# Patient Record
Sex: FEMALE | Race: WHITE | NOT HISPANIC OR LATINO | Employment: OTHER | URBAN - NONMETROPOLITAN AREA
[De-identification: names, ages, dates, MRNs, and addresses within clinical notes are randomized per-mention and may not be internally consistent; named-entity substitution may affect disease eponyms.]

---

## 2020-06-04 ENCOUNTER — PRENATAL OFFICE VISIT (OUTPATIENT)
Dept: FAMILY MEDICINE | Facility: OTHER | Age: 40
End: 2020-06-04
Payer: COMMERCIAL

## 2020-06-04 DIAGNOSIS — N91.2 ABSENCE OF MENSTRUATION: ICD-10-CM

## 2020-06-04 DIAGNOSIS — O09.519 SUPERVISION OF HIGH-RISK PREGNANCY OF ELDERLY PRIMIGRAVIDA: Primary | ICD-10-CM

## 2020-06-04 PROCEDURE — 99207 ZZC NO CHARGE NURSE ONLY: CPT

## 2020-06-04 RX ORDER — LEVOTHYROXINE SODIUM 100 UG/1
100 TABLET ORAL DAILY
COMMUNITY
End: 2020-07-31

## 2020-06-04 RX ORDER — PRENATAL VIT/IRON FUM/FOLIC AC 27MG-0.8MG
1 TABLET ORAL DAILY
COMMUNITY
End: 2022-11-20

## 2020-06-04 NOTE — PROGRESS NOTES
OB Intake phone visit with verbal patient permission.  Informed no visitors allowed during clinic visits and she expressed understanding.

## 2020-06-18 ENCOUNTER — HOSPITAL ENCOUNTER (OUTPATIENT)
Dept: ULTRASOUND IMAGING | Facility: CLINIC | Age: 40
Discharge: HOME | End: 2020-06-18
Attending: FAMILY MEDICINE | Admitting: FAMILY MEDICINE
Payer: COMMERCIAL

## 2020-06-18 ENCOUNTER — PRENATAL OFFICE VISIT (OUTPATIENT)
Dept: FAMILY MEDICINE | Facility: CLINIC | Age: 40
End: 2020-06-18
Payer: COMMERCIAL

## 2020-06-18 VITALS
HEIGHT: 66 IN | OXYGEN SATURATION: 100 % | BODY MASS INDEX: 30.53 KG/M2 | SYSTOLIC BLOOD PRESSURE: 120 MMHG | HEART RATE: 75 BPM | TEMPERATURE: 97.8 F | RESPIRATION RATE: 16 BRPM | WEIGHT: 190 LBS | DIASTOLIC BLOOD PRESSURE: 70 MMHG

## 2020-06-18 DIAGNOSIS — O09.529 SUPERVISION OF HIGH-RISK PREGNANCY OF ELDERLY MULTIGRAVIDA: Primary | ICD-10-CM

## 2020-06-18 DIAGNOSIS — N91.2 ABSENCE OF MENSTRUATION: ICD-10-CM

## 2020-06-18 DIAGNOSIS — O09.519 SUPERVISION OF HIGH-RISK PREGNANCY OF ELDERLY PRIMIGRAVIDA: ICD-10-CM

## 2020-06-18 DIAGNOSIS — B96.89 BACTERIAL VAGINOSIS IN PREGNANCY: ICD-10-CM

## 2020-06-18 DIAGNOSIS — O23.599 BACTERIAL VAGINOSIS IN PREGNANCY: ICD-10-CM

## 2020-06-18 LAB
ABO + RH BLD: NORMAL
ABO + RH BLD: NORMAL
B-HCG SERPL-ACNC: ABNORMAL IU/L (ref 0–5)
BLD GP AB SCN SERPL QL: NORMAL
BLOOD BANK CMNT PATIENT-IMP: NORMAL
ERYTHROCYTE [DISTWIDTH] IN BLOOD BY AUTOMATED COUNT: 11.5 % (ref 10–15)
HCG UR QL: POSITIVE
HCT VFR BLD AUTO: 40.3 % (ref 35–47)
HGB BLD-MCNC: 13.4 G/DL (ref 11.7–15.7)
MCH RBC QN AUTO: 31.9 PG (ref 26.5–33)
MCHC RBC AUTO-ENTMCNC: 33.3 G/DL (ref 31.5–36.5)
MCV RBC AUTO: 96 FL (ref 78–100)
PLATELET # BLD AUTO: 310 10E9/L (ref 150–450)
RBC # BLD AUTO: 4.2 10E12/L (ref 3.8–5.2)
SPECIMEN EXP DATE BLD: NORMAL
SPECIMEN SOURCE: ABNORMAL
TSH SERPL DL<=0.005 MIU/L-ACNC: 0.55 MU/L (ref 0.4–4)
WBC # BLD AUTO: 6 10E9/L (ref 4–11)
WET PREP SPEC: ABNORMAL

## 2020-06-18 PROCEDURE — 87086 URINE CULTURE/COLONY COUNT: CPT | Performed by: FAMILY MEDICINE

## 2020-06-18 PROCEDURE — 84443 ASSAY THYROID STIM HORMONE: CPT | Performed by: FAMILY MEDICINE

## 2020-06-18 PROCEDURE — 87389 HIV-1 AG W/HIV-1&-2 AB AG IA: CPT | Performed by: FAMILY MEDICINE

## 2020-06-18 PROCEDURE — 87210 SMEAR WET MOUNT SALINE/INK: CPT | Performed by: FAMILY MEDICINE

## 2020-06-18 PROCEDURE — 99207 ZZC FIRST OB VISIT: CPT | Performed by: FAMILY MEDICINE

## 2020-06-18 PROCEDURE — 76801 OB US < 14 WKS SINGLE FETUS: CPT

## 2020-06-18 PROCEDURE — 36415 COLL VENOUS BLD VENIPUNCTURE: CPT | Performed by: FAMILY MEDICINE

## 2020-06-18 PROCEDURE — 87340 HEPATITIS B SURFACE AG IA: CPT | Performed by: FAMILY MEDICINE

## 2020-06-18 PROCEDURE — 86901 BLOOD TYPING SEROLOGIC RH(D): CPT | Performed by: FAMILY MEDICINE

## 2020-06-18 PROCEDURE — 86762 RUBELLA ANTIBODY: CPT | Performed by: FAMILY MEDICINE

## 2020-06-18 PROCEDURE — 86780 TREPONEMA PALLIDUM: CPT | Performed by: FAMILY MEDICINE

## 2020-06-18 PROCEDURE — 87491 CHLMYD TRACH DNA AMP PROBE: CPT | Performed by: FAMILY MEDICINE

## 2020-06-18 PROCEDURE — 85027 COMPLETE CBC AUTOMATED: CPT | Performed by: FAMILY MEDICINE

## 2020-06-18 PROCEDURE — 86850 RBC ANTIBODY SCREEN: CPT | Performed by: FAMILY MEDICINE

## 2020-06-18 PROCEDURE — 84702 CHORIONIC GONADOTROPIN TEST: CPT | Performed by: FAMILY MEDICINE

## 2020-06-18 PROCEDURE — 86900 BLOOD TYPING SEROLOGIC ABO: CPT | Performed by: FAMILY MEDICINE

## 2020-06-18 PROCEDURE — 87591 N.GONORRHOEAE DNA AMP PROB: CPT | Performed by: FAMILY MEDICINE

## 2020-06-18 PROCEDURE — 81025 URINE PREGNANCY TEST: CPT | Performed by: FAMILY MEDICINE

## 2020-06-18 RX ORDER — METRONIDAZOLE 7.5 MG/G
1 GEL VAGINAL 2 TIMES DAILY
Qty: 50 G | Refills: 0 | Status: SHIPPED | OUTPATIENT
Start: 2020-06-18 | End: 2020-06-26

## 2020-06-18 ASSESSMENT — MIFFLIN-ST. JEOR: SCORE: 1548.33

## 2020-06-18 NOTE — PROGRESS NOTES
Estefania Arguello is a 39 year old,  female who presents alone for 1st OB visit.   Marcelo could not attend due to COVID-19 restrictions. She has talked with OB Ed prior to today's visit.  She is 6w2d by today's ultrasound, which was off by 10 days from her exact LMP and her estimated date of conception, which concerns her.  She is feeling well overall.  No bleeding or pain.     I reviewed her previous OB history as follows (none):  OB History    Para Term  AB Living   1 0 0 0 0 0   SAB TAB Ectopic Multiple Live Births   0 0 0 0 0      # Outcome Date GA Lbr Evens/2nd Weight Sex Delivery Anes PTL Lv   1 Current               Obstetric Comments   EDC 2021 based on LMP.  Parenting with Marcelo.   .      Past Medical History:   Diagnosis Date     Back pain      Hypothyroidism       There are no active problems to display for this patient.       O:  Vitals noted.  Patient alert, oriented, and in no acute distress.    Eyes: PERRLA, EOMI.  Ears:  Canals clear, TM's nl bilaterally.  No erythema or fluid.   Nares patent without inflammation or drainage.   Oral:  Oropharynx nl without erythema, exudate, mass or other lesions.   Neck:  Supple without lymphadenopathy, JVD or masses.  CV:  RRR without murmur.  Respiratory:  Lungs clear to auscultation bilaterally.  Breasts:  not examined today.   Abdomen:  Soft, nontender, nondistended with good bowel sounds and no masses or hepatosplenomegaly.  Uterus is not palpable is the abdomen.   Fetal heart tones were not attempted as noted in OB flowsheet due to early GA.   Vaginal exam reveals normal external and internal genitalia.  Cervix is closed, long and thick.  No lesions or abnormalities seen.  Bimanual exam reveals a nontender, gravid uterus consistant with 6-8 weeks with no CMT.  No adnexal masses or tenderness.   Extremities are normal without edema or lesions.    Routine labs were sent, including wet prep and gen probe.    A:  First OB visit  High  risk, due to Advanced Maternal age and also dates NOT matched with expected on early sono.   P:  Discussed routine pregnancy care, schedule of visits, nutrition, exercise, travel, answered questions.  I opted to get a quantitative HCG level today and then will repeat it early next week at the latest.  I'll want a repeat ultrasound in the next 1-2 weeks depending on her HCG levels. Discussed possibility that this represents an impending SAB, but with viable baby on sono today I am a little reassured.    Will call with lab results and she will follow up with any concerns such as pain, bleeding, cramps, etc.     Anyi Holder MD

## 2020-06-19 ENCOUNTER — TELEPHONE (OUTPATIENT)
Dept: FAMILY MEDICINE | Facility: CLINIC | Age: 40
End: 2020-06-19

## 2020-06-19 LAB
BACTERIA SPEC CULT: NO GROWTH
C TRACH DNA SPEC QL NAA+PROBE: NEGATIVE
HBV SURFACE AG SERPL QL IA: NONREACTIVE
HIV 1+2 AB+HIV1 P24 AG SERPL QL IA: NONREACTIVE
Lab: NORMAL
N GONORRHOEA DNA SPEC QL NAA+PROBE: NEGATIVE
RUBV IGG SERPL IA-ACNC: 61 IU/ML
SPECIMEN SOURCE: NORMAL
T PALLIDUM AB SER QL: NONREACTIVE

## 2020-06-19 NOTE — TELEPHONE ENCOUNTER
----- Message from Anyi Holder MD sent at 6/18/2020 11:05 PM CDT -----  Notify Estefania that her labs are all good. Her hormone level is excellent, and I'll notify her again next week after she has the repeat level. She needs to make lab appointment for Monday or Tuesday if she hasn't already.   Anyi Holder MD

## 2020-06-19 NOTE — RESULT ENCOUNTER NOTE
Notify Estefania that her labs are all good. Her hormone level is excellent, and I'll notify her again next week after she has the repeat level. She needs to make lab appointment for Monday or Tuesday if she hasn't already.   Anyi Holder MD

## 2020-06-19 NOTE — RESULT ENCOUNTER NOTE
Notify Estefania that she has a mild bacterial infection. I would like her treated with a course of metrogel vaginal for 1st trimester. See orders. This is safe in pregnancy.   Anyi Holder MD

## 2020-06-19 NOTE — TELEPHONE ENCOUNTER
----- Message from Anyi Holder MD sent at 6/18/2020 11:12 PM CDT -----  Notify Estefania that she has a mild bacterial infection. I would like her treated with a course of metrogel vaginal for 1st trimester. See orders. This is safe in pregnancy.   Anyi Holder MD

## 2020-06-20 NOTE — RESULT ENCOUNTER NOTE
Notify Estefania that her tests for infection are all normal at this time (HIV, Hepatitis, rubella, and syphilis).  Anyi Holder MD

## 2020-06-22 DIAGNOSIS — O09.519 SUPERVISION OF ELDERLY PRIMIGRAVIDA, UNSPECIFIED TRIMESTER: Primary | ICD-10-CM

## 2020-06-22 LAB — B-HCG SERPL-ACNC: ABNORMAL IU/L (ref 0–5)

## 2020-06-22 PROCEDURE — 36415 COLL VENOUS BLD VENIPUNCTURE: CPT | Performed by: FAMILY MEDICINE

## 2020-06-22 PROCEDURE — 84702 CHORIONIC GONADOTROPIN TEST: CPT | Performed by: FAMILY MEDICINE

## 2020-06-23 ENCOUNTER — TELEPHONE (OUTPATIENT)
Dept: FAMILY MEDICINE | Facility: CLINIC | Age: 40
End: 2020-06-23

## 2020-06-23 DIAGNOSIS — O09.529 SUPERVISION OF HIGH-RISK PREGNANCY OF ELDERLY MULTIGRAVIDA: Primary | ICD-10-CM

## 2020-06-23 NOTE — TELEPHONE ENCOUNTER
Per Dr. Barker her HCG level is high which is good. Patient states she understands her level rising is good but Anyi Holder MD was going to tell her if this means her dating is right and if she will need another ultrasound or not. Patient is very nervous about this and would like to here back from Anyi Holder MD personally about the results and next step.   Please advise.  Sepideh Vences CMA

## 2020-06-23 NOTE — TELEPHONE ENCOUNTER
Reason for Call:  Request for results:    Name of test or procedure: labs    Date of test of procedure: 06/22/20    Location of the test or procedure[facility] to leave the result message on voice mail or with a family member? YES    Phone number Patient can be reached at:  Home number on file [phone] (home)    Additional comments: Estefania would like someone on Dr Holder's team to call her with the results of her labs.    Call taken on 6/23/2020 at 1:37 PM by Precious Davis

## 2020-06-26 ENCOUNTER — OFFICE VISIT (OUTPATIENT)
Dept: FAMILY MEDICINE | Facility: CLINIC | Age: 40
End: 2020-06-26
Payer: COMMERCIAL

## 2020-06-26 ENCOUNTER — TELEPHONE (OUTPATIENT)
Dept: FAMILY MEDICINE | Facility: CLINIC | Age: 40
End: 2020-06-26

## 2020-06-26 ENCOUNTER — HOSPITAL ENCOUNTER (OUTPATIENT)
Dept: ULTRASOUND IMAGING | Facility: CLINIC | Age: 40
Discharge: HOME | End: 2020-06-26
Attending: FAMILY MEDICINE | Admitting: FAMILY MEDICINE
Payer: COMMERCIAL

## 2020-06-26 VITALS
DIASTOLIC BLOOD PRESSURE: 78 MMHG | TEMPERATURE: 98 F | RESPIRATION RATE: 18 BRPM | WEIGHT: 190 LBS | SYSTOLIC BLOOD PRESSURE: 116 MMHG | HEART RATE: 90 BPM | BODY MASS INDEX: 30.98 KG/M2 | OXYGEN SATURATION: 100 %

## 2020-06-26 DIAGNOSIS — O03.9 SPONTANEOUS ABORTION: Primary | ICD-10-CM

## 2020-06-26 DIAGNOSIS — O09.529 SUPERVISION OF HIGH-RISK PREGNANCY OF ELDERLY MULTIGRAVIDA: ICD-10-CM

## 2020-06-26 LAB
B-HCG SERPL-ACNC: ABNORMAL IU/L (ref 0–5)
RADIOLOGIST FLAGS: ABNORMAL

## 2020-06-26 PROCEDURE — 76801 OB US < 14 WKS SINGLE FETUS: CPT

## 2020-06-26 PROCEDURE — 84702 CHORIONIC GONADOTROPIN TEST: CPT | Performed by: FAMILY MEDICINE

## 2020-06-26 PROCEDURE — 99213 OFFICE O/P EST LOW 20 MIN: CPT | Performed by: FAMILY MEDICINE

## 2020-06-26 PROCEDURE — 36415 COLL VENOUS BLD VENIPUNCTURE: CPT | Performed by: FAMILY MEDICINE

## 2020-06-26 RX ORDER — MISOPROSTOL 200 UG/1
800 TABLET ORAL EVERY 4 HOURS
Qty: 12 TABLET | Refills: 0 | Status: SHIPPED | OUTPATIENT
Start: 2020-06-26 | End: 2020-06-27

## 2020-06-26 RX ORDER — HYDROCODONE BITARTRATE AND ACETAMINOPHEN 5; 325 MG/1; MG/1
1-2 TABLET ORAL EVERY 6 HOURS PRN
Qty: 10 TABLET | Refills: 0 | Status: SHIPPED | OUTPATIENT
Start: 2020-06-26 | End: 2020-07-02

## 2020-06-26 ASSESSMENT — PAIN SCALES - GENERAL: PAINLEVEL: NO PAIN (0)

## 2020-06-26 NOTE — PROGRESS NOTES
"Subjective     Estefania Arguello is a 39 year old female who presents to clinic today for the following health issues:    HPI   Go over US results     Patient had a ultrasound today she should have been approximately 9 weeks pregnant the gestation was approximately 7 weeks with no fetal heartbeat present.  There was some concern about this pregnancy she did have a quantitative hCG done on 2020 which was 31,000 repeat on 2028 was 42,000 so it did not double.  A ultrasound was ordered which was performed today.  I did tell the patient most likely this represents a spontaneous miscarriage open could be definitive by getting another quantitative hCG today just to make sure it was indeed declining.  I did discuss spontaneous miscarriage with her and the fact that seeing age 39 she was at slightly increased risk for this.  Also talked about options for moving forward in regards to waiting to see if she passes the pregnancy on her own, the possibility of using a oral medication namely Cytotec or scheduling a D&C in the operating room with ultrasound guidance.  I did asked that she discuss this with her  and they can come to a decision.  There is no reason to make a decision at this time and I would like to get another quantitative hCG.  I did answer questions to her satisfaction.    Patient Active Problem List   Diagnosis     Supervision of high-risk pregnancy of elderly multigravida     Past Surgical History:   Procedure Laterality Date     EYE SURGERY      \"benign tumor above right eye\"     MOUTH SURGERY      \"benign tumor under molar removed\"       Social History     Tobacco Use     Smoking status: Former Smoker     Last attempt to quit: 2018     Years since quittin.9     Smokeless tobacco: Former User   Substance Use Topics     Alcohol use: Not Currently     Family History   Problem Relation Age of Onset     Thyroid Disease Mother      Other - See Comments Mother         B12 deficiancy     " Hyperlipidemia Mother      No Known Problems Father      No Known Problems Sister      No Known Problems Brother      Skin Cancer Maternal Grandmother      Cancer Maternal Grandmother         skin     Thyroid Disease Maternal Grandmother      Emphysema Maternal Grandfather      Hypertension Paternal Grandmother      Thyroid Disease Paternal Grandmother      Emphysema Paternal Grandfather      Cancer Maternal Aunt         stomach     Cancer Maternal Uncle         stomach     ALS Paternal Uncle      Lymphoma Paternal Aunt         intravascular         Current Outpatient Medications   Medication Sig Dispense Refill     levothyroxine (SYNTHROID/LEVOTHROID) 100 MCG tablet Take 100 mcg by mouth daily       Prenatal Vit-Fe Fumarate-FA (PRENATAL MULTIVITAMIN W/IRON) 27-0.8 MG tablet Take 1 tablet by mouth daily           Reviewed and updated as needed this visit by Provider         Review of Systems   Constitutional, HEENT, cardiovascular, pulmonary, gi and gu systems are negative, except as otherwise noted.      Objective    LMP 2020   There is no height or weight on file to calculate BMI.  Physical Exam   GENERAL: healthy, alert and no distress            Assessment & Plan   Assessment      Plan  1. Spontaneous   The patient will be informed of the quantitative hCG results.  I did tell her about physical and emotional healing through this if we could be of any assistance contact us.  She will call us with the decision on how she wants to proceed.  - HCG Quantitative Pregnancy, Blood ([id number])      Chester Cha MD, MD  [facility]

## 2020-06-26 NOTE — TELEPHONE ENCOUNTER
Estefania spoke with Dr. Cha today regarding her ultrasound results and knows it is most likely a demise. She is going to have her level checked again Monday and is opting for medical management at this time. I am sending in Rx's for Cytotec as well as Vicodin for prn pain. Advised to use Ibuprofen as well, and I will speak to her Monday when results done. She is going to call for lab appt. I gave her my cell phone number to call with questions.   Anyi Holder MD

## 2020-06-29 DIAGNOSIS — O03.9 SPONTANEOUS ABORTION: ICD-10-CM

## 2020-06-29 LAB — B-HCG SERPL-ACNC: ABNORMAL IU/L (ref 0–5)

## 2020-06-29 PROCEDURE — 36415 COLL VENOUS BLD VENIPUNCTURE: CPT | Performed by: FAMILY MEDICINE

## 2020-06-29 PROCEDURE — 84702 CHORIONIC GONADOTROPIN TEST: CPT | Performed by: FAMILY MEDICINE

## 2020-06-29 NOTE — TELEPHONE ENCOUNTER
I spoke with her again today and gave her her results.  It has now dropped so she is going to go ahead and proceed with the Cytotec.  She is likely to wait until tomorrow morning.  We discussed the normal course of miscarriage.  She states that if she does need a D&C, she would like any testing on the products that could be done.  She has pain medication if needed.  I reminded her to present to the ER if she has severely heavy bleeding or lightheadedness or dizziness or bleeding that will not stop.  She is used to heavy cramps so she feels like she can tolerate that.  She has my number to call or text if any other questions arise.  Anyi Holder MD

## 2020-07-02 DIAGNOSIS — O03.9 SPONTANEOUS ABORTION: ICD-10-CM

## 2020-07-02 RX ORDER — HYDROCODONE BITARTRATE AND ACETAMINOPHEN 5; 325 MG/1; MG/1
1-2 TABLET ORAL EVERY 6 HOURS PRN
Qty: 10 TABLET | Refills: 0 | Status: SHIPPED | OUTPATIENT
Start: 2020-07-02 | End: 2020-12-23

## 2020-07-09 DIAGNOSIS — O03.9 SPONTANEOUS ABORTION: ICD-10-CM

## 2020-07-09 LAB — B-HCG SERPL-ACNC: 176 IU/L (ref 0–5)

## 2020-07-09 PROCEDURE — 36415 COLL VENOUS BLD VENIPUNCTURE: CPT | Performed by: FAMILY MEDICINE

## 2020-07-09 PROCEDURE — 84702 CHORIONIC GONADOTROPIN TEST: CPT | Performed by: FAMILY MEDICINE

## 2020-07-24 DIAGNOSIS — O03.9 SPONTANEOUS ABORTION: ICD-10-CM

## 2020-07-24 LAB — B-HCG SERPL-ACNC: 10 IU/L (ref 0–5)

## 2020-07-24 PROCEDURE — 36415 COLL VENOUS BLD VENIPUNCTURE: CPT | Performed by: FAMILY MEDICINE

## 2020-07-24 PROCEDURE — 84702 CHORIONIC GONADOTROPIN TEST: CPT | Performed by: FAMILY MEDICINE

## 2020-07-28 NOTE — RESULT ENCOUNTER NOTE
Estefania, here is your result. As you can see, it's still down further but still not zero. So please get one more test in another 1-2 weeks.  I will make sure you still have orders in your chart.   Please let me know if you have any questions or concerns.   Anyi Holder MD

## 2020-07-31 DIAGNOSIS — E03.9 HYPOTHYROIDISM, UNSPECIFIED TYPE: Primary | ICD-10-CM

## 2020-08-02 RX ORDER — LEVOTHYROXINE SODIUM 100 UG/1
100 TABLET ORAL DAILY
Qty: 90 TABLET | Refills: 1 | Status: SHIPPED | OUTPATIENT
Start: 2020-08-02 | End: 2021-02-01

## 2020-08-13 DIAGNOSIS — O03.9 SPONTANEOUS ABORTION: ICD-10-CM

## 2020-08-13 LAB — B-HCG SERPL-ACNC: 3 IU/L (ref 0–5)

## 2020-08-13 PROCEDURE — 36415 COLL VENOUS BLD VENIPUNCTURE: CPT | Performed by: FAMILY MEDICINE

## 2020-08-13 PROCEDURE — 84702 CHORIONIC GONADOTROPIN TEST: CPT | Performed by: FAMILY MEDICINE

## 2020-08-14 ENCOUNTER — TELEPHONE (OUTPATIENT)
Dept: FAMILY MEDICINE | Facility: CLINIC | Age: 40
End: 2020-08-14

## 2020-08-14 NOTE — TELEPHONE ENCOUNTER
We reviewed her last ultrasound findings, hormone levels. I advised her she may attempt to get pregnant any time she is ready now. No set planning necessary, the less stress around it the better. Now that her hormone levels are back to negligible, she doesn't need to follow up anymore.   Nothing concerning on her ultrasound, small fibroids, small subchorionic hemorrhage, corpus luteum cyst.   She is happy to hear no concerns that need follow up at this time  Will notify me with pregnancy or other concerns.   Anyi Holder MD

## 2020-08-14 NOTE — RESULT ENCOUNTER NOTE
Estefania, it's finally back to negative.  This means you don't have to recheck it anymore.  Thanks for following through on this, and I hope you feel well.   Anyi Holder MD

## 2020-08-22 ENCOUNTER — TRANSFERRED RECORDS (OUTPATIENT)
Dept: HEALTH INFORMATION MANAGEMENT | Facility: CLINIC | Age: 40
End: 2020-08-22

## 2020-09-08 ENCOUNTER — TELEPHONE (OUTPATIENT)
Dept: FAMILY MEDICINE | Facility: CLINIC | Age: 40
End: 2020-09-08

## 2020-09-08 NOTE — TELEPHONE ENCOUNTER
Estefania took a home pregnancy test today and it was positive.   She had tests Friday and Saturday that were very faint. LMP 8/6/2020. She is 4 5/7 weeks today by LMP.   A little nervous due to previous SAB. She had a negative serum HCG on 8/13/2020. I reassured her that is likely a new pregnancy, will have OB intake call her and set up intake visit, labs, early sono and 1st OB visit between 10-12 weeks if all is well.     Anyi Holder MD

## 2020-09-09 ENCOUNTER — PRENATAL OFFICE VISIT (OUTPATIENT)
Dept: FAMILY MEDICINE | Facility: CLINIC | Age: 40
End: 2020-09-09
Payer: COMMERCIAL

## 2020-09-09 DIAGNOSIS — O09.529 HIGH-RISK PREGNANCY, ELDERLY MULTIGRAVIDA, UNSPECIFIED TRIMESTER: Primary | ICD-10-CM

## 2020-09-09 RX ORDER — CETIRIZINE HYDROCHLORIDE 10 MG/1
10 TABLET ORAL DAILY
COMMUNITY
End: 2020-12-23

## 2020-09-09 RX ORDER — FLUTICASONE PROPIONATE 50 MCG
1 SPRAY, SUSPENSION (ML) NASAL DAILY
COMMUNITY
End: 2020-12-23

## 2020-09-09 NOTE — PROGRESS NOTES
OB intake phone visit with verbal patient permission.  Estefania had an early miscarriage 2 months ago.  She reports nothing has changed with her health or her or father of the baby (same as last pregnancy) family and answers to OB questionnaires are unchanged since June 2020.

## 2020-10-07 ENCOUNTER — HOSPITAL ENCOUNTER (OUTPATIENT)
Dept: ULTRASOUND IMAGING | Facility: CLINIC | Age: 40
Discharge: HOME | End: 2020-10-07
Attending: FAMILY MEDICINE | Admitting: FAMILY MEDICINE
Payer: COMMERCIAL

## 2020-10-07 DIAGNOSIS — O09.529 HIGH-RISK PREGNANCY, ELDERLY MULTIGRAVIDA, UNSPECIFIED TRIMESTER: ICD-10-CM

## 2020-10-07 DIAGNOSIS — E03.9 HYPOTHYROIDISM, UNSPECIFIED TYPE: Primary | ICD-10-CM

## 2020-10-07 LAB
B-HCG SERPL-ACNC: ABNORMAL IU/L (ref 0–5)
ERYTHROCYTE [DISTWIDTH] IN BLOOD BY AUTOMATED COUNT: 11.7 % (ref 10–15)
HCT VFR BLD AUTO: 40.3 % (ref 35–47)
HGB BLD-MCNC: 13.5 G/DL (ref 11.7–15.7)
MCH RBC QN AUTO: 32.4 PG (ref 26.5–33)
MCHC RBC AUTO-ENTMCNC: 33.5 G/DL (ref 31.5–36.5)
MCV RBC AUTO: 97 FL (ref 78–100)
PLATELET # BLD AUTO: 300 10E9/L (ref 150–450)
RBC # BLD AUTO: 4.17 10E12/L (ref 3.8–5.2)
TSH SERPL DL<=0.005 MIU/L-ACNC: 1.01 MU/L (ref 0.4–4)
WBC # BLD AUTO: 6.8 10E9/L (ref 4–11)

## 2020-10-07 PROCEDURE — 87086 URINE CULTURE/COLONY COUNT: CPT | Performed by: FAMILY MEDICINE

## 2020-10-07 PROCEDURE — 84702 CHORIONIC GONADOTROPIN TEST: CPT | Performed by: FAMILY MEDICINE

## 2020-10-07 PROCEDURE — 85027 COMPLETE CBC AUTOMATED: CPT | Performed by: FAMILY MEDICINE

## 2020-10-07 PROCEDURE — 76801 OB US < 14 WKS SINGLE FETUS: CPT

## 2020-10-07 PROCEDURE — 84443 ASSAY THYROID STIM HORMONE: CPT | Performed by: FAMILY MEDICINE

## 2020-10-08 LAB
BACTERIA SPEC CULT: NORMAL
Lab: NORMAL
SPECIMEN SOURCE: NORMAL

## 2020-10-19 ENCOUNTER — PRENATAL OFFICE VISIT (OUTPATIENT)
Dept: FAMILY MEDICINE | Facility: CLINIC | Age: 40
End: 2020-10-19
Payer: COMMERCIAL

## 2020-10-19 VITALS
BODY MASS INDEX: 31.82 KG/M2 | DIASTOLIC BLOOD PRESSURE: 66 MMHG | TEMPERATURE: 97 F | WEIGHT: 195.2 LBS | SYSTOLIC BLOOD PRESSURE: 122 MMHG | RESPIRATION RATE: 12 BRPM | OXYGEN SATURATION: 100 % | HEART RATE: 87 BPM

## 2020-10-19 DIAGNOSIS — O09.529 SUPERVISION OF HIGH-RISK PREGNANCY OF ELDERLY MULTIGRAVIDA: Primary | ICD-10-CM

## 2020-10-19 DIAGNOSIS — Z67.91 RH NEGATIVE STATE IN ANTEPARTUM PERIOD: ICD-10-CM

## 2020-10-19 DIAGNOSIS — O26.899 RH NEGATIVE STATE IN ANTEPARTUM PERIOD: ICD-10-CM

## 2020-10-19 DIAGNOSIS — E03.9 HYPOTHYROIDISM, UNSPECIFIED TYPE: ICD-10-CM

## 2020-10-19 PROCEDURE — 99207 PR FIRST OB VISIT: CPT | Performed by: FAMILY MEDICINE

## 2020-10-20 ENCOUNTER — TRANSCRIBE ORDERS (OUTPATIENT)
Dept: MATERNAL FETAL MEDICINE | Facility: CLINIC | Age: 40
End: 2020-10-20

## 2020-10-20 DIAGNOSIS — O26.90 PREGNANCY RELATED CONDITION, ANTEPARTUM: Primary | ICD-10-CM

## 2020-10-20 NOTE — PATIENT INSTRUCTIONS
Patient Education     Pregnancy: More Common Questions  On this sheet, you ll find answers to some common questions about pregnancy. If you have other questions, talk with your healthcare provider.    Will traveling be too stressful?  Not if you set the pace. When you plan a trip, allow time to stop and rest. You may even want to postpone travel until the second trimester, when your body is more adjusted to pregnancy. Don t wait as long as the third trimester, because of the possibility of going into early labor. Travel to a location where healthcare facilities are close by. You may want to take a copy of your records with you in case you need medical care when you are traveling.  Can I still be a vegetarian?  Yes. Be sure to consult a registered dietitian. And be sure to get enough of the following:    Protein. Eat eggs and milk if you re an ovo-lacto vegetarian. Eat vegetable proteins, like tofu and beans, if you re a vegan.    Calcium. If you don t eat dairy, try soy milk, soy cheese fortified with calcium, and orange juice fortified with calcium.    Vitamin B12. You may need to take a supplement that includes folic acid.    Vitamin D. If you don t drink milk, ask about taking a supplement.    Iron. Your healthcare provider may recommend a supplement.  Can I paint my nails?  Yes. Nail polish is not thought to be dangerous during pregnancy. Just be careful about breathing the fumes. Keep windows open or use a fan. However, long-term exposure to the solvents used to remove nail polish may not be safe. If you work in a nail salon, talk with your healthcare provider about safety concerns.  Should I eat more if I exercise?  You will only need an extra 100 calories for each 30 minutes of mild exercise. But you ll also need more fluids. Drink at least an extra 8 ounces of water.  Do I have to stop jogging?  You can jog as long as you are comfortable. Many women find the impact or bounce of a jog doesn t feel good.  Some switch to brisk walking as their pregnancy advances.  What should I limit or avoid eating or drinking?    Don't drink unpasteurized milk products or juices.    Don't eat raw or undercooked meat, poultry, fish, or eggs.     Don't eat prepared meats, like hot dogs or deli meat, unless served steaming hot.    Don't drink alcohol.    Limit caffeine unless your healthcare provider tells you otherwise.    Can I lift weights?  If you have been lifting weights, there is no need to stop. Instead, keep the weights light and in control. Never hold your breath. If you haven t been lifting weights, don t start now.  Date Last Reviewed: 10/1/2017    7463-1657 [employer]. [address] [place] [address], [place] [address]. All rights reserved. This information is not intended as a substitute for professional medical care. Always follow your healthcare professional's instructions.           Patient Education     Comfort Tips During Pregnancy    Talk with your healthcare provider before using pain-relieving medicine at any time during your pregnancy.  First trimester tips  Nausea    Get up slowly. Eat a few unsalted crackers before you get out of bed.    Avoid smells that bother you.    Eat small bland low fat, light high-protein meals at frequent intervals.    Sip on water, weak tea, or clear soft drinks, like ginger ale. Eat ice chips.  Fatigue    Take catnaps when you can.    Get regular exercise.    Accept help from others.    Practice good sleep habits, like going to bed and getting up at the same time each day. Use your bed only for sleep and sex.  Mood swings    Talk about your feelings with others, including other mothers.    Limit sugar, chocolate, and caffeine.    Eat a healthy diet. Don t skip meals.    Get regular exercise.  Headaches    Get fresh air and exercise.    Relax and get enough rest.    Check with your healthcare provider before taking any pain medicines.  Second trimester tips  Here are some  suggestions to help you cope:    To limit ankle swelling, sit with your feet raised or wear support hose.    If you have pain in your groin and stomach (round ligament pain), avoid sudden twisting movements.    For leg cramps, flexing your foot often brings immediate relief. You also may try massaging your calf in long, downward strokes, or stretching your legs before going to bed. Get enough exercise and wear shoes with flexible soles.  Third trimester tips  Reducing heartburn    Eat small, light meals throughout the day rather than 3 large ones.    Sleep with your upper body raised 6 inches. Don t lie down until 2 hours after you eat.    Don't eat greasy, fried, or spicy foods.    Avoid citrus fruits and juices.  Treating constipation    Eat foods high in fiber (whole-grain foods, fresh fruit and vegetables).    Drink plenty of water.    Get regular exercise.    Discuss other medicines (like docusate and psyllium) with your healthcare provider.  Taking care of your breasts    Avoid using harsh soaps or alcohol, which can cause excessive dryness.    Wear nursing bras. They provide more support than regular bras and can be used after pregnancy if you breastfeed.  Getting a good night s sleep    Take a warm shower before bed.    Sleep on a firm mattress.    Lie on your side with 1 leg crossed over the other.    Use pillows to support arms, legs, and belly.  Date Last Reviewed: 10/1/2017    9583-1288 [employer]. [address] [place] [address]. All rights reserved. This information is not intended as a substitute for professional medical care. Always follow your healthcare professional's instructions.           Patient Education     Established Pregnancy, Normal Symptoms    You are pregnant and are having symptoms that worry you. During pregnancy, it s normal to have many kinds of symptoms. Here is a list of common symptoms that happen during pregnancy.  Head and mouth    Bleeding  gums    Dizziness and fainting    Extra saliva    Headaches    Nosebleeds    Skin color changes on your face    Stuffy nose    Mild blurriness of vision, especially if you wear contact lenses  Breasts    Darkening of nipples    Yellow or white discharge from the nipples    Sore breasts and nipples    Swollen breasts  Back, arms, and legs    Back, hip, or thigh pain    Leg cramps that come and go    Numbness and tingling in your hands and fingers    Swollen hands, legs, and feet    Swollen leg veins  Belly (abdomen) and pelvis    Constipation    Feeling of pressure on your bladder and stomach    Need to urinate often    Gas and bloating    Heartburn    Anal itching, swelling, and bleeding (hemorrhoids)    Leaking urine    Mild pressure or cramping in your belly    Nausea and vomiting throughout the day or night (morning sickness)    Swollen belly    Clear to white vaginal discharge  Other symptoms    Dry, itchy skin    Forgetfulness    Less interest in sex    Mood swings    Tiredness    Trouble sleeping  Home care  Here is information that may help relieve some common pregnancy symptoms.  Sore and swollen breasts    Wear a support bra that fits properly.  Nausea and indigestion    Eat smaller meals or snacks more often.    Eat bland foods, such as bananas, crackers, or rice.    Stay away from spicy, fatty, or fried foods.    Stay away from alcohol, caffeine, and tobacco.    Don t lie down right after eating.    Raise your head with pillows when you lie down.    Eat foods or beverages that have ginger. If you drink ginger ale, be sure to make sure it has real ginger and not just ginger flavoring.  Leg swelling and varicose veins    Wear elastic support hose. Put your feet up as often as possible.  Constipation    Eat more fresh fruits and vegetables and more whole grains. Drink more clear liquids.  Joint and muscle pains    Avoid heavy lifting.    Pick things up by bending at your knees, not at your waist.    Use  acetaminophen for joint and muscle pain. Don't use aspirin, ibuprofen, or naproxen.  Mouth and nose dryness or bleeding    Drink more liquids. Use a vaporizer or humidifier in your bedroom.  Don t take medicines or use remedies that your healthcare provider hasn t approved. If you have symptoms that are severe or sudden, call your healthcare provider.  Call 911  Call 911 if any of these occur:    New chest, arm, shoulder, neck, or upper back pain    Trouble breathing    Severe belly pain or very heavy bleeding    Severe lightheadedness, passing out, or fainting    Rapid heart rate    Confusion or difficulty waking up  When to seek medical advice  Call your healthcare provider right away if any of these occur:    Burning or pain when you urinate    Depression or severe anxiety    Desire to eat or drink nonfood items such as paper, dirt, or cleaning products    Diarrhea that lasts more than 24 hours    Fast heartbeat or heart palpitations    Fever of 100.4 F (38 C) or higher, or as directed by your healthcare provider    You can t keep fluids down for 6 hours without vomiting    Severe or ongoing vomiting    Little or no urine    Major vision changes    Moderate or severe belly pain    Severe back pain    Severe constipation    Severe cramping or swelling in a leg, especially if it s just on one side    Severe headache    Sudden swelling of your face, hands, feet, or ankles    Vaginal bleeding    Very itchy skin that doesn t get better  Date Last Reviewed: 10/1/2016    4987-2254 [employer]. [address] [place] [address]. All rights reserved. This information is not intended as a substitute for professional medical care. Always follow your healthcare professional's instructions.           Patient Education     If You Are Rh Negative     A Rho(D) immune globulin injection protects against Rh disease in this and future pregnancies.     If you re Rh negative, ask your healthcare provider about  getting treated with Rho(D) immune globulin. Even if you miscarry or don t deliver the baby, you will still need treatment. The health of any baby you have in the future depends on it.  When are you treated?  If your blood has not formed Rh antibodies, you ll be treated during week 28 of your pregnancy. You also may be treated any time there s a chance that fetal blood has mixed with yours. For example, this might be after an amniocentesis, a prenatal test. Or it might be if you have vaginal bleeding earlier than 28 weeks. Treatment is an injection of a medicine called Rho(D) immune globulin. Rho(D) immune globulin stops Rh antibodies from forming. It won t harm you or the fetus. After you give birth, your baby s blood will be tested. If it s Rh positive, you ll be given Rho(D) immune globulin again within 3 days. If it s Rh negative, you won t need Rho(D) immune globulin until your next pregnancy.  Preventing future problems  Your chance of forming Rh antibodies increases with each pregnancy. This is true even for an ectopic pregnancy (the fertilized egg is outside the uterus). It is also true for pregnancies that end in miscarriage or . In these cases, you will most likely get a Rho(D) immune globulin injection. This is because your body can make Rh antibodies even if you don t deliver a baby. Rh antibodies can cause problems in future pregnancies.  If you have Rh antibodies  If antibodies have already formed (sensitization), Rho(D) immune globulin can t protect the fetus. You and the fetus will need special care during pregnancy. Your healthcare provider will explain the details to you.  Date Last Reviewed: 2016-2019 [employer]. [address] [place] [address], [place] [address]. All rights reserved. This information is not intended as a substitute for professional medical care. Always follow your healthcare professional's instructions.

## 2020-10-20 NOTE — PROGRESS NOTES
Estefania Arguello is a 40 year old,  female who presents with her  Marcelo for 1st OB visit.  She has talked with OB Ed prior to today's visit.  She is 10w4d by exact LMP and matched with her early sono.  She has some routine questions/concerns.  She is feeling well overall.      She had a recent miscarriage (early) in late . She is concerned about the recurrence risk and how to follow this pregnancy to know if it's doing well.   She has not had any bleeding other than spotting a few weeks ago. She has some mild discomforts, no severe pain, but not sure how to know if it's normal pain or not.     I reviewed her previous OB history as follows:  OB History    Para Term  AB Living   2 0 0 0 1 0   SAB TAB Ectopic Multiple Live Births   0 0 0 0 0      # Outcome Date GA Lbr Evens/2nd Weight Sex Delivery Anes PTL Lv   2 Current            1 AB 2020 7w0d             Obstetric Comments   EDC 2021  based on LMP.  Parenting with Marcelo.       Past Medical History:   Diagnosis Date     Back pain      Hypothyroidism      Seasonal allergies       Patient Active Problem List    Diagnosis Date Noted     Supervision of high-risk pregnancy of elderly multigravida 2020     Priority: Medium      O:  Vitals noted.  Patient alert, oriented, and in no acute distress.   Neck:  Supple without lymphadenopathy, JVD or masses.  CV:  RRR without murmur.  Respiratory:  Lungs clear to auscultation bilaterally.  Breasts:  not examined today.   Abdomen:  Soft, nontender, nondistended with good bowel sounds and no masses or hepatosplenomegaly.  Uterus is not palpable is the abdomen.   Fetal heart tones were not heard as noted in OB flowsheet.    Vaginal exam reveals normal external and internal genitalia.  Cervix is closed, long and thick.  No lesions or abnormalities seen.  Bimanual exam reveals a nontender, gravid uterus consistant with 10-12 weeks with no CMT.  No adnexal masses or tenderness.   Extremities  are normal without edema or lesions.      I did a quick look ultrasound in the clinic due to absent fetal heart tones and prior miscarriage, and saw a live SIUP with fetal cardiac and trunk/limb movement.     A:  First OB visit  High risk, due to AMA.     ICD-10-CM    1. Supervision of high-risk pregnancy of elderly multigravida  O09.529 Non Invasive Prenatal Test Cell Free DNA     [facility] REFERRAL-PREGNANCY   2. Rh negative state in antepartum period  O26.899 Antibody screen red cell    Z67.91    3. Hypothyroidism, unspecified type  E03.9 TSH with free T4 reflex      P:  Discussed routine pregnancy care, schedule of visits, answered questions.  Will follow up in 2 weeks to hear FHTs for reassurance or sooner with any concerns.    Will also collect labs at that time for NIPT, TSH.   Also she is A negative, and did NOT receive rhogam with her last SAB.  Will recheck antibody screen to assess for rh antibodies.   We discussed AMA, recommend referral to [facility] for level II ultrasound at 18-20 weeks. Referral placed.   Discussed genetic screening options, I don't recommend AFP as she will be getting NIPT and level II ultrasound, is already considered high risk pregnancy based on age so did not think it would add much information above what we will already be screening for.   Discussed risk of recurrent fetal loss. At this point since she has live IUP, her risk is decreased but not zero. Discussed that following HCG levels at this point is less helpful as they will plateau and not continue to double.  Discussed that I will see her in 2 weeks to listen to heart tones.  Anytime she has pain, bleeding, or other concerns that something is wrong she will notify me and we will get her in for ultrasound.  At this stage in her pregnancy that would be our best way to determine viability.  Once she can feel fetal movement she will be less concerned and the risk will be down at that stage in pregnancy.    Anyi BOYD  MD Gallo

## 2020-10-28 ENCOUNTER — TELEPHONE (OUTPATIENT)
Dept: FAMILY MEDICINE | Facility: CLINIC | Age: 40
End: 2020-10-28

## 2020-10-28 DIAGNOSIS — O20.0 THREATENED ABORTION: Primary | ICD-10-CM

## 2020-10-30 ENCOUNTER — OFFICE VISIT (OUTPATIENT)
Dept: FAMILY MEDICINE | Facility: CLINIC | Age: 40
End: 2020-10-30
Payer: COMMERCIAL

## 2020-10-30 VITALS
TEMPERATURE: 97.9 F | OXYGEN SATURATION: 100 % | RESPIRATION RATE: 12 BRPM | WEIGHT: 192 LBS | HEART RATE: 95 BPM | BODY MASS INDEX: 31.3 KG/M2 | SYSTOLIC BLOOD PRESSURE: 124 MMHG | DIASTOLIC BLOOD PRESSURE: 64 MMHG

## 2020-10-30 DIAGNOSIS — O09.529 SUPERVISION OF HIGH-RISK PREGNANCY OF ELDERLY MULTIGRAVIDA: Primary | ICD-10-CM

## 2020-10-30 DIAGNOSIS — O26.899 RH NEGATIVE STATE IN ANTEPARTUM PERIOD: ICD-10-CM

## 2020-10-30 DIAGNOSIS — E03.9 HYPOTHYROIDISM, UNSPECIFIED TYPE: ICD-10-CM

## 2020-10-30 DIAGNOSIS — Z67.91 RH NEGATIVE STATE IN ANTEPARTUM PERIOD: ICD-10-CM

## 2020-10-30 LAB
BLD GP AB SCN SERPL QL: NORMAL
TSH SERPL DL<=0.005 MIU/L-ACNC: 1.03 MU/L (ref 0.4–4)

## 2020-10-30 PROCEDURE — 99207 PR COMPLICATED OB VISIT: CPT | Performed by: FAMILY MEDICINE

## 2020-10-30 PROCEDURE — 86850 RBC ANTIBODY SCREEN: CPT | Performed by: FAMILY MEDICINE

## 2020-10-30 PROCEDURE — 84443 ASSAY THYROID STIM HORMONE: CPT | Performed by: FAMILY MEDICINE

## 2020-10-30 PROCEDURE — 36415 COLL VENOUS BLD VENIPUNCTURE: CPT | Performed by: FAMILY MEDICINE

## 2020-10-30 NOTE — RESULT ENCOUNTER NOTE
Estefania, your labs are normal (thyroid and antibody test). The genetic test has to be re-drawn (sorry when I heard about that).   Anyi Holder MD

## 2020-11-02 NOTE — PROGRESS NOTES
I had her come in because she stopped having breast tenderness and is concerned about SAB.   We couldn't hear heart tones last week and she is nervous.   I did a bedside ultrasound and see a SIUP with fetal cardiac, trunk and limb movements.   I then listened and was able to hear FHTs in abdomen with doppler.   She and her  are reassured.   She is due for labs today, opted for NIPT today as well as antibody screen due to Rh negative status and also thyroid labs. Will notify with results.   Discussed monitoring her thyroid q 3 months or more often if off.   She has appt next week, if no concerns may cancel it since it was just to hear FHT. Otherwise will continue with routine visit in 3-4 weeks.   Anyi Holder MD

## 2020-11-06 ENCOUNTER — TRANSFERRED RECORDS (OUTPATIENT)
Dept: HEALTH INFORMATION MANAGEMENT | Facility: CLINIC | Age: 40
End: 2020-11-06

## 2020-11-06 ENCOUNTER — PRENATAL OFFICE VISIT (OUTPATIENT)
Dept: FAMILY MEDICINE | Facility: CLINIC | Age: 40
End: 2020-11-06
Payer: COMMERCIAL

## 2020-11-06 VITALS
OXYGEN SATURATION: 99 % | BODY MASS INDEX: 31.4 KG/M2 | HEART RATE: 103 BPM | DIASTOLIC BLOOD PRESSURE: 80 MMHG | SYSTOLIC BLOOD PRESSURE: 112 MMHG | TEMPERATURE: 97.2 F | RESPIRATION RATE: 12 BRPM | WEIGHT: 192.6 LBS

## 2020-11-06 DIAGNOSIS — O09.529 SUPERVISION OF HIGH-RISK PREGNANCY OF ELDERLY MULTIGRAVIDA: Primary | ICD-10-CM

## 2020-11-06 DIAGNOSIS — O26.899 RH NEGATIVE STATE IN ANTEPARTUM PERIOD: ICD-10-CM

## 2020-11-06 DIAGNOSIS — O09.529 SUPERVISION OF HIGH-RISK PREGNANCY OF ELDERLY MULTIGRAVIDA: ICD-10-CM

## 2020-11-06 DIAGNOSIS — Z67.91 RH NEGATIVE STATE IN ANTEPARTUM PERIOD: ICD-10-CM

## 2020-11-06 PROCEDURE — 99000 SPECIMEN HANDLING OFFICE-LAB: CPT | Performed by: FAMILY MEDICINE

## 2020-11-06 PROCEDURE — 99207 PR COMPLICATED OB VISIT: CPT | Performed by: FAMILY MEDICINE

## 2020-11-06 PROCEDURE — 99N1100 PR STATISTIC VERIFI PRENATAL TRISOMY 21,18,13: Mod: 90 | Performed by: FAMILY MEDICINE

## 2020-11-06 PROCEDURE — 36415 COLL VENOUS BLD VENIPUNCTURE: CPT | Performed by: FAMILY MEDICINE

## 2020-11-08 NOTE — PROGRESS NOTES
Doing well. Here with  to hear heart tones and have repeat blood work. NIPT had to be re-drawn due to outdated tubes.   FHT heard at 160s. Discussed early 2nd trimester symptoms.   Will be following up again in 3 weeks for next routine visit.   Will follow up sooner prn.   Anyi Holder MD

## 2020-11-11 LAB — LAB SCANNED RESULT: NORMAL

## 2020-11-11 NOTE — RESULT ENCOUNTER NOTE
Estefania, your NIPT (genetic screening test) came back normal.   The gender was NOT identified.   Anyi Holder MD

## 2020-11-27 ENCOUNTER — PRENATAL OFFICE VISIT (OUTPATIENT)
Dept: FAMILY MEDICINE | Facility: CLINIC | Age: 40
End: 2020-11-27
Payer: COMMERCIAL

## 2020-11-27 VITALS
HEART RATE: 105 BPM | WEIGHT: 199.2 LBS | BODY MASS INDEX: 32.48 KG/M2 | TEMPERATURE: 97.9 F | DIASTOLIC BLOOD PRESSURE: 76 MMHG | SYSTOLIC BLOOD PRESSURE: 118 MMHG | RESPIRATION RATE: 14 BRPM | OXYGEN SATURATION: 98 %

## 2020-11-27 DIAGNOSIS — O09.529 SUPERVISION OF HIGH-RISK PREGNANCY OF ELDERLY MULTIGRAVIDA: Primary | ICD-10-CM

## 2020-11-27 PROCEDURE — 99207 PR COMPLICATED OB VISIT: CPT | Performed by: FAMILY MEDICINE

## 2020-11-28 NOTE — PROGRESS NOTES
Doing well overall. Has some sciatic pain on the right. Discussed some stretches she can do.   Discussed quickening.  Has MFM appt set up for 12/11/2020.  Will follow up with me in 4 weeks.  Follow up sooner with any concerns.   Anyi Holder MD

## 2020-12-09 ENCOUNTER — PRE VISIT (OUTPATIENT)
Dept: MATERNAL FETAL MEDICINE | Facility: CLINIC | Age: 40
End: 2020-12-09

## 2020-12-11 ENCOUNTER — OFFICE VISIT (OUTPATIENT)
Dept: MATERNAL FETAL MEDICINE | Facility: CLINIC | Age: 40
End: 2020-12-11
Attending: FAMILY MEDICINE
Payer: COMMERCIAL

## 2020-12-11 ENCOUNTER — TRANSFERRED RECORDS (OUTPATIENT)
Dept: HEALTH INFORMATION MANAGEMENT | Facility: CLINIC | Age: 40
End: 2020-12-11

## 2020-12-11 ENCOUNTER — HOSPITAL ENCOUNTER (OUTPATIENT)
Dept: ULTRASOUND IMAGING | Facility: CLINIC | Age: 40
End: 2020-12-11
Attending: FAMILY MEDICINE
Payer: COMMERCIAL

## 2020-12-11 DIAGNOSIS — O09.522 MULTIGRAVIDA OF ADVANCED MATERNAL AGE IN SECOND TRIMESTER: Primary | ICD-10-CM

## 2020-12-11 DIAGNOSIS — O26.90 PREGNANCY RELATED CONDITION, ANTEPARTUM: ICD-10-CM

## 2020-12-11 PROCEDURE — 76811 OB US DETAILED SNGL FETUS: CPT | Mod: 26 | Performed by: OBSTETRICS & GYNECOLOGY

## 2020-12-11 PROCEDURE — 76811 OB US DETAILED SNGL FETUS: CPT

## 2020-12-11 NOTE — PROGRESS NOTES
The patient was seen for an ultrasound in the [facility] at the [facility] today.  For a detailed report of the ultrasound examination, please see the ultrasound report which can be found under the imaging tab.    Tricia Greenwood MD  , OB/GYN  Maternal-Fetal Medicine  [phone] (Pager)

## 2020-12-13 NOTE — RESULT ENCOUNTER NOTE
Estefania, I'm glad to see everything looks good on your ultrasound. There were some areas that could not be well seen, so I understand you have a follow up ultrasound scheduled. We'll review in more detail at your next appointment.   Anyi Holder MD

## 2020-12-23 ENCOUNTER — PRENATAL OFFICE VISIT (OUTPATIENT)
Dept: FAMILY MEDICINE | Facility: CLINIC | Age: 40
End: 2020-12-23
Payer: COMMERCIAL

## 2020-12-23 VITALS
SYSTOLIC BLOOD PRESSURE: 124 MMHG | DIASTOLIC BLOOD PRESSURE: 76 MMHG | RESPIRATION RATE: 17 BRPM | BODY MASS INDEX: 32.85 KG/M2 | HEART RATE: 87 BPM | TEMPERATURE: 97.5 F | WEIGHT: 201.5 LBS | OXYGEN SATURATION: 99 %

## 2020-12-23 DIAGNOSIS — E03.9 HYPOTHYROIDISM, UNSPECIFIED TYPE: ICD-10-CM

## 2020-12-23 DIAGNOSIS — O09.529 SUPERVISION OF HIGH-RISK PREGNANCY OF ELDERLY MULTIGRAVIDA: Primary | ICD-10-CM

## 2020-12-23 DIAGNOSIS — O26.899 RH NEGATIVE STATE IN ANTEPARTUM PERIOD: ICD-10-CM

## 2020-12-23 DIAGNOSIS — Z67.91 RH NEGATIVE STATE IN ANTEPARTUM PERIOD: ICD-10-CM

## 2020-12-23 PROCEDURE — 59425 ANTEPARTUM CARE ONLY: CPT | Performed by: FAMILY MEDICINE

## 2020-12-23 ASSESSMENT — PAIN SCALES - GENERAL: PAINLEVEL: NO PAIN (0)

## 2020-12-23 NOTE — PROGRESS NOTES
Doing well.     Headaches -- occasionally gets headaches, about every 3 days.  Most, recently had headache yesterday. No RUQ pain or acute vision change. She had level II us and they recommended she start ASA. We discussed reasons why, due to primip/AMA. Will start 81 mg daily, discussed low risk of adverse effects on baby and on pregnancy.   Pain consistent with round ligament pain; has taken nothing for treatment.   Discussed PTL, PROM, and when to call or come in.  Discussed kick counts and fetal movement after 22 weeks.   Repeat MFM - BPP scheduled Jan 8th.   She would like to start birthing classes, given information on online options and she may have a place to take in-person.   Will follow up here in 4 weeks, sooner prn.   Anyi Holder MD

## 2021-01-04 ENCOUNTER — HEALTH MAINTENANCE LETTER (OUTPATIENT)
Age: 41
End: 2021-01-04

## 2021-01-08 ENCOUNTER — OFFICE VISIT (OUTPATIENT)
Dept: MATERNAL FETAL MEDICINE | Facility: CLINIC | Age: 41
End: 2021-01-08
Attending: OBSTETRICS & GYNECOLOGY
Payer: COMMERCIAL

## 2021-01-08 ENCOUNTER — HOSPITAL ENCOUNTER (OUTPATIENT)
Dept: ULTRASOUND IMAGING | Facility: CLINIC | Age: 41
End: 2021-01-08
Attending: OBSTETRICS & GYNECOLOGY
Payer: COMMERCIAL

## 2021-01-08 DIAGNOSIS — O09.522 MULTIGRAVIDA OF ADVANCED MATERNAL AGE IN SECOND TRIMESTER: ICD-10-CM

## 2021-01-08 DIAGNOSIS — O09.522 MULTIGRAVIDA OF ADVANCED MATERNAL AGE IN SECOND TRIMESTER: Primary | ICD-10-CM

## 2021-01-08 PROCEDURE — 76816 OB US FOLLOW-UP PER FETUS: CPT

## 2021-01-08 PROCEDURE — 76816 OB US FOLLOW-UP PER FETUS: CPT | Mod: 26 | Performed by: OBSTETRICS & GYNECOLOGY

## 2021-01-08 NOTE — PROGRESS NOTES
The patient was seen for an ultrasound in the M[facility]Fetal Medicine Center at the [facility] today.  For a detailed report of the ultrasound examination, please see the ultrasound report which can be found under the imaging tab.    Tricia Greenwood MD  , OB/GYN  Maternal-Fetal Medicine  [phone] (Pager)

## 2021-01-11 NOTE — RESULT ENCOUNTER NOTE
Estefania, it looks like your ultrasound was completed and normal. All the parts of the baby that were not previously well seen looked normal today.   We'll want to schedule a follow up ultrasound at 32 weeks here to check on the baby's growth.   I hope you're feeling well, see you at your next appointment.   Anyi Holder MD

## 2021-01-22 ENCOUNTER — PRENATAL OFFICE VISIT (OUTPATIENT)
Dept: FAMILY MEDICINE | Facility: CLINIC | Age: 41
End: 2021-01-22
Payer: COMMERCIAL

## 2021-01-22 VITALS
OXYGEN SATURATION: 98 % | SYSTOLIC BLOOD PRESSURE: 116 MMHG | WEIGHT: 216 LBS | BODY MASS INDEX: 35.22 KG/M2 | DIASTOLIC BLOOD PRESSURE: 72 MMHG | TEMPERATURE: 98.1 F | RESPIRATION RATE: 16 BRPM | HEART RATE: 102 BPM

## 2021-01-22 DIAGNOSIS — E03.9 HYPOTHYROIDISM, UNSPECIFIED TYPE: ICD-10-CM

## 2021-01-22 DIAGNOSIS — O24.419 GESTATIONAL DIABETES MELLITUS (GDM) IN SECOND TRIMESTER, GESTATIONAL DIABETES METHOD OF CONTROL UNSPECIFIED: ICD-10-CM

## 2021-01-22 DIAGNOSIS — O09.529 SUPERVISION OF HIGH-RISK PREGNANCY OF ELDERLY MULTIGRAVIDA: Primary | ICD-10-CM

## 2021-01-22 DIAGNOSIS — O99.810 ABNORMAL MATERNAL GLUCOSE TOLERANCE, ANTEPARTUM: ICD-10-CM

## 2021-01-22 LAB
ALBUMIN SERPL-MCNC: 2.6 G/DL (ref 3.4–5)
ALP SERPL-CCNC: 39 U/L (ref 40–150)
ALT SERPL W P-5'-P-CCNC: 38 U/L (ref 0–50)
ANION GAP SERPL CALCULATED.3IONS-SCNC: 5 MMOL/L (ref 3–14)
AST SERPL W P-5'-P-CCNC: 14 U/L (ref 0–45)
BILIRUB SERPL-MCNC: 0.2 MG/DL (ref 0.2–1.3)
BUN SERPL-MCNC: 10 MG/DL (ref 7–30)
CALCIUM SERPL-MCNC: 8.3 MG/DL (ref 8.5–10.1)
CHLORIDE SERPL-SCNC: 105 MMOL/L (ref 94–109)
CO2 SERPL-SCNC: 26 MMOL/L (ref 20–32)
CREAT SERPL-MCNC: 0.54 MG/DL (ref 0.52–1.04)
CREAT UR-MCNC: 65 MG/DL
ERYTHROCYTE [DISTWIDTH] IN BLOOD BY AUTOMATED COUNT: 12.7 % (ref 10–15)
GFR SERPL CREATININE-BSD FRML MDRD: >90 ML/MIN/{1.73_M2}
GLUCOSE 1H P 50 G GLC PO SERPL-MCNC: 154 MG/DL (ref 60–129)
GLUCOSE SERPL-MCNC: 157 MG/DL (ref 70–99)
HCT VFR BLD AUTO: 36 % (ref 35–47)
HGB BLD-MCNC: 12.2 G/DL (ref 11.7–15.7)
MCH RBC QN AUTO: 32.8 PG (ref 26.5–33)
MCHC RBC AUTO-ENTMCNC: 33.9 G/DL (ref 31.5–36.5)
MCV RBC AUTO: 97 FL (ref 78–100)
MICROALBUMIN UR-MCNC: <5 MG/L
MICROALBUMIN/CREAT UR: NORMAL MG/G CR (ref 0–25)
PLATELET # BLD AUTO: 278 10E9/L (ref 150–450)
POTASSIUM SERPL-SCNC: 3.6 MMOL/L (ref 3.4–5.3)
PROT SERPL-MCNC: 6.5 G/DL (ref 6.8–8.8)
RBC # BLD AUTO: 3.72 10E12/L (ref 3.8–5.2)
SODIUM SERPL-SCNC: 136 MMOL/L (ref 133–144)
T PALLIDUM AB SER QL: NONREACTIVE
TSH SERPL DL<=0.005 MIU/L-ACNC: 0.87 MU/L (ref 0.4–4)
URATE SERPL-MCNC: 3.3 MG/DL (ref 2.6–6)
WBC # BLD AUTO: 6.7 10E9/L (ref 4–11)

## 2021-01-22 PROCEDURE — 82043 UR ALBUMIN QUANTITATIVE: CPT | Performed by: FAMILY MEDICINE

## 2021-01-22 PROCEDURE — 99000 SPECIMEN HANDLING OFFICE-LAB: CPT | Performed by: FAMILY MEDICINE

## 2021-01-22 PROCEDURE — 86780 TREPONEMA PALLIDUM: CPT | Mod: 90 | Performed by: FAMILY MEDICINE

## 2021-01-22 PROCEDURE — 36415 COLL VENOUS BLD VENIPUNCTURE: CPT | Performed by: FAMILY MEDICINE

## 2021-01-22 PROCEDURE — 99207 PR PRENATAL VISIT: CPT | Performed by: FAMILY MEDICINE

## 2021-01-22 PROCEDURE — 80053 COMPREHEN METABOLIC PANEL: CPT | Performed by: FAMILY MEDICINE

## 2021-01-22 PROCEDURE — 84550 ASSAY OF BLOOD/URIC ACID: CPT | Performed by: FAMILY MEDICINE

## 2021-01-22 PROCEDURE — 82950 GLUCOSE TEST: CPT | Performed by: FAMILY MEDICINE

## 2021-01-22 PROCEDURE — 84443 ASSAY THYROID STIM HORMONE: CPT | Performed by: FAMILY MEDICINE

## 2021-01-22 PROCEDURE — 85027 COMPLETE CBC AUTOMATED: CPT | Performed by: FAMILY MEDICINE

## 2021-01-22 NOTE — PROGRESS NOTES
Doing well.   Complains of being more sore, like in back and hips. Occasionally gets some pain up under right ribs.  Discussed stretching and staying active.  She has been limited by the weather.  Encouraged her to find some indoor exercise that she can tolerate through the winter.  Discussed biliary/liver pain. She has no n/v, not related to food. She does get full especially at night, eats a big dinner late at night. We discussed her weight gain which is excessive for her goal, and I recommend she have a very late evening meal before bed.  Try to eat her bigger meal earlier in the day or cut back altogether.  Encouraged her to cut out on starches and carbs and focus mostly on Mediterranean-style foods such as vegetables and lean meats.  Getting baseline preeclampsia labs today as well as a thyroid and her Glucola.  We will do her ABO Rh next time with her RhoGam.  Discussed fetal kick counts.   Will follow up in 4 weeks, sooner prn.   Will plan BPP with growth measurements at 32 weeks, order placed.   Anyi Holder MD

## 2021-01-25 NOTE — RESULT ENCOUNTER NOTE
Estefania, here are the results we discussed. These are all normal for pregnancy except the diabetes test. I have the orders in for the 3 hour Glucose challenge test, so please make your lab appointment for Friday.   Anyi Holder MD

## 2021-01-29 DIAGNOSIS — O99.810 ABNORMAL MATERNAL GLUCOSE TOLERANCE, ANTEPARTUM: ICD-10-CM

## 2021-01-29 LAB
GLUCOSE 1H P 100 G GLC PO SERPL-MCNC: 181 MG/DL (ref 60–179)
GLUCOSE 2H P 100 G GLC PO SERPL-MCNC: 145 MG/DL (ref 60–154)
GLUCOSE 3H P 100 G GLC PO SERPL-MCNC: 77 MG/DL (ref 60–139)
GLUCOSE P FAST SERPL-MCNC: 96 MG/DL (ref 60–94)

## 2021-01-29 PROCEDURE — 82952 GTT-ADDED SAMPLES: CPT | Performed by: FAMILY MEDICINE

## 2021-01-29 PROCEDURE — 82951 GLUCOSE TOLERANCE TEST (GTT): CPT | Performed by: FAMILY MEDICINE

## 2021-01-29 PROCEDURE — 36415 COLL VENOUS BLD VENIPUNCTURE: CPT | Performed by: FAMILY MEDICINE

## 2021-02-01 ENCOUNTER — PREP FOR PROCEDURE (OUTPATIENT)
Dept: FAMILY MEDICINE | Facility: CLINIC | Age: 41
End: 2021-02-01
Payer: COMMERCIAL

## 2021-02-01 DIAGNOSIS — O24.419 GESTATIONAL DIABETES MELLITUS (GDM) IN SECOND TRIMESTER, GESTATIONAL DIABETES METHOD OF CONTROL UNSPECIFIED: Primary | ICD-10-CM

## 2021-02-01 DIAGNOSIS — E03.9 HYPOTHYROIDISM, UNSPECIFIED TYPE: ICD-10-CM

## 2021-02-01 PROCEDURE — 99207 PR NO CHARGE LOS: CPT | Performed by: FAMILY MEDICINE

## 2021-02-01 RX ORDER — LEVOTHYROXINE SODIUM 100 UG/1
100 TABLET ORAL DAILY
Qty: 90 TABLET | Refills: 1 | Status: SHIPPED | OUTPATIENT
Start: 2021-02-01 | End: 2021-07-09

## 2021-02-01 RX ORDER — LANCETS
EACH MISCELLANEOUS
Qty: 100 EACH | Refills: 6 | Status: SHIPPED | OUTPATIENT
Start: 2021-02-01 | End: 2022-11-20

## 2021-02-01 RX ORDER — GLUCOSAMINE HCL/CHONDROITIN SU 500-400 MG
CAPSULE ORAL
Qty: 100 EACH | Refills: 3 | Status: SHIPPED | OUTPATIENT
Start: 2021-02-01 | End: 2022-11-20

## 2021-02-01 NOTE — PROGRESS NOTES
Patient called today and stated that she can't get in to DM Ed for about 2 weeks, is concerned about waiting that long. I advised her to start checking her glucose qid, am ac and 1 hour pp JUSTICE, KATHY, DN. We briefly discussed carb controlled diet, avoiding simple sugars, eating protein with each meal, and eating small frequent meals through the day. Will then bring her data to her DM appt.     I also renewed her thyroid medication.   Anyi Holder MD

## 2021-02-02 ENCOUNTER — TELEPHONE (OUTPATIENT)
Dept: FAMILY MEDICINE | Facility: CLINIC | Age: 41
End: 2021-02-02

## 2021-02-02 NOTE — TELEPHONE ENCOUNTER
Prior Authorization Retail Medication Request  PHARMACY COMMENTS:  Script clarification needs prior auth. They don't want to pay for more than 50 at a time.      Medication/Dose: blood glucose (NO BRAND SPECIFIED) test strip    ICD code (if different than what is on RX):    Previously Tried and Failed:    Rationale: Pt uses to test blood sugar 4-6 times daily or a directed. To accompany Blood Glucose Monitor Brands: per insurance.     Insurance Name: [employer]   Insurance ID:  [id number]      Pharmacy Information (if different than what is on RX)  Name:    Phone:

## 2021-02-03 NOTE — TELEPHONE ENCOUNTER
Prior Authorization Approval    Authorization Effective Date: 2/3/2021  Authorization Expiration Date: 2/3/2022  Medication: blood glucose (NO BRAND SPECIFIED) test strip--APPROVED  Approved Dose/Quantity:   Reference #:     Insurance Company: [employer] ([facility]) - Phone [phone] Fax [phone]  Expected CoPay:       CoPay Card Available:      Foundation Assistance Needed:    Which Pharmacy is filling the prescription (Not needed for infusion/clinic administered): [id number] [place] [address]  Pharmacy Notified: Yes  Patient Notified: Yes **Instructed pharmacy to notify patient when script is ready to /ship.**

## 2021-02-03 NOTE — TELEPHONE ENCOUNTER
PA Initiation    Medication: blood glucose (NO BRAND SPECIFIED) test strip  Insurance Company: [email or web address] ([facility]) - Phone [phone] Fax [phone]  Pharmacy Filling the Rx[id number] [place] [facility] [place], [place] - [address] [place] [address]  Filling Pharmacy Phone: [phone]  Filling Pharmacy Fax: [phone]  Start Date: 2/3/2021

## 2021-02-11 ENCOUNTER — PATIENT OUTREACH (OUTPATIENT)
Dept: EDUCATION SERVICES | Facility: CLINIC | Age: 41
End: 2021-02-11
Payer: COMMERCIAL

## 2021-02-11 ENCOUNTER — TELEPHONE (OUTPATIENT)
Dept: FAMILY MEDICINE | Facility: CLINIC | Age: 41
End: 2021-02-11

## 2021-02-11 DIAGNOSIS — O24.419 GESTATIONAL DIABETES: Primary | ICD-10-CM

## 2021-02-11 PROCEDURE — G0108 DIAB MANAGE TRN  PER INDIV: HCPCS | Mod: TEL

## 2021-02-11 NOTE — TELEPHONE ENCOUNTER
Prior auth needed to check blood sugar 4-6 times daily.  BIN [id number]  ID [id number]    ................Pradeep Thornton LPN,   February 11, 2021,      10:46 AM,   [facility]

## 2021-02-11 NOTE — PATIENT INSTRUCTIONS
Follow up with diabetes education for blood glucose and ketone review on 2/18    Plan to share your glucose and ketone information with diabetes education once a week, unless otherwise directed.     1. Check glucose 4 times daily, before breakfast daily and 1 hour after each meal, as recommended.    2. Check ketones daily or once a week after they have been negative for 7 days in a row. If ketones are elevated, let your diabetes educator know and continue to check daily until they are negative for 7 days in a row.    3. Continue with recommended physical activity.    4. Continue to follow recommended meal plan: 2-3 carbs at breakfast, 3-4 carbs at lunch, 3-4 carbs at supper, 1-2 carbs at snacks.  Follow consistent CHO meal plan, eat CHO and protein/fat at all meals/snacks.    5. Follow-up with OB doctor as recommended.    6. Call or MyChart message your diabetes educator if 3 or more blood sugars are above the goal in 1 week or if ketones are elevated (trace or above).       AFTER YOU DELIVER:  - Continue with healthy eating and physical activity to get back to your pre-pregnancy weight.   - Have a follow-up 2-hour Glucose Tolerance Test at your 6-week post-partum check-up.   - Have your fasting blood sugar checked once a year.  - Plan ahead for future pregnancies - eat healthy, keep active, work with your doctor to check for gestational diabetes early on in the pregnancy and check blood sugars as recommended by your doctor.

## 2021-02-11 NOTE — PROGRESS NOTES
Diabetes Self-Management Education & Support  Type of Service: Telephone Visit    How would patient like to obtain AVS? Janette      SUBJECTIVE/OBJECTIVE:  Presents for education related to gestational diabetes.    Accompanied by: Self  Gestational weeks: 27  Hospital planned for delivery: [place]  Next OB Visit Date: 02/19/21  Had any babies over 9 lbs: No  Have you ever had thyroid problems or taken thyroid medication?: (!) Yes  Heart disease, mitral valve prolapse or rheumatic fever?: No  Hypertension : No  High Cholesterol: No  Do you use tobacco products?: No  Do you drink beer, wine or hard liquor?: No    Cultural Influences/Ethnic Background:  American      Estimated Date of Delivery: May 13, 2021    1 hour OGTT  Lab Results   Component Value Date    GLU1 154 (H) 01/22/2021       3 hour OGTT    Fasting  Lab Results   Component Value Date    GLF 96 (H) 01/29/2021       1 hour  Lab Results   Component Value Date    GL1 181 (H) 01/29/2021       2 hour  Lab Results   Component Value Date    GL2 145 01/29/2021       3 hour  Lab Results   Component Value Date    GL3 77 01/29/2021       Lifestyle and Health Behaviors:  Exercise:: Yes  Days per week of moderate to strenuous exercise (like a brisk walk): 3  On average, minutes per day of exercise at this level: 30  How intense was your typical exercise? : Light (like stretching or slow walking)  Exercise Minutes per Week: 90  Barrier to exercise: None  Cultural/Evangelical diet restrictions?: No  Meal planning/habits: None  Meals include: Breakfast, Lunch, Dinner  Breakfast: HB egg OR smoothie (1+ cup berries, spinach, 1/4 banana, 1 cup milk, Greek yogurt) OR english muffin, black bean burger, cheese, pesto  Lunch: leftovers - pasta, ground turkey, asparagus, apple OR turkey sandwich, wheat thins OR beef barley stew  Dinner: chicken wrap (low carb tortilla) OR turkey brat, sweet potato, WW bread OR chicken thigh, 1/2 potato, greek yogurt, bread OR stew  Snacks:  grapes, apple, cottage cheese, (2-3am) milk + kind bar  Biggest challenges to healthy eating: None  Pre-zhang vitamin?: Yes  Supplements?: No  Experiencing heartburn?: Yes    Healthy Coping:  Emotional response to diabetes: Ready to learn, Concern for health and well-being  Stage of change: ACTION (Actively working towards change)    Current Management:  Taking medications for gestational diabetes?: No    ASSESSMENT:  Pt with a new dx of GDM and needs comprehensive education.  Today we discussed pathophysiology, BG targets, and GDM meal plan.  Did not have time to discuss ketone testing.    Blood Glucose/Ketone Log:    Date Fasting Post Breakfast Post Lunch Post Supper   2/3 80 91 108 126   2/ 92 105 114 113   2/ 109 113 97 145/108   2 100 121 128 118   2/ 105 104 89 105    84 113 129 146    104 109 134 96   2/10 94 120 124 131    111 114       Patient's typical routine is to eat dinner at 9-10pm.  She is usually in bed before 12am.  Wakes up at 2-3am and eat a snack (kind bar, milk).  Occasionally wakes up again at 4am and ea-ts another snack.  Given late dinner, patient may not have time for a snack before bed.  I recommend she keep her 2am snack to 30gm carb + protein.  She should not snack again during the early morning hours.  We will reassess next week to see if this helps bring the fasting BG within target.    INTERVENTION:  Continue testing 4x/day.  Write down food intake.  Modify bedtime snack practice.  Can send BG log via Verisim next Wednesday prior to follow-up appointment.    Educational topics covered today:  GDM diagnosis, pathophysiology, Risks and Complications of GDM, Means of controlling GDM, Using a Blood Glucose Monitor, Blood Glucose Goals, Logging and Interpreting Glucose Results, When to Call a Diabetes Educator or OB Provider, Healthy Eating During Pregnancy, Counting Carbohydrates, Meal Planning for GDM, and Physical Activity    Educational materials provided today:    Barbara Understanding Gestational Diabetes  GDM Log Book  Sharps Disposal  Care After Delivery      Pt verbalized understanding of concepts discussed and recommendations provided today.     PLAN:  Check glucose 4 times daily, before breakfast and 1 hour after each meal.     Check Ketones daily for one week, if negative, reduce testing to once a week.     Physical activity recommended: as able.    Meal plan: 2-3 carbs at breakfast, 3-4 carbs at lunch, 3-4 carbs at supper, 1-2 carbs at 3 snacks a day.  Follow consistent CHO meal plan, eat CHO and protein/fat at all meals/snacks.    Call/e-mail/Dataupiahart message diabetes educator if 3 or more blood sugars are above the goal in 1 week, if ketones are positive, or with questions/concerns.    ALEJANDRA Grant CDE    Time Spent: 70 minutes  Encounter Type: Individual    Any diabetes medication dose changes were made via the CDE Protocol and Collaborative Practice Agreement with the patient's OB/GYN provider. A copy of this encounter was shared with the provider.

## 2021-02-18 ENCOUNTER — PATIENT OUTREACH (OUTPATIENT)
Dept: EDUCATION SERVICES | Facility: CLINIC | Age: 41
End: 2021-02-18
Payer: COMMERCIAL

## 2021-02-18 ENCOUNTER — TELEPHONE (OUTPATIENT)
Dept: EDUCATION SERVICES | Facility: CLINIC | Age: 41
End: 2021-02-18

## 2021-02-18 ENCOUNTER — HOSPITAL ENCOUNTER (OUTPATIENT)
Facility: CLINIC | Age: 41
Discharge: HOME | End: 2021-02-18
Attending: FAMILY MEDICINE | Admitting: FAMILY MEDICINE
Payer: COMMERCIAL

## 2021-02-18 VITALS — DIASTOLIC BLOOD PRESSURE: 84 MMHG | SYSTOLIC BLOOD PRESSURE: 125 MMHG | RESPIRATION RATE: 16 BRPM | TEMPERATURE: 98.3 F

## 2021-02-18 DIAGNOSIS — O24.419 GESTATIONAL DIABETES: Primary | ICD-10-CM

## 2021-02-18 DIAGNOSIS — O24.419 GDM, CLASS A2: Primary | ICD-10-CM

## 2021-02-18 PROCEDURE — G0463 HOSPITAL OUTPT CLINIC VISIT: HCPCS

## 2021-02-18 PROCEDURE — G0108 DIAB MANAGE TRN  PER INDIV: HCPCS | Mod: TEL

## 2021-02-18 NOTE — PROGRESS NOTES
S: Triage Arrival  B: Estefania is a 40 y.o.  @ 28w 0d who presents with complaint of fell at home today, 1130, landed on butt  A: EFM applied. FHT's135 with moderate variability,  no decels noted on strip. Contractions none  R: Will notify MD to obtain further orders.

## 2021-02-18 NOTE — PROGRESS NOTES
"Diabetes Self-Management Education & Support  Type of Service: Telephone Visit    How would patient like to obtain AVS? Janette    SUBJECTIVE/OBJECTIVE:  Presents for education related to gestational diabetes.    Accompanied by: Self  Diabetes management related comments/concerns: BGs above target  Gestational weeks: 28  Next OB Visit Date: 21    Cultural Influences/Ethnic Background:  American      LMP 2020 (Exact Date)       Estimated Date of Delivery: May 13, 2021    Blood Glucose/Ketone Log:   Date Fasting Post Breakfast Post Lunch Post Supper    112 90 145/116 166    115 124 161 132    98 113 116 113   2/15 101 106* 186 129    111 113 159 91    100 108 98 128              Lifestyle and Health Behaviors:  Exercise:: Yes  Days per week of moderate to strenuous exercise (like a brisk walk): 3  On average, minutes per day of exercise at this level: 30  How intense was your typical exercise? : Light (like stretching or slow walking)  Exercise Minutes per Week: 90  Barrier to exercise: None, Physical limitation(sciatica, back pain)  Cultural/Episcopal diet restrictions?: No  Meal planning/habits: None  Meals include: Breakfast, Lunch, Dinner  Breakfast: HB egg OR smoothie (1+ cup berries, spinach, 1/4 banana, 1 cup milk, Greek yogurt) OR english muffin, black bean burger, cheese, pesto  Lunch: 6\" sub sandwich OR sandwich on WW, grapes OR 2 low-carb tortillas, wheat thins  Dinner: tacos - beef, beans, cheese, sour cream OR chicken breast, wild rice, brussel sprouts  Snacks: 2/3am - kind bar + milk  Pre- vitamin?: Yes  Experiencing nausea?: No    Healthy Coping:  Emotional response to diabetes: Ready to learn, Concern for health and well-being  Stage of change: ACTION (Actively working towards change)    Current Management:  Taking medications for gestational diabetes?: No    ASSESSMENT:    Fasting blood glucoses: 0% in target.  After breakfast: 100% in target.  After lunch: 42% " in target.  After dinner: 85% in target.    Recommend initiate 15 units (0.15u/kg) NPH insulin at bedtime to target elevated fasting BGs.  Patient will try to modify lunch to 30 gm carb and include some exercise at that time.  Will need to closely watch post-lunch numbers and reassess in one week.    INTERVENTION:  Educational topics covered today:  What to expect after delivery, Future testing for Type 2 diabetes (2 hour OGTT at 6 week post-partum check-up and annual fasting blood glucose level), Risk of GDM and planning ahead for future pregnancies, Recommended lifestyle interventions for reducing the risk of Type 2 Diabetes, When to Call a Diabetes Educator or OB Provider    Educational Materials provided today:  Barbara Preventing Diabetes    PLAN:  Recommend NPH insulin at bedtime.  Will contact MD for orders.  Check glucose 4 times daily.  Check ketones once a week when readings are consistently negative.  Continue with recommended physical activity.  Continue to follow recommended meal plan: 2-3 carbs at breakfast, 2-3 carbs at lunch, 3-4 carbs at supper, 1-2 carbs at snacks.  Follow consistent CHO meal plan, eat CHO and protein/fat at all meals/snacks.    Call/e-mail/MyChart message diabetes educator if 3 or more blood sugars are above the goal in 1 week or if ketones are positive.    ALEJANDRA Grant CDE    Time Spent: 60 minutes  Encounter Type: Individual    Any diabetes medication dose changes were made via the CDE Protocol and Collaborative Practice Agreement with the patient's OB/GYN provider. A copy of this encounter was shared with the provider.

## 2021-02-18 NOTE — PATIENT INSTRUCTIONS
Follow up with diabetes education for blood glucose and ketone review on 2/25    Plan to share your glucose and ketone information with diabetes education once a week, unless otherwise directed.     1. Check glucose 4 times daily, before breakfast daily and 1 hour after each meal, as recommended.    2. Check ketones daily or once a week after they have been negative for 7 days in a row. If ketones are elevated, let your diabetes educator know and continue to check daily until they are negative for 7 days in a row.    3. Continue with recommended physical activity.    4. Continue to follow recommended meal plan: 2-3 carbs at breakfast, 2-3 carbs at lunch, 3-4 carbs at supper, 1-2 carbs at snacks.  Follow consistent CHO meal plan, eat CHO and protein/fat at all meals/snacks.    5. Follow-up with OB doctor as recommended.    6. Call or MyChart message your diabetes educator if 3 or more blood sugars are above the goal in 1 week or if ketones are elevated (trace or above).       AFTER YOU DELIVER:  - Continue with healthy eating and physical activity to get back to your pre-pregnancy weight.   - Have a follow-up 2-hour Glucose Tolerance Test at your 6-week post-partum check-up.   - Have your fasting blood sugar checked once a year.  - Plan ahead for future pregnancies - eat healthy, keep active, work with your doctor to check for gestational diabetes early on in the pregnancy and check blood sugars as recommended by your doctor.     Taking Insulin:    1. Take NPH (Humulin-N or Novolin-N) - TBD units at bedtime.     - Rotate injection sites, keeping at least 1 inch apart from last injection site and 2 inches away from belly button or surgical scars.    -If you have a cloudy insulin, mix the insulin gently by rolling between your hands 10 times and turning upside down and right side up 10 times. Do not shake.     2. Pen - Use a new pen needle for each injection. Always remove pen needle from the insulin pen after use and  "do not store insulin pens with the needle on the pen. Do a 2 unit \"prime\" before each injection, be sure a drop or stream of insulin comes out of the needle before you give your injection. After you inject, hold the needle under the skin to the count of 10 to be sure all of the insulin goes in.      3. Store insulin you are not using in the refrigerator (do not freeze). Take new insulin out of the refrigerator a few hours prior to use to bring to room temperature.     4. Once opened NPH Humulin N Kwikpens can be kept at room temperature for 14 days.. Do not use the opened insulin after this time has passed, even if there is still medicine inside.     5. Always carry your blood sugar meter and a sugar source like glucose tablets with you in case of a low blood sugar. Treat a low blood sugar (less than 70) with 15 grams of carbohydrate (1 carb choice). Wait 15 minutes, recheck blood sugar. If blood sugar is still below 70, repeat the treatment.    6. Wear Medical ID or carry a wallet card stating you have Diabetes.    7. Call your doctor or diabetes educator if you begin having low blood sugars or if you have questions or concerns.     8. Complete and mail in the form to inform the [place] [employer] that you have started taking insulin.    9. Follow-up: Follow-up diabetes education appointment scheduled on 2/25. .    [facility] for the [facility]:  For Your Diabetes Education or Nutrition Appointments Call:  [phone]   For Diabetes or Nutrition Related Questions:   Phone: [phone]  Send [employer] Message   If you need a medication refill please contact your pharmacy. Please allow 3 business days for your refills to be completed.   "

## 2021-02-19 ENCOUNTER — PRENATAL OFFICE VISIT (OUTPATIENT)
Dept: FAMILY MEDICINE | Facility: CLINIC | Age: 41
End: 2021-02-19
Payer: COMMERCIAL

## 2021-02-19 VITALS
BODY MASS INDEX: 35.67 KG/M2 | DIASTOLIC BLOOD PRESSURE: 66 MMHG | OXYGEN SATURATION: 98 % | HEART RATE: 98 BPM | SYSTOLIC BLOOD PRESSURE: 118 MMHG | WEIGHT: 218.8 LBS | RESPIRATION RATE: 14 BRPM | TEMPERATURE: 97.1 F

## 2021-02-19 DIAGNOSIS — O26.899 RH NEGATIVE STATE IN ANTEPARTUM PERIOD: ICD-10-CM

## 2021-02-19 DIAGNOSIS — E03.9 HYPOTHYROIDISM, UNSPECIFIED TYPE: Primary | ICD-10-CM

## 2021-02-19 DIAGNOSIS — Z23 NEED FOR VACCINATION: ICD-10-CM

## 2021-02-19 DIAGNOSIS — O09.529 SUPERVISION OF HIGH-RISK PREGNANCY OF ELDERLY MULTIGRAVIDA: ICD-10-CM

## 2021-02-19 DIAGNOSIS — O09.529 SUPERVISION OF HIGH-RISK PREGNANCY OF ELDERLY MULTIGRAVIDA: Primary | ICD-10-CM

## 2021-02-19 DIAGNOSIS — O24.419 GDM, CLASS A2: ICD-10-CM

## 2021-02-19 DIAGNOSIS — Z67.91 RH NEGATIVE STATE IN ANTEPARTUM PERIOD: ICD-10-CM

## 2021-02-19 LAB
ABO + RH BLD: NORMAL
ABO + RH BLD: NORMAL
BLD GP AB SCN SERPL QL: NORMAL
BLOOD BANK CMNT PATIENT-IMP: NORMAL
SPECIMEN EXP DATE BLD: NORMAL

## 2021-02-19 PROCEDURE — 86901 BLOOD TYPING SEROLOGIC RH(D): CPT | Performed by: FAMILY MEDICINE

## 2021-02-19 PROCEDURE — 90715 TDAP VACCINE 7 YRS/> IM: CPT | Performed by: FAMILY MEDICINE

## 2021-02-19 PROCEDURE — 90471 IMMUNIZATION ADMIN: CPT | Performed by: FAMILY MEDICINE

## 2021-02-19 PROCEDURE — 99207 PR COMPLICATED OB VISIT: CPT | Performed by: FAMILY MEDICINE

## 2021-02-19 PROCEDURE — 96372 THER/PROPH/DIAG INJ SC/IM: CPT | Performed by: FAMILY MEDICINE

## 2021-02-19 PROCEDURE — 86900 BLOOD TYPING SEROLOGIC ABO: CPT | Performed by: FAMILY MEDICINE

## 2021-02-19 PROCEDURE — 36415 COLL VENOUS BLD VENIPUNCTURE: CPT | Performed by: FAMILY MEDICINE

## 2021-02-19 PROCEDURE — 86850 RBC ANTIBODY SCREEN: CPT | Performed by: FAMILY MEDICINE

## 2021-02-19 RX ORDER — GLUCOSAMINE HCL/CHONDROITIN SU 500-400 MG
CAPSULE ORAL
Qty: 100 EACH | Refills: 3 | Status: SHIPPED | OUTPATIENT
Start: 2021-02-19 | End: 2022-11-20

## 2021-02-19 RX ORDER — BLOOD-GLUCOSE METER
EACH MISCELLANEOUS
COMMUNITY
Start: 2021-02-01 | End: 2022-11-20

## 2021-02-19 RX ORDER — PEN NEEDLE, DIABETIC 32GX 5/32"
NEEDLE, DISPOSABLE MISCELLANEOUS
Qty: 100 EACH | Refills: 3 | Status: SHIPPED | OUTPATIENT
Start: 2021-02-19 | End: 2022-11-20

## 2021-02-19 NOTE — PROGRESS NOTES
"Doing well overall.   Here with Marcelo today.   She has been meeting with diabetes ed, sugars have been running high so started on insulin. Hasn't picked it up, just ordered it.   She had a \"soft\" fall on the ice yesterday, came in to [facility] for monitoring and everything was ok. No bleeding.  ABO/RH T&S done today, rhogam and Tdap given.   We discussed A2 GDM, indication for earlier induction, more intensive fetal monitoring. Will do weekly BPP starting next week since she was in birthplace yesterday. Will increase to twice weekly after 36 weeks, and growth ultrasound at 32 and 36 weeks.   Discussed  hypoglycemia and rationale for strict glucose control in pregnancy. Discussed indications for CS, higher risk of CS with GDM. They are looking for prenatal classes to take.   We discussed birth plans as well, flexibility in birth plan due to medical indications.   Discussed COVID vaccine pros and cons. It is not yet available for her and she is undecided.           She has times where she doesn't feel baby as active. Discussed fetal kick counts and following up in birthplace if not feeling baby daily.   Discussed warning signs for preeclampsia.  Will f/u in clinic in 2 week(s) or sooner with any concern for decreased fetal movement, vaginal bleeding, fluid leak, headache, vision change, severe nausea or vomiting, abdominal pain, increased edema or other concerns.   Anyi Holder MD               "

## 2021-02-19 NOTE — DISCHARGE INSTRUCTIONS
OB Triage Discharge Instructions    Diet:  Regular diet as tolerated    Activity:  As tolerated    Other Special Instructions: fetal kick counts discussed    Reason for being seen in L&D: fall    Treatment/procedures performed in L&D: monitoring    Call the [facility] at [phone] if you have:  ? 5 or more contractions in one hour  ? Leaking of fluid from your vaginal area  ? Decreased fetal movement (follow kick count instructions)  ? Bleeding from your vaginal area  ? Swelling in your face, or increased swelling in your hands or legs  ? Headaches or vision problems such as blurring or seeing spots or starts  ? Nausea or vomiting lasting for more than 24 hours  ? An increase in weight (5lbs/week)  ? Epigastric pain (sometimes confused with heartburn that does not go away or a very bad stomach ache)    If you have any questions, please follow up with your doctor.        Patient Signature: ______________________________________________________________  By signing the above I acknowledge that a nurse or my care provider has explained the instruction to me and I have had any questions regarding my care explained to me.        Discharge Nurse Signature: _______________________________ 2/18/2021  6:09 PM    Method of discharge: walking    Accompanied by: self  Time of discharge: 1815

## 2021-02-19 NOTE — PROGRESS NOTES
"S:  Discharge from triage.   A:  FHT's 135-140, not tracing well last 20\". Much fetal activity heard on monitor,and felt by pt, Moderate variability, occas Accels present, no decels noted. Contractions none.  Cervical exam not done. Spoke with Dr. Lowe about Rhogam need, but she stated not needed as there was no abd trauma or bleeding.   R:  Written and verbal D/C instruction provided. Pt. Verbalized understanding of D/C instructions and will follow up with Dr. Holder tomorrow as previously scheduled.   Verbalizes understanding that she will call or return to the [facility] with any further questions or concerns.   Pt. D/C'd via walking with self at 1830      Nursing Discharge Checklist  Discharge order entered: Yes  Patient care order entered: Yes  Charges entered: Yes  IV start and stop times have been documented in Epic? No  NST start and stop times have been documented in Epic Doc F/S? No      "

## 2021-02-25 ENCOUNTER — PATIENT OUTREACH (OUTPATIENT)
Dept: EDUCATION SERVICES | Facility: CLINIC | Age: 41
End: 2021-02-25
Payer: COMMERCIAL

## 2021-02-25 ENCOUNTER — MYC MEDICAL ADVICE (OUTPATIENT)
Dept: EDUCATION SERVICES | Facility: CLINIC | Age: 41
End: 2021-02-25

## 2021-02-25 DIAGNOSIS — O24.419 GESTATIONAL DIABETES: Primary | ICD-10-CM

## 2021-02-25 PROCEDURE — G0108 DIAB MANAGE TRN  PER INDIV: HCPCS | Mod: TEL

## 2021-02-25 RX ORDER — URINE GLUCOSE-ACET TEST STRIP
1 STRIP MISCELLANEOUS DAILY
Qty: 50 STRIP | Refills: 0 | Status: SHIPPED | OUTPATIENT
Start: 2021-02-25 | End: 2022-11-20

## 2021-02-25 NOTE — PROGRESS NOTES
Diabetes Self-Management Education & Support  Type of Service: Telephone Visit    How would patient like to obtain AVS? [employer]    SUBJECTIVE/OBJECTIVE:  Presents for education related to gestational diabetes.         Cultural Influences/Ethnic Background:  American      LMP 08/06/2020 (Exact Date)         Estimated Date of Delivery: May 13, 2021    Blood Glucose/Ketone Log:   Date Ketones Fasting Post Breakfast Post Lunch Post Supper   2/19  98 139 125 97  Started insulin   2/20  91 116 124 100*   2/21  106 132 138 126   2/22  89 111 110 148   2/23  90 121 134 128   2/24  92 101 119 142  Chicken, 1/2 cup beans/potato   2/25  98          Lifestyle and Health Behaviors:       Healthy Coping:       Current Management:       ASSESSMENT:  Ketones: not testing yet.   Fasting blood glucoses: 67% in target. (since starting insulin)  After breakfast: 100% in target.  After lunch: 100% in target.  After dinner: 67% in target.    Fastings BGs improved since starting insulin.  Pt reports taking 15 units at bedtime.  Recommended increase to 17 units IF fasting number is >95 on Friday or Saturday morning.  Informed patient that insulin can be increased every 3-5 days, as needed.  Patient should check in with us early next week via phone call or [employer] message.    Requested pt start testing urine ketones.  Had not started previously due to time constraints with past appointments.  Ordered strips today.  Pt will report these with her BG results next week.    Continue GDM diet.    INTERVENTION:  Educational topics covered today:  What to expect after delivery, Future testing for Type 2 diabetes (2 hour OGTT at 6 week post-partum check-up and annual fasting blood glucose level), Risk of GDM and planning ahead for future pregnancies, Recommended lifestyle interventions for reducing the risk of Type 2 Diabetes, When to Call a Diabetes Educator or OB Provider    Educational Materials provided today:  [place] Preventing  Diabetes    PLAN:  Continue 15 units NPH.  If FBG is >95 on Friday or Saturday, increase to 17 units.  Check glucose 4 times daily.  Check ketones once a week when readings are consistently negative.  Continue with recommended physical activity.  Continue to follow recommended meal plan: 2-3 carbs at breakfast, 3-4 carbs at lunch, 3-4 carbs at supper, 1-2 carbs at snacks.  Follow consistent CHO meal plan, eat CHO and protein/fat at all meals/snacks.    Call/e-mail/MyChart message diabetes educator if 3 or more blood sugars are above the goal in 1 week or if ketones are positive.    ALEJANDRA Grant CDE    Time Spent: 30 minutes  Encounter Type: Individual    Any diabetes medication dose changes were made via the CDE Protocol and Collaborative Practice Agreement with the patient's OB/GYN provider. A copy of this encounter was shared with the provider.

## 2021-02-25 NOTE — PATIENT INSTRUCTIONS
Follow up with diabetes education for blood glucose and ketone review on 2/28 or 2/29.    Plan to share your glucose and ketone information with diabetes education once a week, unless otherwise directed.     Continue 15 units NPH insulin at bedtime.  If fasting BG is >95 on Friday or Saturday, increase to 17 units.    1. Check glucose 4 times daily, before breakfast daily and 1 hour after each meal, as recommended.    2. Check ketones daily or once a week after they have been negative for 7 days in a row. If ketones are elevated, let your diabetes educator know and continue to check daily until they are negative for 7 days in a row.    3. Continue with recommended physical activity.    4. Continue to follow recommended meal plan: 2-3 carbs at breakfast, 3-4 carbs at lunch, 3-4 carbs at supper, 1-2 carbs at snacks.  Follow consistent CHO meal plan, eat CHO and protein/fat at all meals/snacks.    5. Follow-up with OB doctor as recommended.    6. Call or MyChart message your diabetes educator if 3 or more blood sugars are above the goal in 1 week or if ketones are elevated (trace or above).       AFTER YOU DELIVER:  - Continue with healthy eating and physical activity to get back to your pre-pregnancy weight.   - Have a follow-up 2-hour Glucose Tolerance Test at your 6-week post-partum check-up.   - Have your fasting blood sugar checked once a year.  - Plan ahead for future pregnancies - eat healthy, keep active, work with your doctor to check for gestational diabetes early on in the pregnancy and check blood sugars as recommended by your doctor.

## 2021-02-26 ENCOUNTER — HOSPITAL ENCOUNTER (OUTPATIENT)
Dept: ULTRASOUND IMAGING | Facility: CLINIC | Age: 41
Discharge: HOME | End: 2021-02-26
Attending: FAMILY MEDICINE | Admitting: FAMILY MEDICINE
Payer: COMMERCIAL

## 2021-02-26 DIAGNOSIS — O09.529 SUPERVISION OF HIGH-RISK PREGNANCY OF ELDERLY MULTIGRAVIDA: ICD-10-CM

## 2021-02-26 DIAGNOSIS — E03.9 HYPOTHYROIDISM, UNSPECIFIED TYPE: ICD-10-CM

## 2021-02-26 PROCEDURE — 76819 FETAL BIOPHYS PROFIL W/O NST: CPT

## 2021-03-02 NOTE — TELEPHONE ENCOUNTER
Gestational Diabetes Follow-up    Subjective/Objective:    Estefania Arguello sent in blood glucose log for review. Last date of communication was: 2021.    Gestational diabetes is being managed with medications    Taking diabetes medications:   yes:     Diabetes Medication(s)     Insulin       insulin isophane human (HUMULIN N PEN) 100 UNIT/ML injection    Take 15 units at bedtime     Patient not taking: Reported on 2021          Estimated Date of Delivery: May 13, 2021    BG/Food Lo/25   98   109   109   153   Insulin 15 units      keytone negative   90   101   109   110   Insulin 15units       Keytone negative   108   130   116   148   Insulin 17 units       Keytone negative   96   107   76   116   Insulin 17 units     3/1  keytone negative   88   122   158   149   Insulin 17 units     Assessment since last increase :    Ketones: neg.   Fasting blood glucoses: 50% in target, trending down  After breakfast: 100% in target.  After lunch: 100% in target.  After dinner: 100% in target.    Plan/Response:  No changes in the patient's current treatment plan.  Follow-up in 3-4 days.    Kiki Velásquez RD LD CDE    Any diabetes medication dose changes were made via the CDE Protocol and Collaborative Practice Agreement with the patient's OB/GYN provider. A copy of this encounter was shared with the provider.

## 2021-03-05 ENCOUNTER — MYC MEDICAL ADVICE (OUTPATIENT)
Dept: EDUCATION SERVICES | Facility: CLINIC | Age: 41
End: 2021-03-05

## 2021-03-05 ENCOUNTER — HOSPITAL ENCOUNTER (OUTPATIENT)
Dept: ULTRASOUND IMAGING | Facility: CLINIC | Age: 41
Discharge: HOME | End: 2021-03-05
Attending: FAMILY MEDICINE | Admitting: FAMILY MEDICINE
Payer: COMMERCIAL

## 2021-03-05 ENCOUNTER — PRENATAL OFFICE VISIT (OUTPATIENT)
Dept: FAMILY MEDICINE | Facility: CLINIC | Age: 41
End: 2021-03-05
Payer: COMMERCIAL

## 2021-03-05 VITALS
WEIGHT: 218.8 LBS | SYSTOLIC BLOOD PRESSURE: 120 MMHG | DIASTOLIC BLOOD PRESSURE: 62 MMHG | BODY MASS INDEX: 35.67 KG/M2 | TEMPERATURE: 97.3 F | RESPIRATION RATE: 16 BRPM | HEART RATE: 88 BPM | OXYGEN SATURATION: 98 %

## 2021-03-05 DIAGNOSIS — O26.899 RH NEGATIVE STATE IN ANTEPARTUM PERIOD: ICD-10-CM

## 2021-03-05 DIAGNOSIS — O09.529 SUPERVISION OF HIGH-RISK PREGNANCY OF ELDERLY MULTIGRAVIDA: ICD-10-CM

## 2021-03-05 DIAGNOSIS — E03.9 HYPOTHYROIDISM, UNSPECIFIED TYPE: ICD-10-CM

## 2021-03-05 DIAGNOSIS — O24.419 GDM, CLASS A2: ICD-10-CM

## 2021-03-05 DIAGNOSIS — O09.529 SUPERVISION OF HIGH-RISK PREGNANCY OF ELDERLY MULTIGRAVIDA: Primary | ICD-10-CM

## 2021-03-05 DIAGNOSIS — Z67.91 RH NEGATIVE STATE IN ANTEPARTUM PERIOD: ICD-10-CM

## 2021-03-05 PROCEDURE — 76819 FETAL BIOPHYS PROFIL W/O NST: CPT

## 2021-03-05 PROCEDURE — 99207 PR COMPLICATED OB VISIT: CPT | Performed by: FAMILY MEDICINE

## 2021-03-07 NOTE — PROGRESS NOTES
Doing well overall.  No bleeding, no regular ctx.   No HA/vision change/n/v.    No edema. No ketones, checking once weekly.   Glucoses mostly Normal on 17 units insulin once daily. She was running higher on her fasting numbers, working with diabetes ed weekly or prn.   She needs FMLA papers signed today, planning to work until delivery if able, taking 12 weeks or as much as possible off afterward.   Will be due for TSH recheck next visit in 2 weeks.   Has sono weekly for BPP. Next growth sono at 32 weeks and again at 36 weeks.   Discussed  labor.   Discussed warning signs for preeclampsia.  Will f/u sooner with any concern for decreased fetal movement, vaginal bleeding, fluid leak, headache, vision change, severe nausea or vomiting, abdominal pain, increased edema or other concerns.   Anyi Holder MD

## 2021-03-08 NOTE — TELEPHONE ENCOUNTER
Gestational Diabetes Follow-up    Subjective/Objective:    Estefania Arguello sent in blood glucose log for review. Last date of communication was: 3-2-21.    Gestational diabetes is being managed with diet, activity and medications    Taking diabetes medications:   yes:     Diabetes Medication(s)     Insulin       insulin isophane human (HUMULIN N PEN) 100 UNIT/ML injection    Take 17 units at bedtime          Estimated Date of Delivery: May 13, 2021    BG/Food Log:   3/2  97  113  116  109  Insulin 17 units     3/3  94  100  107  156 after 2 hrs 116  Insulin 17 units     3/4  75  121  123  110  Insulin 17 units     3/5  89  143  94  96  Insulin 17 units                      Assessment:    Ketones: Not noted.   Fasting blood glucoses: 75% in target.  After breakfast: 75% in target.  After lunch: 100% in target.  After dinner: 75% in target.    Plan/Response:  No changes in the patient's current treatment plan.  Follow-up on Thursday.  MyChart response sent.    Roro Hickman RN, CDCES      Any diabetes medication dose changes were made via the CDE Protocol and Collaborative Practice Agreement with the patient's OB/GYN provider. A copy of this encounter was shared with the provider.

## 2021-03-12 ENCOUNTER — HOSPITAL ENCOUNTER (OUTPATIENT)
Dept: ULTRASOUND IMAGING | Facility: CLINIC | Age: 41
Discharge: HOME | End: 2021-03-12
Attending: FAMILY MEDICINE | Admitting: FAMILY MEDICINE
Payer: COMMERCIAL

## 2021-03-12 ENCOUNTER — MYC MEDICAL ADVICE (OUTPATIENT)
Dept: EDUCATION SERVICES | Facility: CLINIC | Age: 41
End: 2021-03-12

## 2021-03-12 DIAGNOSIS — O24.419 GDM, CLASS A2: ICD-10-CM

## 2021-03-12 DIAGNOSIS — O09.529 SUPERVISION OF HIGH-RISK PREGNANCY OF ELDERLY MULTIGRAVIDA: ICD-10-CM

## 2021-03-12 DIAGNOSIS — E03.9 HYPOTHYROIDISM, UNSPECIFIED TYPE: ICD-10-CM

## 2021-03-12 PROCEDURE — 76819 FETAL BIOPHYS PROFIL W/O NST: CPT

## 2021-03-12 NOTE — TELEPHONE ENCOUNTER
Gestational Diabetes Follow-up    Subjective/Objective:    Estefania Arguello sent in blood glucose log for review. Last date of communication was: 3/5/21.    Gestational diabetes is being managed with diet, activity and medications    Taking diabetes medications:   yes:     Diabetes Medication(s)     Insulin       insulin isophane human (HUMULIN N PEN) 100 UNIT/ML injection    Take 17 units at bedtime          Estimated Date of Delivery: May 13, 2021    BG/Food Log:   See [employer] message    Assessment:  Blood sugars after meals are in target, but fasting blood sugar the past two nights has been elevated. Recommend increase to insulin dose to prevent further elevated fasting readings.     Ketones: none reported.   Fasting blood glucoses: 67% in target.  After breakfast: 100% in target.  After lunch: 100% in target.  After dinner: 100% in target.    Plan/Response:  Recommend decrease to insulin - Humulin 0-0-0-17 --> 0-0-0-19, if fasting reading remains elevated OK to further increase to 0-0-0-21.    Follow up 1 week.    Joanna Benton MS, RD, LD, CDE      Any diabetes medication dose changes were made via the CDE Protocol and Collaborative Practice Agreement with the patient's OB/GYN provider. A copy of this encounter was shared with the provider.

## 2021-03-19 ENCOUNTER — HOSPITAL ENCOUNTER (OUTPATIENT)
Dept: ULTRASOUND IMAGING | Facility: CLINIC | Age: 41
Discharge: HOME | End: 2021-03-19
Attending: FAMILY MEDICINE | Admitting: FAMILY MEDICINE
Payer: COMMERCIAL

## 2021-03-19 ENCOUNTER — MYC MEDICAL ADVICE (OUTPATIENT)
Dept: EDUCATION SERVICES | Facility: CLINIC | Age: 41
End: 2021-03-19

## 2021-03-19 ENCOUNTER — PRENATAL OFFICE VISIT (OUTPATIENT)
Dept: FAMILY MEDICINE | Facility: CLINIC | Age: 41
End: 2021-03-19
Payer: COMMERCIAL

## 2021-03-19 VITALS
WEIGHT: 221 LBS | HEART RATE: 109 BPM | OXYGEN SATURATION: 98 % | BODY MASS INDEX: 36.03 KG/M2 | SYSTOLIC BLOOD PRESSURE: 122 MMHG | TEMPERATURE: 98.4 F | RESPIRATION RATE: 16 BRPM | DIASTOLIC BLOOD PRESSURE: 62 MMHG

## 2021-03-19 DIAGNOSIS — O24.419 GDM, CLASS A2: ICD-10-CM

## 2021-03-19 DIAGNOSIS — O09.529 SUPERVISION OF HIGH-RISK PREGNANCY OF ELDERLY MULTIGRAVIDA: ICD-10-CM

## 2021-03-19 DIAGNOSIS — E03.9 HYPOTHYROIDISM, UNSPECIFIED TYPE: ICD-10-CM

## 2021-03-19 PROCEDURE — 99207 PR COMPLICATED OB VISIT: CPT | Performed by: FAMILY MEDICINE

## 2021-03-19 PROCEDURE — 76819 FETAL BIOPHYS PROFIL W/O NST: CPT

## 2021-03-19 PROCEDURE — 76816 OB US FOLLOW-UP PER FETUS: CPT

## 2021-03-19 NOTE — TELEPHONE ENCOUNTER
Gestational Diabetes Follow-up    Subjective/Objective:    Estefania Arguello sent in blood glucose log for review. Last date of communication was: 3/12/21.    Gestational diabetes is being managed with diet, activity and medications    Taking diabetes medications:   yes:     Diabetes Medication(s)     Insulin       insulin  UNIT/ML injection    Take 19 units at bedtime          Estimated Date of Delivery: May 13, 2021    BG/Food Log:   3/12  91  104  2hr  99. 2hr  106. 2hr  Insulin 19 units     3/13  88  90. 2hr  95. 2hr  135. 2hr  Insulin 19     3/14 (out of town a few days and my schedule got all messed up)  107 (forgot to take until over an 1hr and after coffee/creamer)  89 1hr  136  2hr  134. 1.5 hrs  Insulin 19     3/15  95  88. 2hr  121. 2hr  139. 2 hr  19 units     3/16  91  107. 2hr  91  2hr  139. 1hr  19 units     3/17  100  74. 2hr  105 2hr  123. 2hr  Increased insulin 21 units     3/18  90  84  2.5hr  113  2hr  116. 2hr  21 units     3/19  80  100  2hr      Assessment:  Blood sugars in target, but lunch and dinner have mixed elevations.  Patient may eventually need daytime dose.  Recommend sticking to 21 units, follow up in 1 week then if any more post lunch or dinner elevations prior consider starting 2 units with breakfast.    Ketones: none reported.   Fasting blood glucoses: 100% in target.  After breakfast: 100% in target.  After lunch: 63% in target.  After dinner: 57% in target.    Plan/Response:  No changes in the patient's current treatment plan.    Follow up 1 week, sooner if post lunch and dinner elevations continue.    Joanna Benton MS, RD, LD, CDE      Any diabetes medication dose changes were made via the CDE Protocol and Collaborative Practice Agreement with the patient's OB/GYN provider. A copy of this encounter was shared with the provider.

## 2021-03-21 NOTE — PROGRESS NOTES
Doing well overall.  No bleeding, no regular ctx   Her sugars are normal except continues to have some elevated fasting am numbers, increased to 21 units of lantus.   Postprandial numbers 100% in target. Will report later today again to DM Ed, does so weekly.   She has ultrasound today for growth and BPP.   We discussed potential growth issues with GDM, will await report.   Discussed warning signs for preeclampsia.  Will f/u with repeat BPP in 1 week and clinic visit in 2 week(s) or sooner with any concern for decreased fetal movement, vaginal bleeding, fluid leak, headache, vision change, severe nausea or vomiting, abdominal pain, increased edema or other concerns.   Anyi Holder MD

## 2021-03-22 NOTE — RESULT ENCOUNTER NOTE
Ann Marie, here is the full report of your recent ultrasound:  Overall the baby's size is good, at 41%, weighing just over 4 lbs. I'm happy about this. Baby is breech but still has time to move. Baby's well being score was 8 out of 8, so perfect.   We'll review again at your next visit.   Anyi Holder MD

## 2021-03-25 ENCOUNTER — MYC MEDICAL ADVICE (OUTPATIENT)
Dept: EDUCATION SERVICES | Facility: CLINIC | Age: 41
End: 2021-03-25

## 2021-03-26 ENCOUNTER — HOSPITAL ENCOUNTER (OUTPATIENT)
Dept: ULTRASOUND IMAGING | Facility: CLINIC | Age: 41
Discharge: HOME | End: 2021-03-26
Attending: FAMILY MEDICINE | Admitting: FAMILY MEDICINE
Payer: COMMERCIAL

## 2021-03-26 DIAGNOSIS — O09.529 SUPERVISION OF HIGH-RISK PREGNANCY OF ELDERLY MULTIGRAVIDA: ICD-10-CM

## 2021-03-26 DIAGNOSIS — E03.9 HYPOTHYROIDISM, UNSPECIFIED TYPE: ICD-10-CM

## 2021-03-26 PROCEDURE — 76819 FETAL BIOPHYS PROFIL W/O NST: CPT

## 2021-03-26 NOTE — RESULT ENCOUNTER NOTE
Estefania, glad to see everything looks good! Baby scored 8 out of 8 points, normal position and fluid:)  Anyi Holder MD

## 2021-03-29 ENCOUNTER — MYC MEDICAL ADVICE (OUTPATIENT)
Dept: EDUCATION SERVICES | Facility: CLINIC | Age: 41
End: 2021-03-29

## 2021-03-29 NOTE — TELEPHONE ENCOUNTER
Gestational Diabetes Follow-up    Subjective/Objective:    Estefania MENDOZA Saundra sent in blood glucose log for review. Last date of communication was: 3/25/21.    Gestational diabetes is being managed with diet, activity and medications    Taking diabetes medications:   yes:     Diabetes Medication(s)     Insulin       insulin  UNIT/ML injection    Take 21 units at bedtime          Estimated Date of Delivery: May 13, 2021    BG/Food Log:  I just wanted to send my numbers along, I ve had some very high numbers after dinner. My schedule has been really tricky on the weekends lately, we gutted our kitchen and have had some very late dinners.      3/25  93  73. 2hr  87. 2hr  101.  1hr  21 units     3/26  87  92. 2hr  106. 2hr  150. 2hr. (Had grilled chicken taco with bulk beans, lettuce, cheese, salsa and sour cream. Also like 7 tortilla chips)  21 units     3/27   89  91. 2hrs  115. 2hrs  194. 1hr after eating 1 small slice of meatloaf and a small baked potato with cheese and sour cream.  Insulin 21 units   Checked again after 2hrs and was 127     3/28  89  98  2hrs  96  2hrs  96. 1hr  21 units     3/29   88  94.  2hrs       Assessment:    Ketones: na.   Fasting blood glucoses: 100% in target.  After breakfast: 100% in target.  Before lunch: -% in target.  After lunch: 100% in target.  Before dinner: -% in target.  After dinner: 50% in target.    Plan/Response:  Gerson Mac,  Thank your for sending your blood sugars and the additional details.  It's looks like your portions were appropriate on 3/26 an 3/27 but your body didn't tolerate those foods very well.  As the pregnancy progresses you may find your blood sugars struggling with foods you previously tolerated well.Another things to consider is the timing of your snack before that meal, was it close to the meal or did you wait too long which may cause your liver to kick in and give you some extra sugar.     Since 2 of your dinner readings are in goal, lets have you  keep an eye on it for now and send your numbers again on Thursday or sooner if you continue to have high dinner readings.     Keep up the good work!  Tania Crooks RN, [facility]         Any diabetes medication dose changes were made via the CDE Protocol and Collaborative Practice Agreement with the patient's referring provider. A copy of this encounter was shared with the provider.

## 2021-04-01 ENCOUNTER — MYC MEDICAL ADVICE (OUTPATIENT)
Dept: EDUCATION SERVICES | Facility: CLINIC | Age: 41
End: 2021-04-01

## 2021-04-02 ENCOUNTER — HOSPITAL ENCOUNTER (OUTPATIENT)
Dept: ULTRASOUND IMAGING | Facility: CLINIC | Age: 41
Discharge: HOME | End: 2021-04-02
Attending: FAMILY MEDICINE | Admitting: FAMILY MEDICINE
Payer: COMMERCIAL

## 2021-04-02 ENCOUNTER — PRENATAL OFFICE VISIT (OUTPATIENT)
Dept: FAMILY MEDICINE | Facility: CLINIC | Age: 41
End: 2021-04-02
Payer: COMMERCIAL

## 2021-04-02 VITALS
WEIGHT: 222 LBS | BODY MASS INDEX: 36.19 KG/M2 | DIASTOLIC BLOOD PRESSURE: 64 MMHG | TEMPERATURE: 97.1 F | RESPIRATION RATE: 14 BRPM | OXYGEN SATURATION: 99 % | HEART RATE: 104 BPM | SYSTOLIC BLOOD PRESSURE: 122 MMHG

## 2021-04-02 DIAGNOSIS — O12.13 GESTATIONAL PROTEINURIA IN THIRD TRIMESTER: ICD-10-CM

## 2021-04-02 DIAGNOSIS — E03.9 HYPOTHYROIDISM, UNSPECIFIED TYPE: ICD-10-CM

## 2021-04-02 DIAGNOSIS — O09.529 SUPERVISION OF HIGH-RISK PREGNANCY OF ELDERLY MULTIGRAVIDA: ICD-10-CM

## 2021-04-02 DIAGNOSIS — O09.529 SUPERVISION OF HIGH-RISK PREGNANCY OF ELDERLY MULTIGRAVIDA: Primary | ICD-10-CM

## 2021-04-02 DIAGNOSIS — H53.9 VISION CHANGES: ICD-10-CM

## 2021-04-02 DIAGNOSIS — O24.419 GDM, CLASS A2: ICD-10-CM

## 2021-04-02 LAB
ALBUMIN SERPL-MCNC: 2.5 G/DL (ref 3.4–5)
ALP SERPL-CCNC: 71 U/L (ref 40–150)
ALT SERPL W P-5'-P-CCNC: 29 U/L (ref 0–50)
ANION GAP SERPL CALCULATED.3IONS-SCNC: 5 MMOL/L (ref 3–14)
AST SERPL W P-5'-P-CCNC: 16 U/L (ref 0–45)
BILIRUB SERPL-MCNC: 0.2 MG/DL (ref 0.2–1.3)
BUN SERPL-MCNC: 13 MG/DL (ref 7–30)
CALCIUM SERPL-MCNC: 8.4 MG/DL (ref 8.5–10.1)
CHLORIDE SERPL-SCNC: 105 MMOL/L (ref 94–109)
CO2 SERPL-SCNC: 26 MMOL/L (ref 20–32)
CREAT SERPL-MCNC: 0.54 MG/DL (ref 0.52–1.04)
CREAT UR-MCNC: 32 MG/DL
ERYTHROCYTE [DISTWIDTH] IN BLOOD BY AUTOMATED COUNT: 12.9 % (ref 10–15)
GFR SERPL CREATININE-BSD FRML MDRD: >90 ML/MIN/{1.73_M2}
GLUCOSE SERPL-MCNC: 93 MG/DL (ref 70–99)
HCT VFR BLD AUTO: 37.7 % (ref 35–47)
HGB BLD-MCNC: 12.7 G/DL (ref 11.7–15.7)
MCH RBC QN AUTO: 33.1 PG (ref 26.5–33)
MCHC RBC AUTO-ENTMCNC: 33.7 G/DL (ref 31.5–36.5)
MCV RBC AUTO: 98 FL (ref 78–100)
PLATELET # BLD AUTO: 246 10E9/L (ref 150–450)
POTASSIUM SERPL-SCNC: 4.1 MMOL/L (ref 3.4–5.3)
PROT SERPL-MCNC: 6.6 G/DL (ref 6.8–8.8)
PROT UR-MCNC: 0.08 G/L
PROT/CREAT 24H UR: 0.25 G/G CR (ref 0–0.2)
RBC # BLD AUTO: 3.84 10E12/L (ref 3.8–5.2)
SODIUM SERPL-SCNC: 136 MMOL/L (ref 133–144)
TSH SERPL DL<=0.005 MIU/L-ACNC: 0.74 MU/L (ref 0.4–4)
URATE SERPL-MCNC: 3.2 MG/DL (ref 2.6–6)
WBC # BLD AUTO: 6.7 10E9/L (ref 4–11)

## 2021-04-02 PROCEDURE — 76819 FETAL BIOPHYS PROFIL W/O NST: CPT

## 2021-04-02 PROCEDURE — 85027 COMPLETE CBC AUTOMATED: CPT | Performed by: FAMILY MEDICINE

## 2021-04-02 PROCEDURE — 84156 ASSAY OF PROTEIN URINE: CPT | Performed by: FAMILY MEDICINE

## 2021-04-02 PROCEDURE — 99207 PR COMPLICATED OB VISIT: CPT | Performed by: FAMILY MEDICINE

## 2021-04-02 PROCEDURE — 80053 COMPREHEN METABOLIC PANEL: CPT | Performed by: FAMILY MEDICINE

## 2021-04-02 PROCEDURE — 84443 ASSAY THYROID STIM HORMONE: CPT | Performed by: FAMILY MEDICINE

## 2021-04-02 PROCEDURE — 84550 ASSAY OF BLOOD/URIC ACID: CPT | Performed by: FAMILY MEDICINE

## 2021-04-02 PROCEDURE — 36415 COLL VENOUS BLD VENIPUNCTURE: CPT | Performed by: FAMILY MEDICINE

## 2021-04-02 NOTE — RESULT ENCOUNTER NOTE
Estefania, here are your labs. Your thyroid is normal, so continue your current dose of 100 mcg daily.   Your urine protein is abnormal, just slightly high. The rest of your blood tests are normal, liver, kidneys and blood count.   I initially ordered your urine test incorrectly, and I understand they wanted to give you the jug to collect your urine for 24 hours. Sorry for that mistake on my part. Now, however, since the spot check test is abnormal, we DO have to go through with that 24 hour urine collection test. So now I am RE-ORDERING the test where you will need a jug to collect all your urine for 24 hours. This one is more accurate in diagnosing kidney problems and pre-eclampsia.   So if you did NOT get the jug and instructions, you'll need to go back to the lab to pick that up.  If they gave it you already, just use it and turn it back in when you have collected your 24 hours.  If you have questions on how to do the collection please let me know.  Anyi Holder MD

## 2021-04-02 NOTE — TELEPHONE ENCOUNTER
Gestational Diabetes Follow-up    Subjective/Objective:    Estefania Arguello sent in blood glucose log for review. Last date of communication was: 3/29/21.    Gestational diabetes is being managed with diet, activity and medications    Taking diabetes medications:   yes:     Diabetes Medication(s)     Insulin       insulin  UNIT/ML injection    Take 21 units at bedtime          Estimated Date of Delivery: May 13, 2021    BG/Food Log:   3/29  88  94  117. 2hr  110. 2hr  Insulin 21 units     3/30  107  102. 2hr  103. 2hr  112. 1hr  Insulin 21 units     3/31  82  135. 1hr. 85  2hr  84.  2hr  117. 1hr  Insulin 21 units     4/1  102  91  2hr  88. 2hr  129  Insulin 21 units      Assessment:    Ketones: NA.   Fasting blood glucoses: 50% in target.  After breakfast: 100% in target.  After lunch: 100% in target.  After dinner: 100% in target.    Plan/Response:  Recommend increase to insulin - 0-0-0-21 --> 0-0-0-23.  Follow-up in 3-4 days.  See [facility] [employer] message for patient communication    Karlee Paul RD, LD, [facility]     Any diabetes medication dose changes were made via the CDE Protocol and Collaborative Practice Agreement with the patient's OB/GYN provider. A copy of this encounter was shared with the provider.

## 2021-04-05 NOTE — PROGRESS NOTES
Doing well overall. No bleeding, no regular ctx.   She did have some vision changes now and then, describes spots in her peripheral vision when getting out of tub. Discussed hypotension/presyncope, which is what this sounds like, but due to her high risk pregnancy will check pre-eclampsia labs. Her BP is not elevated, no significant swelling.   Her glucoses occasionally go high, 50% of fasting am sugars over goal, she is following up weekly with diabetes ed and her insulin is now increased to 23 units nightly.   Postprandial glucoses all in target range.   BPP 8/8 today.   Discussed timing of COVID vaccine related to breastfeeding, benefits of passive immunity to baby and potential risk due to unknown/new vaccine.   Also checking TSH today due to hypothyroidism, will notify with results.   Discussed warning signs for preeclampsia.  Will f/u in 2 week(s) with visit, 1 week with ultrasound, or sooner with any concern for decreased fetal movement, vaginal bleeding, fluid leak, headache, vision change, severe nausea or vomiting, abdominal pain, increased edema or other concerns.   Anyi Holder MD

## 2021-04-06 ENCOUNTER — MYC MEDICAL ADVICE (OUTPATIENT)
Dept: EDUCATION SERVICES | Facility: CLINIC | Age: 41
End: 2021-04-06

## 2021-04-06 DIAGNOSIS — O24.419 GDM, CLASS A2: ICD-10-CM

## 2021-04-06 NOTE — TELEPHONE ENCOUNTER
Gestational Diabetes Follow-up    Subjective/Objective:    Estefania MENDOZA Saundra sent in blood glucose log for review. Last date of communication was: 4/1/2021.    Gestational diabetes is being managed with diet, activity and medications    Taking diabetes medications:   yes:     Diabetes Medication(s)     Insulin       insulin  UNIT/ML injection    Take 23 units at bedtime          Estimated Date of Delivery: May 13, 2021    BG/Food Log:         Assessment:    Ketones: na.   Fasting blood glucoses: 40% in target.  After breakfast: 80% in target.  Before lunch: x% in target.  After lunch: 100% in target.  Before dinner: x% in target.  After dinner: 75% in target.    Plan/Response:  Recommend increase to insulin - NPH 0-0-0-23 => 0-0-0-25    Gerson Mac, danielle friend, yes will have you increase your NPH insulin to 25 units tonight.  As for the question on the spinach and cheese omelette, reason it is higher is your fasting is higher, sometimes that can make a difference.      Other than that, looking good,  And thank you for your numbers, let's evaluate again this Friday.  Take care and be safe, oh if you can do a ketone test, that would be great    Rose Wong RN/CAROLE  [place] [facility]          Any diabetes medication dose changes were made via the CDE Protocol and Collaborative Practice Agreement with the patient's primary care provider and OB/GYN provider. A copy of this encounter was shared with the provider.

## 2021-04-09 ENCOUNTER — HOSPITAL ENCOUNTER (OUTPATIENT)
Dept: ULTRASOUND IMAGING | Facility: CLINIC | Age: 41
Discharge: HOME | End: 2021-04-09
Attending: FAMILY MEDICINE | Admitting: FAMILY MEDICINE
Payer: COMMERCIAL

## 2021-04-09 DIAGNOSIS — O09.529 SUPERVISION OF HIGH-RISK PREGNANCY OF ELDERLY MULTIGRAVIDA: ICD-10-CM

## 2021-04-09 DIAGNOSIS — E03.9 HYPOTHYROIDISM, UNSPECIFIED TYPE: ICD-10-CM

## 2021-04-09 DIAGNOSIS — O12.13 GESTATIONAL PROTEINURIA IN THIRD TRIMESTER: ICD-10-CM

## 2021-04-09 LAB
COLLECT DURATION TIME UR: 24 H
CREAT 24H UR-MRATE: 1.46 G/(24.H) (ref 0.8–1.8)
CREAT UR-MCNC: 60 MG/DL
PROT 24H UR-MRATE: 0.33 G/(24.H) (ref 0.04–0.23)
PROT UR-MCNC: 0.14 G/L
PROT/CREAT 24H UR: 0.23 G/G CR (ref 0–0.2)
SPECIMEN VOL UR: 2420 ML

## 2021-04-09 PROCEDURE — 76819 FETAL BIOPHYS PROFIL W/O NST: CPT

## 2021-04-09 PROCEDURE — 84156 ASSAY OF PROTEIN URINE: CPT | Performed by: FAMILY MEDICINE

## 2021-04-09 PROCEDURE — 81050 URINALYSIS VOLUME MEASURE: CPT | Performed by: FAMILY MEDICINE

## 2021-04-10 ENCOUNTER — MYC MEDICAL ADVICE (OUTPATIENT)
Dept: EDUCATION SERVICES | Facility: CLINIC | Age: 41
End: 2021-04-10

## 2021-04-12 ENCOUNTER — HOSPITAL ENCOUNTER (OUTPATIENT)
Dept: ULTRASOUND IMAGING | Facility: CLINIC | Age: 41
Discharge: HOME | End: 2021-04-12
Attending: FAMILY MEDICINE | Admitting: FAMILY MEDICINE
Payer: COMMERCIAL

## 2021-04-12 DIAGNOSIS — O09.529 SUPERVISION OF HIGH-RISK PREGNANCY OF ELDERLY MULTIGRAVIDA: ICD-10-CM

## 2021-04-12 DIAGNOSIS — E03.9 HYPOTHYROIDISM, UNSPECIFIED TYPE: ICD-10-CM

## 2021-04-12 PROCEDURE — 76819 FETAL BIOPHYS PROFIL W/O NST: CPT

## 2021-04-12 NOTE — TELEPHONE ENCOUNTER
Gestational Diabetes Follow-up    Subjective/Objective:    Estefania Arguello sent in blood glucose log for review. Last date of communication was: .    Gestational diabetes is being managed with medications    Taking diabetes medications:   yes:     Diabetes Medication(s)     Insulin       insulin  UNIT/ML injection    Take 25 units at bedtime          Estimated Date of Delivery: May 13, 2021    BG/Food Lo/7   94   93. 2hr   96.  2hr   119. 1hr   25 units        93   85.  2.5hr   119. 2hr   142. 1.5hrs (definitely ate too much)   25 units        92   92. 2hrs   104. 2.5hrs   119.  1hr   25 units     4/10   90           I forgot to add that I checked my keytones on  and they were negative.          Assessment:    Ketones: neg.   Fasting blood glucoses: 100% in target.  After breakfast: 100% in target.  After lunch: 100% in target.  After dinner: 66% in target.    Morning numbers improved with last insulin increase.       Plan/Response:  No changes in the patient's current treatment plan.  Continue NPH 0-0-0-25  Follow-up in 3-4 days.  Decurate message sent back to pt.     ALEJANDRA Marc CDE      Any diabetes medication dose changes were made via the CDE Protocol and Collaborative Practice Agreement with the patient's OB/GYN provider. A copy of this encounter was shared with the provider.

## 2021-04-16 ENCOUNTER — HOSPITAL ENCOUNTER (OUTPATIENT)
Dept: ULTRASOUND IMAGING | Facility: CLINIC | Age: 41
Discharge: HOME | End: 2021-04-16
Attending: FAMILY MEDICINE | Admitting: FAMILY MEDICINE
Payer: COMMERCIAL

## 2021-04-16 ENCOUNTER — PRENATAL OFFICE VISIT (OUTPATIENT)
Dept: FAMILY MEDICINE | Facility: CLINIC | Age: 41
End: 2021-04-16
Payer: COMMERCIAL

## 2021-04-16 ENCOUNTER — MYC MEDICAL ADVICE (OUTPATIENT)
Dept: EDUCATION SERVICES | Facility: CLINIC | Age: 41
End: 2021-04-16

## 2021-04-16 VITALS
DIASTOLIC BLOOD PRESSURE: 62 MMHG | HEART RATE: 99 BPM | OXYGEN SATURATION: 100 % | RESPIRATION RATE: 16 BRPM | BODY MASS INDEX: 37.04 KG/M2 | SYSTOLIC BLOOD PRESSURE: 126 MMHG | TEMPERATURE: 97.3 F | WEIGHT: 227.2 LBS

## 2021-04-16 DIAGNOSIS — O09.529 SUPERVISION OF HIGH-RISK PREGNANCY OF ELDERLY MULTIGRAVIDA: ICD-10-CM

## 2021-04-16 DIAGNOSIS — O12.03 EDEMA DURING PREGNANCY IN THIRD TRIMESTER: ICD-10-CM

## 2021-04-16 DIAGNOSIS — O24.419 GDM, CLASS A2: ICD-10-CM

## 2021-04-16 DIAGNOSIS — E03.9 HYPOTHYROIDISM, UNSPECIFIED TYPE: ICD-10-CM

## 2021-04-16 DIAGNOSIS — Z67.91 RH NEGATIVE STATE IN ANTEPARTUM PERIOD: ICD-10-CM

## 2021-04-16 DIAGNOSIS — O09.529 SUPERVISION OF HIGH-RISK PREGNANCY OF ELDERLY MULTIGRAVIDA: Primary | ICD-10-CM

## 2021-04-16 DIAGNOSIS — O26.899 RH NEGATIVE STATE IN ANTEPARTUM PERIOD: ICD-10-CM

## 2021-04-16 PROCEDURE — 76816 OB US FOLLOW-UP PER FETUS: CPT

## 2021-04-16 PROCEDURE — 87653 STREP B DNA AMP PROBE: CPT | Performed by: FAMILY MEDICINE

## 2021-04-16 PROCEDURE — 99207 PR COMPLICATED OB VISIT: CPT | Performed by: FAMILY MEDICINE

## 2021-04-16 PROCEDURE — 76819 FETAL BIOPHYS PROFIL W/O NST: CPT

## 2021-04-16 NOTE — PROGRESS NOTES
Doing well overall.  No bleeding, no regular ctx. She does get headaches most days, goes away spontaneously. Yesterday her ha lingered a good part of the day.   She is more uncomfortable, baby feels lower.   Glucoses increased, am range , just had her insulin increased this am to 28 units.   Most of her daytime numbers are normal except a random value off target.   GBS sent.   Cervix posterior, soft but long and closed, baby anterior, -3, cephalic.   Slight edema, discussed compression socks, see order. BPs normal, will continue checking at home.   Discussed delivery by 39 weeks, sooner if not well controlled.   Discussed induction methods, cervical ripening, pitocin, risks and benefits, multi-day inductions, increased chance of CS.   Encouraged physical activity, no limit on sexual activity.   Discussed when to present for signs and symptoms of labor.   Will continue twice weekly BPP and close follow up with reporting glucoses to diabetes ed.   Discussed warning signs for preeclampsia.  Will f/u in 1 week(s) or sooner with any concern for decreased fetal movement, vaginal bleeding, fluid leak, headache, vision change, severe nausea or vomiting, abdominal pain, increased edema or other concerns.   Anyi Holder MD     DME (Durable Medical Equipment) Orders and Documentation  Orders Placed This Encounter   Procedures     Compression Sleeve/Stocking Order      The patient was assessed and it was determined the patient is in need of the following listed DME Supplies/Equipment. Please complete supporting documentation below to demonstrate medical necessity.      Compression Sleeve/Stocking(s) Supplies Documentation  The patient needs compression stockings for pregnancy related edema.

## 2021-04-16 NOTE — TELEPHONE ENCOUNTER
Gestational Diabetes Follow-up    Subjective/Objective:    Estefania MENDOZA Saundra sent in blood glucose log for review. Last date of communication was: 4-10-21.    Gestational diabetes is being managed with diet, activity and medications    Taking diabetes medications:   yes:     Diabetes Medication(s)     Insulin       insulin  UNIT/ML injection    Take 25 units at bedtime          Estimated Date of Delivery: May 13, 2021    BG/Food Lo/10  90  93.  [id number]  109.   146  1 hr  25 units       97  100   2hr  105   2hr  126  2hr  25 units       86  91  2hr  103  2hr  114  2hr  25units       102  87.  2hr  118.  2hr  115.   2hr   25units       96  76.  2hr  89.  2.5hrs  124.  1hr  25 units     4/15  96  83   2 hrs  94   2 hrs  111   1hr  25 units                      Assessment:    Ketones: Not noted.   Fasting blood glucoses: 33% in target.  After breakfast: 100% in target.  After lunch: 100% in target.  After dinner: 67% in target.    Plan/Response:  Recommend increase to insulin - NPH 0-0-0-28.  Follow-up on Tuesday.  MyChart response sent.    Roro Hickman RN, [facility]      Any diabetes medication dose changes were made via the CDE Protocol and Collaborative Practice Agreement with the patient's OB/GYN provider. A copy of this encounter was shared with the provider.

## 2021-04-17 LAB
GP B STREP DNA SPEC QL NAA+PROBE: NEGATIVE
SPECIMEN SOURCE: NORMAL

## 2021-04-19 ENCOUNTER — HOSPITAL ENCOUNTER (OUTPATIENT)
Dept: ULTRASOUND IMAGING | Facility: CLINIC | Age: 41
Discharge: HOME | End: 2021-04-19
Attending: FAMILY MEDICINE | Admitting: FAMILY MEDICINE
Payer: COMMERCIAL

## 2021-04-19 DIAGNOSIS — O09.529 SUPERVISION OF HIGH-RISK PREGNANCY OF ELDERLY MULTIGRAVIDA: ICD-10-CM

## 2021-04-19 DIAGNOSIS — E03.9 HYPOTHYROIDISM, UNSPECIFIED TYPE: ICD-10-CM

## 2021-04-19 PROCEDURE — 76819 FETAL BIOPHYS PROFIL W/O NST: CPT

## 2021-04-19 NOTE — RESULT ENCOUNTER NOTE
Ann Marie, here is the recent ultrasound report. We already reviewed all of this, nothing new.   Anyi Holder MD

## 2021-04-21 ENCOUNTER — MYC MEDICAL ADVICE (OUTPATIENT)
Dept: EDUCATION SERVICES | Facility: CLINIC | Age: 41
End: 2021-04-21

## 2021-04-22 NOTE — TELEPHONE ENCOUNTER
Gestational Diabetes Follow-up    Subjective/Objective:    Estefania Arguello sent in blood glucose log for review. Last date of communication was: 21    Gestational diabetes is being managed with diet, activity and medications    Taking diabetes medications: Injectable Medications: Humulin/Novolin NPH 0-0-0-28    Estimated Date of Delivery: May 13, 2021    BG/Food Lo/16  99  119.  2hr  99.     2.5hr  116.  2hr  28 units       85  107.  2hr  117.  2hr  131.  1hr  28 units     .   Keytones negative   90  117.  2hr  114.  2hr  100.  2hr  28 units       97  124.  1.75hr  112.  2hr  116.  2hr  28 units       84  101.  2hr  97.    2hr  100.  2hr  28 units       93  106.  2hr    Assessment:    Ketones: negative.   Fasting blood glucoses: 67% in target.  After breakfast: 100% in target.  After lunch: 100% in target.  After dinner: 100% in target.    Plan/Response:  Continue to check BG 4 times daily (fasting and one hour(s) after each meal).    No medication changes recommended at this time.  Recommend sending in blood sugar logs in 4-5 days for review and if needed, insulin adjustment.      Alis Sharma RD [facility]  [phone]

## 2021-04-23 ENCOUNTER — HOSPITAL ENCOUNTER (OUTPATIENT)
Dept: ULTRASOUND IMAGING | Facility: CLINIC | Age: 41
Discharge: HOME | End: 2021-04-23
Attending: FAMILY MEDICINE | Admitting: FAMILY MEDICINE
Payer: COMMERCIAL

## 2021-04-23 ENCOUNTER — PRENATAL OFFICE VISIT (OUTPATIENT)
Dept: FAMILY MEDICINE | Facility: CLINIC | Age: 41
End: 2021-04-23
Payer: COMMERCIAL

## 2021-04-23 VITALS
DIASTOLIC BLOOD PRESSURE: 66 MMHG | RESPIRATION RATE: 16 BRPM | SYSTOLIC BLOOD PRESSURE: 124 MMHG | HEART RATE: 85 BPM | BODY MASS INDEX: 37.3 KG/M2 | TEMPERATURE: 97.6 F | WEIGHT: 228.8 LBS | OXYGEN SATURATION: 99 %

## 2021-04-23 DIAGNOSIS — O26.899 RH NEGATIVE STATE IN ANTEPARTUM PERIOD: ICD-10-CM

## 2021-04-23 DIAGNOSIS — O09.529 SUPERVISION OF HIGH-RISK PREGNANCY OF ELDERLY MULTIGRAVIDA: Primary | ICD-10-CM

## 2021-04-23 DIAGNOSIS — E03.9 HYPOTHYROIDISM, UNSPECIFIED TYPE: ICD-10-CM

## 2021-04-23 DIAGNOSIS — O24.419 GDM, CLASS A2: ICD-10-CM

## 2021-04-23 DIAGNOSIS — O09.529 SUPERVISION OF HIGH-RISK PREGNANCY OF ELDERLY MULTIGRAVIDA: ICD-10-CM

## 2021-04-23 DIAGNOSIS — Z67.91 RH NEGATIVE STATE IN ANTEPARTUM PERIOD: ICD-10-CM

## 2021-04-23 PROCEDURE — 76819 FETAL BIOPHYS PROFIL W/O NST: CPT

## 2021-04-23 PROCEDURE — 99207 PR COMPLICATED OB VISIT: CPT | Performed by: FAMILY MEDICINE

## 2021-04-23 NOTE — PROGRESS NOTES
Doing well overall.  No bleeding, no regular ctx.   Feeling more pressure lower belly, some cramps but no ctx.   No leaking fluid, just more yellowy heavy discharge. No fever.   More headaches. Worse with mask wearing, but they go away.   Glucoses same, working with diabetes ed. Her PP glucoses are better, she is on 28 units of NPH at bedtime.   BPP 8/8 today.   We discussed cervix check and membrane stripping if possible next week.   We discussed induction on 5/6, possible admission night before if needed for cervical ripening or glucose control.   Discussed when to present for signs and symptoms of labor.   Discussed warning signs for preeclampsia.  Will f/u in 1 week(s) or sooner with any concern for decreased fetal movement, vaginal bleeding, fluid leak, headache, vision change, severe nausea or vomiting, abdominal pain, increased edema or other concerns.   Anyi Holder MD

## 2021-04-26 ENCOUNTER — HOSPITAL ENCOUNTER (OUTPATIENT)
Dept: ULTRASOUND IMAGING | Facility: CLINIC | Age: 41
Discharge: HOME | End: 2021-04-26
Attending: FAMILY MEDICINE | Admitting: FAMILY MEDICINE
Payer: COMMERCIAL

## 2021-04-26 DIAGNOSIS — E03.9 HYPOTHYROIDISM, UNSPECIFIED TYPE: ICD-10-CM

## 2021-04-26 DIAGNOSIS — O09.529 SUPERVISION OF HIGH-RISK PREGNANCY OF ELDERLY MULTIGRAVIDA: ICD-10-CM

## 2021-04-26 PROCEDURE — 76819 FETAL BIOPHYS PROFIL W/O NST: CPT

## 2021-04-26 NOTE — RESULT ENCOUNTER NOTE
Estefania, here is your biophysical score from today. Baby scored 8 out of 8 points again. :)   Anyi Holder MD

## 2021-04-26 NOTE — RESULT ENCOUNTER NOTE
Estefania, here is the normal full ultrasound report.   Baby scored 8 out of 8 points as you knew.   Anyi Holder MD

## 2021-04-27 ENCOUNTER — MYC MEDICAL ADVICE (OUTPATIENT)
Dept: EDUCATION SERVICES | Facility: CLINIC | Age: 41
End: 2021-04-27

## 2021-04-27 DIAGNOSIS — O24.419 GDM, CLASS A2: ICD-10-CM

## 2021-04-27 NOTE — TELEPHONE ENCOUNTER
Gestational Diabetes Follow-up    Subjective/Objective:    Estefania Arguello sent in blood glucose log for review. Last date of communication was: 4/21/21.    Gestational diabetes is being managed with diet, activity and medications    Taking diabetes medications:   yes:     Diabetes Medication(s)     Insulin       insulin  UNIT/ML injection    Take 28 units at bedtime. Dose update for next refill.          Estimated Date of Delivery: May 13, 2021    BG/Food Log:     Date Ketones Fasting Post Breakfast Post Lunch Post Supper   4/21 - 93 106 88* 103*   4/22 - 77 82* 111* 107*   4/23 - 114 107 129* 115*   4/24 - 95 80 93* 142/134*   4/25 - 105 91 61/90* 132*   4/26 - 78 107* 125* 113*   4/27 - 100            Assessment:    Ketones: NA.   Fasting blood glucoses: 57% in target.  After breakfast: 100% in target.  After lunch: 66% in target.  After dinner: 66% in target.    Plan/Response:  Recommend increase in insulin - NPH 0-0-0-28 --> 0-0-0-30  Follow-up in 3-4 days.  See [employer] message for patient communications    Karlee Paul RD, LD, [facility]     Any diabetes medication dose changes were made via the CDE Protocol and Collaborative Practice Agreement with the patient's OB/GYN provider. A copy of this encounter was shared with the provider.

## 2021-04-30 ENCOUNTER — HOSPITAL ENCOUNTER (OUTPATIENT)
Dept: ULTRASOUND IMAGING | Facility: CLINIC | Age: 41
Discharge: HOME | End: 2021-04-30
Attending: FAMILY MEDICINE | Admitting: FAMILY MEDICINE
Payer: COMMERCIAL

## 2021-04-30 ENCOUNTER — PRENATAL OFFICE VISIT (OUTPATIENT)
Dept: FAMILY MEDICINE | Facility: CLINIC | Age: 41
End: 2021-04-30
Payer: COMMERCIAL

## 2021-04-30 VITALS
TEMPERATURE: 97.6 F | SYSTOLIC BLOOD PRESSURE: 126 MMHG | DIASTOLIC BLOOD PRESSURE: 64 MMHG | RESPIRATION RATE: 16 BRPM | WEIGHT: 231 LBS | OXYGEN SATURATION: 99 % | BODY MASS INDEX: 37.66 KG/M2 | HEART RATE: 87 BPM

## 2021-04-30 DIAGNOSIS — O24.419 GDM, CLASS A2: ICD-10-CM

## 2021-04-30 DIAGNOSIS — E03.9 HYPOTHYROIDISM, UNSPECIFIED TYPE: ICD-10-CM

## 2021-04-30 DIAGNOSIS — O09.529 SUPERVISION OF HIGH-RISK PREGNANCY OF ELDERLY MULTIGRAVIDA: ICD-10-CM

## 2021-04-30 DIAGNOSIS — O09.529 SUPERVISION OF HIGH-RISK PREGNANCY OF ELDERLY MULTIGRAVIDA: Primary | ICD-10-CM

## 2021-04-30 DIAGNOSIS — O26.899 RH NEGATIVE STATE IN ANTEPARTUM PERIOD: ICD-10-CM

## 2021-04-30 DIAGNOSIS — Z67.91 RH NEGATIVE STATE IN ANTEPARTUM PERIOD: ICD-10-CM

## 2021-04-30 PROCEDURE — 76819 FETAL BIOPHYS PROFIL W/O NST: CPT

## 2021-04-30 PROCEDURE — 99207 PR COMPLICATED OB VISIT: CPT | Performed by: FAMILY MEDICINE

## 2021-05-02 RX ORDER — NALOXONE HYDROCHLORIDE 0.4 MG/ML
0.4 INJECTION, SOLUTION INTRAMUSCULAR; INTRAVENOUS; SUBCUTANEOUS
Status: CANCELLED | OUTPATIENT
Start: 2021-05-02

## 2021-05-02 RX ORDER — OXYTOCIN/0.9 % SODIUM CHLORIDE 30/500 ML
1-24 PLASTIC BAG, INJECTION (ML) INTRAVENOUS CONTINUOUS
Status: CANCELLED | OUTPATIENT
Start: 2021-05-02

## 2021-05-02 RX ORDER — LIDOCAINE 40 MG/G
CREAM TOPICAL
Status: CANCELLED | OUTPATIENT
Start: 2021-05-02

## 2021-05-02 RX ORDER — ONDANSETRON 2 MG/ML
4 INJECTION INTRAMUSCULAR; INTRAVENOUS EVERY 6 HOURS PRN
Status: CANCELLED | OUTPATIENT
Start: 2021-05-02

## 2021-05-02 RX ORDER — ACETAMINOPHEN 325 MG/1
650 TABLET ORAL EVERY 4 HOURS PRN
Status: CANCELLED | OUTPATIENT
Start: 2021-05-02

## 2021-05-02 RX ORDER — NALOXONE HYDROCHLORIDE 0.4 MG/ML
0.2 INJECTION, SOLUTION INTRAMUSCULAR; INTRAVENOUS; SUBCUTANEOUS
Status: CANCELLED | OUTPATIENT
Start: 2021-05-02

## 2021-05-02 RX ORDER — MISOPROSTOL 100 UG/1
25 TABLET ORAL EVERY 4 HOURS PRN
Status: CANCELLED | OUTPATIENT
Start: 2021-05-02

## 2021-05-02 RX ORDER — TRANEXAMIC ACID 10 MG/ML
1 INJECTION, SOLUTION INTRAVENOUS EVERY 30 MIN PRN
Status: CANCELLED | OUTPATIENT
Start: 2021-05-02

## 2021-05-02 RX ORDER — METHYLERGONOVINE MALEATE 0.2 MG/ML
200 INJECTION INTRAVENOUS
Status: CANCELLED | OUTPATIENT
Start: 2021-05-02

## 2021-05-02 RX ORDER — CARBOPROST TROMETHAMINE 250 UG/ML
250 INJECTION, SOLUTION INTRAMUSCULAR
Status: CANCELLED | OUTPATIENT
Start: 2021-05-02

## 2021-05-02 RX ORDER — OXYCODONE AND ACETAMINOPHEN 5; 325 MG/1; MG/1
1 TABLET ORAL
Status: CANCELLED | OUTPATIENT
Start: 2021-05-02

## 2021-05-02 RX ORDER — OXYTOCIN/0.9 % SODIUM CHLORIDE 30/500 ML
100-340 PLASTIC BAG, INJECTION (ML) INTRAVENOUS CONTINUOUS PRN
Status: CANCELLED | OUTPATIENT
Start: 2021-05-02

## 2021-05-02 RX ORDER — DEXTROSE MONOHYDRATE 25 G/50ML
25-50 INJECTION, SOLUTION INTRAVENOUS
Status: CANCELLED | OUTPATIENT
Start: 2021-05-02

## 2021-05-02 RX ORDER — IBUPROFEN 400 MG/1
800 TABLET, FILM COATED ORAL
Status: CANCELLED | OUTPATIENT
Start: 2021-05-02

## 2021-05-02 RX ORDER — FENTANYL CITRATE 50 UG/ML
50-100 INJECTION, SOLUTION INTRAMUSCULAR; INTRAVENOUS
Status: CANCELLED | OUTPATIENT
Start: 2021-05-02

## 2021-05-02 RX ORDER — NICOTINE POLACRILEX 4 MG
15-30 LOZENGE BUCCAL
Status: CANCELLED | OUTPATIENT
Start: 2021-05-02

## 2021-05-02 RX ORDER — OXYTOCIN 10 [USP'U]/ML
10 INJECTION, SOLUTION INTRAMUSCULAR; INTRAVENOUS
Status: CANCELLED | OUTPATIENT
Start: 2021-05-02

## 2021-05-03 ENCOUNTER — HOSPITAL ENCOUNTER (OUTPATIENT)
Dept: ULTRASOUND IMAGING | Facility: CLINIC | Age: 41
Discharge: HOME | End: 2021-05-03
Attending: FAMILY MEDICINE | Admitting: FAMILY MEDICINE
Payer: COMMERCIAL

## 2021-05-03 DIAGNOSIS — O09.529 SUPERVISION OF HIGH-RISK PREGNANCY OF ELDERLY MULTIGRAVIDA: ICD-10-CM

## 2021-05-03 DIAGNOSIS — O24.419 GDM, CLASS A2: ICD-10-CM

## 2021-05-03 PROCEDURE — 76819 FETAL BIOPHYS PROFIL W/O NST: CPT

## 2021-05-03 NOTE — PROGRESS NOTES
Doing well overall.  No bleeding, no regular ctx. Just more yellowish discharge.   Has been increased to 30 units insulin at night. Glucose this am was normal, but previously running high.  On exam, 1 cm at external os, cervix soft, but closed internal os.   Discussed cervical ripening, will have her come in Wednesday evening, anticipate pitocin Thursday.   Discussed induction methods, risks of induction vs natural labor.  Discussed increased risk of , failed induction, prolonged labor, uterine atony, increased risk for bleeding, infection, fetal distress, fetal or maternal injury.  Also discussed alternative of waiting for natural labor, risk of large baby with fetal or maternal injury also.   Discussed when to present for signs and symptoms of labor.   Will present for BPP on Monday as scheduled.   Discussed warning signs for preeclampsia.  Will f/u sooner with any concern for decreased fetal movement, vaginal bleeding, fluid leak, headache, vision change, severe nausea or vomiting, abdominal pain, increased edema or other concerns.   Anyi Holder MD

## 2021-05-04 DIAGNOSIS — Z20.822 COVID-19 RULED OUT: ICD-10-CM

## 2021-05-04 DIAGNOSIS — O03.9 SPONTANEOUS ABORTION: ICD-10-CM

## 2021-05-04 LAB
LABORATORY COMMENT REPORT: NORMAL
SARS-COV-2 RNA RESP QL NAA+PROBE: NEGATIVE
SARS-COV-2 RNA RESP QL NAA+PROBE: NORMAL
SPECIMEN SOURCE: NORMAL
SPECIMEN SOURCE: NORMAL

## 2021-05-04 PROCEDURE — 87635 SARS-COV-2 COVID-19 AMP PRB: CPT | Performed by: FAMILY MEDICINE

## 2021-05-05 ENCOUNTER — HOSPITAL ENCOUNTER (INPATIENT)
Facility: CLINIC | Age: 41
LOS: 4 days | Discharge: HOME HEALTH | End: 2021-05-09
Attending: FAMILY MEDICINE | Admitting: FAMILY MEDICINE
Payer: COMMERCIAL

## 2021-05-05 DIAGNOSIS — O09.529 SUPERVISION OF HIGH-RISK PREGNANCY OF ELDERLY MULTIGRAVIDA: ICD-10-CM

## 2021-05-05 PROBLEM — Z67.91 RH NEGATIVE STATE IN ANTEPARTUM PERIOD: Status: ACTIVE | Noted: 2020-10-20

## 2021-05-05 PROBLEM — E03.9 HYPOTHYROIDISM, UNSPECIFIED TYPE: Status: ACTIVE | Noted: 2020-10-20

## 2021-05-05 PROBLEM — O26.899 RH NEGATIVE STATE IN ANTEPARTUM PERIOD: Status: ACTIVE | Noted: 2020-10-20

## 2021-05-05 LAB
BASOPHILS # BLD AUTO: 0 10E9/L (ref 0–0.2)
BASOPHILS NFR BLD AUTO: 0.2 %
DIFFERENTIAL METHOD BLD: ABNORMAL
EOSINOPHIL # BLD AUTO: 0 10E9/L (ref 0–0.7)
EOSINOPHIL NFR BLD AUTO: 0.5 %
ERYTHROCYTE [DISTWIDTH] IN BLOOD BY AUTOMATED COUNT: 12.5 % (ref 10–15)
GLUCOSE BLDC GLUCOMTR-MCNC: 90 MG/DL (ref 70–99)
HCT VFR BLD AUTO: 39.2 % (ref 35–47)
HGB BLD-MCNC: 13.5 G/DL (ref 11.7–15.7)
IMM GRANULOCYTES # BLD: 0 10E9/L (ref 0–0.4)
IMM GRANULOCYTES NFR BLD: 0.3 %
LYMPHOCYTES # BLD AUTO: 1.9 10E9/L (ref 0.8–5.3)
LYMPHOCYTES NFR BLD AUTO: 22.3 %
MCH RBC QN AUTO: 33.3 PG (ref 26.5–33)
MCHC RBC AUTO-ENTMCNC: 34.4 G/DL (ref 31.5–36.5)
MCV RBC AUTO: 97 FL (ref 78–100)
MONOCYTES # BLD AUTO: 0.5 10E9/L (ref 0–1.3)
MONOCYTES NFR BLD AUTO: 5.9 %
NEUTROPHILS # BLD AUTO: 6.1 10E9/L (ref 1.6–8.3)
NEUTROPHILS NFR BLD AUTO: 70.8 %
NRBC # BLD AUTO: 0 10*3/UL
NRBC BLD AUTO-RTO: 0 /100
PLATELET # BLD AUTO: 257 10E9/L (ref 150–450)
RBC # BLD AUTO: 4.05 10E12/L (ref 3.8–5.2)
WBC # BLD AUTO: 8.6 10E9/L (ref 4–11)

## 2021-05-05 PROCEDURE — 250N000009 HC RX 250

## 2021-05-05 PROCEDURE — 86901 BLOOD TYPING SEROLOGIC RH(D): CPT | Performed by: FAMILY MEDICINE

## 2021-05-05 PROCEDURE — 86900 BLOOD TYPING SEROLOGIC ABO: CPT | Performed by: FAMILY MEDICINE

## 2021-05-05 PROCEDURE — 999N001017 HC STATISTIC GLUCOSE BY METER IP

## 2021-05-05 PROCEDURE — 120N000001 HC R&B MED SURG/OB

## 2021-05-05 PROCEDURE — 250N000013 HC RX MED GY IP 250 OP 250 PS 637: Performed by: FAMILY MEDICINE

## 2021-05-05 PROCEDURE — 86850 RBC ANTIBODY SCREEN: CPT | Performed by: FAMILY MEDICINE

## 2021-05-05 PROCEDURE — 85025 COMPLETE CBC W/AUTO DIFF WBC: CPT | Performed by: FAMILY MEDICINE

## 2021-05-05 PROCEDURE — 86780 TREPONEMA PALLIDUM: CPT | Performed by: FAMILY MEDICINE

## 2021-05-05 PROCEDURE — 3E0P7VZ INTRODUCTION OF HORMONE INTO FEMALE REPRODUCTIVE, VIA NATURAL OR ARTIFICIAL OPENING: ICD-10-PCS | Performed by: FAMILY MEDICINE

## 2021-05-05 PROCEDURE — 86870 RBC ANTIBODY IDENTIFICATION: CPT | Performed by: FAMILY MEDICINE

## 2021-05-05 RX ORDER — MISOPROSTOL 100 UG/1
25 TABLET ORAL EVERY 4 HOURS PRN
Status: DISCONTINUED | OUTPATIENT
Start: 2021-05-05 | End: 2021-05-06

## 2021-05-05 RX ORDER — NICOTINE POLACRILEX 4 MG
15-30 LOZENGE BUCCAL
Status: DISCONTINUED | OUTPATIENT
Start: 2021-05-05 | End: 2021-05-06

## 2021-05-05 RX ORDER — LIDOCAINE HYDROCHLORIDE 10 MG/ML
INJECTION, SOLUTION EPIDURAL; INFILTRATION; INTRACAUDAL; PERINEURAL
Status: COMPLETED
Start: 2021-05-05 | End: 2021-05-05

## 2021-05-05 RX ORDER — DEXTROSE MONOHYDRATE 25 G/50ML
25-50 INJECTION, SOLUTION INTRAVENOUS
Status: DISCONTINUED | OUTPATIENT
Start: 2021-05-05 | End: 2021-05-06

## 2021-05-05 RX ADMIN — Medication 25 MCG: at 20:51

## 2021-05-05 RX ADMIN — LIDOCAINE HYDROCHLORIDE 0.1 MG: 10 INJECTION, SOLUTION EPIDURAL; INFILTRATION; INTRACAUDAL; PERINEURAL at 21:00

## 2021-05-06 ENCOUNTER — ANESTHESIA (OUTPATIENT)
Dept: OBGYN | Facility: CLINIC | Age: 41
End: 2021-05-06
Payer: COMMERCIAL

## 2021-05-06 ENCOUNTER — ANESTHESIA EVENT (OUTPATIENT)
Dept: OBGYN | Facility: CLINIC | Age: 41
End: 2021-05-06
Payer: COMMERCIAL

## 2021-05-06 LAB
ABO + RH BLD: ABNORMAL
ABO + RH BLD: ABNORMAL
BLD GP AB INVEST PLASRBC-IMP: ABNORMAL
BLD GP AB SCN SERPL QL: ABNORMAL
BLOOD BANK CMNT PATIENT-IMP: ABNORMAL
BLOOD BANK CMNT PATIENT-IMP: ABNORMAL
BLOOD BANK CMNT PATIENT-IMP: NORMAL
GLUCOSE BLDC GLUCOMTR-MCNC: 104 MG/DL (ref 70–99)
GLUCOSE BLDC GLUCOMTR-MCNC: 114 MG/DL (ref 70–99)
GLUCOSE BLDC GLUCOMTR-MCNC: 58 MG/DL (ref 70–99)
GLUCOSE BLDC GLUCOMTR-MCNC: 69 MG/DL (ref 70–99)
GLUCOSE BLDC GLUCOMTR-MCNC: 73 MG/DL (ref 70–99)
GLUCOSE BLDC GLUCOMTR-MCNC: 75 MG/DL (ref 70–99)
GLUCOSE BLDC GLUCOMTR-MCNC: 78 MG/DL (ref 70–99)
GLUCOSE BLDC GLUCOMTR-MCNC: 83 MG/DL (ref 70–99)
GLUCOSE BLDC GLUCOMTR-MCNC: 89 MG/DL (ref 70–99)
GLUCOSE BLDC GLUCOMTR-MCNC: 92 MG/DL (ref 70–99)
GLUCOSE BLDC GLUCOMTR-MCNC: 92 MG/DL (ref 70–99)
GLUCOSE BLDC GLUCOMTR-MCNC: 96 MG/DL (ref 70–99)
GLUCOSE BLDC GLUCOMTR-MCNC: 99 MG/DL (ref 70–99)
SPECIMEN EXP DATE BLD: ABNORMAL
T PALLIDUM AB SER QL: NONREACTIVE

## 2021-05-06 PROCEDURE — 250N000011 HC RX IP 250 OP 636: Performed by: FAMILY MEDICINE

## 2021-05-06 PROCEDURE — 250N000009 HC RX 250: Performed by: NURSE ANESTHETIST, CERTIFIED REGISTERED

## 2021-05-06 PROCEDURE — 258N000003 HC RX IP 258 OP 636: Performed by: FAMILY MEDICINE

## 2021-05-06 PROCEDURE — 0KQM0ZZ REPAIR PERINEUM MUSCLE, OPEN APPROACH: ICD-10-PCS | Performed by: FAMILY MEDICINE

## 2021-05-06 PROCEDURE — 59400 OBSTETRICAL CARE: CPT | Performed by: FAMILY MEDICINE

## 2021-05-06 PROCEDURE — 10907ZC DRAINAGE OF AMNIOTIC FLUID, THERAPEUTIC FROM PRODUCTS OF CONCEPTION, VIA NATURAL OR ARTIFICIAL OPENING: ICD-10-PCS | Performed by: FAMILY MEDICINE

## 2021-05-06 PROCEDURE — 999N001017 HC STATISTIC GLUCOSE BY METER IP

## 2021-05-06 PROCEDURE — 120N000001 HC R&B MED SURG/OB

## 2021-05-06 PROCEDURE — 00HU33Z INSERTION OF INFUSION DEVICE INTO SPINAL CANAL, PERCUTANEOUS APPROACH: ICD-10-PCS | Performed by: FAMILY MEDICINE

## 2021-05-06 PROCEDURE — 3E0R3BZ INTRODUCTION OF ANESTHETIC AGENT INTO SPINAL CANAL, PERCUTANEOUS APPROACH: ICD-10-PCS | Performed by: FAMILY MEDICINE

## 2021-05-06 PROCEDURE — 250N000009 HC RX 250: Performed by: FAMILY MEDICINE

## 2021-05-06 PROCEDURE — 250N000012 HC RX MED GY IP 250 OP 636 PS 637: Performed by: FAMILY MEDICINE

## 2021-05-06 PROCEDURE — 370N000003 HC ANESTHESIA WARD SERVICE

## 2021-05-06 PROCEDURE — 250N000011 HC RX IP 250 OP 636: Performed by: NURSE ANESTHETIST, CERTIFIED REGISTERED

## 2021-05-06 PROCEDURE — 250N000013 HC RX MED GY IP 250 OP 250 PS 637: Performed by: FAMILY MEDICINE

## 2021-05-06 PROCEDURE — 3E033VJ INTRODUCTION OF OTHER HORMONE INTO PERIPHERAL VEIN, PERCUTANEOUS APPROACH: ICD-10-PCS | Performed by: FAMILY MEDICINE

## 2021-05-06 PROCEDURE — 722N000001 HC LABOR CARE VAGINAL DELIVERY SINGLE

## 2021-05-06 RX ORDER — NALOXONE HYDROCHLORIDE 0.4 MG/ML
0.4 INJECTION, SOLUTION INTRAMUSCULAR; INTRAVENOUS; SUBCUTANEOUS
Status: DISCONTINUED | OUTPATIENT
Start: 2021-05-06 | End: 2021-05-06

## 2021-05-06 RX ORDER — METHYLERGONOVINE MALEATE 0.2 MG/ML
200 INJECTION INTRAVENOUS
Status: DISCONTINUED | OUTPATIENT
Start: 2021-05-06 | End: 2021-05-06

## 2021-05-06 RX ORDER — ACETAMINOPHEN 325 MG/1
650 TABLET ORAL EVERY 4 HOURS PRN
Status: DISCONTINUED | OUTPATIENT
Start: 2021-05-06 | End: 2021-05-06

## 2021-05-06 RX ORDER — FENTANYL CITRATE 50 UG/ML
INJECTION, SOLUTION INTRAMUSCULAR; INTRAVENOUS PRN
Status: DISCONTINUED | OUTPATIENT
Start: 2021-05-06 | End: 2021-05-06

## 2021-05-06 RX ORDER — NALBUPHINE HYDROCHLORIDE 10 MG/ML
2.5-5 INJECTION, SOLUTION INTRAMUSCULAR; INTRAVENOUS; SUBCUTANEOUS EVERY 6 HOURS PRN
Status: DISCONTINUED | OUTPATIENT
Start: 2021-05-06 | End: 2021-05-06

## 2021-05-06 RX ORDER — MODIFIED LANOLIN
OINTMENT (GRAM) TOPICAL
Status: DISCONTINUED | OUTPATIENT
Start: 2021-05-06 | End: 2021-05-09 | Stop reason: HOSPADM

## 2021-05-06 RX ORDER — DEXTROSE MONOHYDRATE 25 G/50ML
25-50 INJECTION, SOLUTION INTRAVENOUS
Status: DISCONTINUED | OUTPATIENT
Start: 2021-05-06 | End: 2021-05-06

## 2021-05-06 RX ORDER — ONDANSETRON 2 MG/ML
4 INJECTION INTRAMUSCULAR; INTRAVENOUS EVERY 6 HOURS PRN
Status: DISCONTINUED | OUTPATIENT
Start: 2021-05-06 | End: 2021-05-06

## 2021-05-06 RX ORDER — AMOXICILLIN 250 MG
2 CAPSULE ORAL 2 TIMES DAILY
Status: DISCONTINUED | OUTPATIENT
Start: 2021-05-06 | End: 2021-05-09 | Stop reason: HOSPADM

## 2021-05-06 RX ORDER — LEVOTHYROXINE SODIUM 50 UG/1
100 TABLET ORAL DAILY
Status: DISCONTINUED | OUTPATIENT
Start: 2021-05-07 | End: 2021-05-09 | Stop reason: HOSPADM

## 2021-05-06 RX ORDER — ACETAMINOPHEN 325 MG/1
650 TABLET ORAL EVERY 4 HOURS PRN
Status: DISCONTINUED | OUTPATIENT
Start: 2021-05-06 | End: 2021-05-09 | Stop reason: HOSPADM

## 2021-05-06 RX ORDER — LIDOCAINE 40 MG/G
CREAM TOPICAL
Status: DISCONTINUED | OUTPATIENT
Start: 2021-05-06 | End: 2021-05-06

## 2021-05-06 RX ORDER — TRANEXAMIC ACID 10 MG/ML
1 INJECTION, SOLUTION INTRAVENOUS EVERY 30 MIN PRN
Status: DISCONTINUED | OUTPATIENT
Start: 2021-05-06 | End: 2021-05-09 | Stop reason: HOSPADM

## 2021-05-06 RX ORDER — NICOTINE POLACRILEX 4 MG
15-30 LOZENGE BUCCAL
Status: DISCONTINUED | OUTPATIENT
Start: 2021-05-06 | End: 2021-05-09 | Stop reason: HOSPADM

## 2021-05-06 RX ORDER — LIDOCAINE HYDROCHLORIDE AND EPINEPHRINE 15; 5 MG/ML; UG/ML
INJECTION, SOLUTION EPIDURAL PRN
Status: DISCONTINUED | OUTPATIENT
Start: 2021-05-06 | End: 2021-05-06

## 2021-05-06 RX ORDER — FENTANYL CITRATE 50 UG/ML
50-100 INJECTION, SOLUTION INTRAMUSCULAR; INTRAVENOUS
Status: DISCONTINUED | OUTPATIENT
Start: 2021-05-06 | End: 2021-05-06

## 2021-05-06 RX ORDER — OXYTOCIN 10 [USP'U]/ML
10 INJECTION, SOLUTION INTRAMUSCULAR; INTRAVENOUS
Status: DISCONTINUED | OUTPATIENT
Start: 2021-05-06 | End: 2021-05-06

## 2021-05-06 RX ORDER — CARBOPROST TROMETHAMINE 250 UG/ML
250 INJECTION, SOLUTION INTRAMUSCULAR
Status: DISCONTINUED | OUTPATIENT
Start: 2021-05-06 | End: 2021-05-06

## 2021-05-06 RX ORDER — OXYTOCIN/0.9 % SODIUM CHLORIDE 30/500 ML
100-340 PLASTIC BAG, INJECTION (ML) INTRAVENOUS CONTINUOUS PRN
Status: COMPLETED | OUTPATIENT
Start: 2021-05-06 | End: 2021-05-06

## 2021-05-06 RX ORDER — TRANEXAMIC ACID 10 MG/ML
1 INJECTION, SOLUTION INTRAVENOUS EVERY 30 MIN PRN
Status: DISCONTINUED | OUTPATIENT
Start: 2021-05-06 | End: 2021-05-06

## 2021-05-06 RX ORDER — HYDROXYZINE HYDROCHLORIDE 50 MG/1
100 TABLET, FILM COATED ORAL ONCE
Status: COMPLETED | OUTPATIENT
Start: 2021-05-06 | End: 2021-05-06

## 2021-05-06 RX ORDER — NALOXONE HYDROCHLORIDE 0.4 MG/ML
0.2 INJECTION, SOLUTION INTRAMUSCULAR; INTRAVENOUS; SUBCUTANEOUS
Status: DISCONTINUED | OUTPATIENT
Start: 2021-05-06 | End: 2021-05-06

## 2021-05-06 RX ORDER — NICOTINE POLACRILEX 4 MG
15-30 LOZENGE BUCCAL
Status: DISCONTINUED | OUTPATIENT
Start: 2021-05-06 | End: 2021-05-06

## 2021-05-06 RX ORDER — BISACODYL 10 MG
10 SUPPOSITORY, RECTAL RECTAL DAILY PRN
Status: DISCONTINUED | OUTPATIENT
Start: 2021-05-08 | End: 2021-05-09 | Stop reason: HOSPADM

## 2021-05-06 RX ORDER — OXYTOCIN/0.9 % SODIUM CHLORIDE 30/500 ML
1-24 PLASTIC BAG, INJECTION (ML) INTRAVENOUS CONTINUOUS
Status: DISCONTINUED | OUTPATIENT
Start: 2021-05-06 | End: 2021-05-06

## 2021-05-06 RX ORDER — DEXTROSE MONOHYDRATE 25 G/50ML
25-50 INJECTION, SOLUTION INTRAVENOUS
Status: DISCONTINUED | OUTPATIENT
Start: 2021-05-06 | End: 2021-05-09 | Stop reason: HOSPADM

## 2021-05-06 RX ORDER — OXYTOCIN/0.9 % SODIUM CHLORIDE 30/500 ML
340 PLASTIC BAG, INJECTION (ML) INTRAVENOUS CONTINUOUS PRN
Status: DISCONTINUED | OUTPATIENT
Start: 2021-05-06 | End: 2021-05-09 | Stop reason: HOSPADM

## 2021-05-06 RX ORDER — OXYTOCIN 10 [USP'U]/ML
10 INJECTION, SOLUTION INTRAMUSCULAR; INTRAVENOUS
Status: DISCONTINUED | OUTPATIENT
Start: 2021-05-06 | End: 2021-05-09 | Stop reason: HOSPADM

## 2021-05-06 RX ORDER — DEXTROSE, SODIUM CHLORIDE, SODIUM LACTATE, POTASSIUM CHLORIDE, AND CALCIUM CHLORIDE 5; .6; .31; .03; .02 G/100ML; G/100ML; G/100ML; G/100ML; G/100ML
INJECTION, SOLUTION INTRAVENOUS CONTINUOUS PRN
Status: DISCONTINUED | OUTPATIENT
Start: 2021-05-06 | End: 2021-05-06

## 2021-05-06 RX ORDER — SODIUM CHLORIDE 9 MG/ML
INJECTION, SOLUTION INTRAVENOUS CONTINUOUS
Status: DISCONTINUED | OUTPATIENT
Start: 2021-05-06 | End: 2021-05-06

## 2021-05-06 RX ORDER — OXYTOCIN/0.9 % SODIUM CHLORIDE 30/500 ML
100 PLASTIC BAG, INJECTION (ML) INTRAVENOUS CONTINUOUS
Status: DISCONTINUED | OUTPATIENT
Start: 2021-05-06 | End: 2021-05-09 | Stop reason: HOSPADM

## 2021-05-06 RX ORDER — AMOXICILLIN 250 MG
1 CAPSULE ORAL 2 TIMES DAILY
Status: DISCONTINUED | OUTPATIENT
Start: 2021-05-06 | End: 2021-05-09 | Stop reason: HOSPADM

## 2021-05-06 RX ORDER — OXYCODONE AND ACETAMINOPHEN 5; 325 MG/1; MG/1
1 TABLET ORAL
Status: DISCONTINUED | OUTPATIENT
Start: 2021-05-06 | End: 2021-05-06

## 2021-05-06 RX ORDER — IBUPROFEN 800 MG/1
800 TABLET, FILM COATED ORAL
Status: DISCONTINUED | OUTPATIENT
Start: 2021-05-06 | End: 2021-05-06

## 2021-05-06 RX ORDER — PRENATAL VIT/IRON FUM/FOLIC AC 27MG-0.8MG
1 TABLET ORAL DAILY
Status: DISCONTINUED | OUTPATIENT
Start: 2021-05-07 | End: 2021-05-09 | Stop reason: HOSPADM

## 2021-05-06 RX ORDER — IBUPROFEN 800 MG/1
800 TABLET, FILM COATED ORAL EVERY 6 HOURS PRN
Status: DISCONTINUED | OUTPATIENT
Start: 2021-05-06 | End: 2021-05-09 | Stop reason: HOSPADM

## 2021-05-06 RX ORDER — EPHEDRINE SULFATE 50 MG/ML
5 INJECTION, SOLUTION INTRAMUSCULAR; INTRAVENOUS; SUBCUTANEOUS
Status: DISCONTINUED | OUTPATIENT
Start: 2021-05-06 | End: 2021-05-06

## 2021-05-06 RX ADMIN — Medication 340 ML/HR: at 22:08

## 2021-05-06 RX ADMIN — Medication 1 MILLI-UNITS/MIN: at 06:06

## 2021-05-06 RX ADMIN — Medication 1.75 ML: at 20:35

## 2021-05-06 RX ADMIN — FENTANYL CITRATE 25 MCG: 50 INJECTION, SOLUTION INTRAMUSCULAR; INTRAVENOUS at 20:35

## 2021-05-06 RX ADMIN — FENTANYL CITRATE 25 MCG: 50 INJECTION, SOLUTION INTRAMUSCULAR; INTRAVENOUS at 19:01

## 2021-05-06 RX ADMIN — INSULIN HUMAN 30 UNITS: 100 INJECTION, SUSPENSION SUBCUTANEOUS at 01:17

## 2021-05-06 RX ADMIN — FENTANYL CITRATE 100 MCG: 50 INJECTION, SOLUTION INTRAMUSCULAR; INTRAVENOUS at 11:26

## 2021-05-06 RX ADMIN — FENTANYL CITRATE 25 MCG: 50 INJECTION, SOLUTION INTRAMUSCULAR; INTRAVENOUS at 14:04

## 2021-05-06 RX ADMIN — Medication 1.75 ML: at 19:01

## 2021-05-06 RX ADMIN — FENTANYL CITRATE 100 MCG: 50 INJECTION, SOLUTION INTRAMUSCULAR; INTRAVENOUS at 18:22

## 2021-05-06 RX ADMIN — SODIUM CHLORIDE, POTASSIUM CHLORIDE, SODIUM LACTATE AND CALCIUM CHLORIDE 1000 ML: 600; 310; 30; 20 INJECTION, SOLUTION INTRAVENOUS at 11:25

## 2021-05-06 RX ADMIN — HYDROXYZINE HYDROCHLORIDE 100 MG: 50 TABLET, FILM COATED ORAL at 06:06

## 2021-05-06 RX ADMIN — Medication 25 MCG: at 01:05

## 2021-05-06 RX ADMIN — LIDOCAINE HYDROCHLORIDE,EPINEPHRINE BITARTRATE 1.5 ML: 15; .005 INJECTION, SOLUTION EPIDURAL; INFILTRATION; INTRACAUDAL; PERINEURAL at 12:55

## 2021-05-06 RX ADMIN — Medication 1.75 ML: at 14:04

## 2021-05-06 RX ADMIN — FENTANYL CITRATE 100 MCG: 50 INJECTION, SOLUTION INTRAMUSCULAR; INTRAVENOUS at 10:31

## 2021-05-06 SDOH — ECONOMIC STABILITY: FOOD INSECURITY: WITHIN THE PAST 12 MONTHS, THE FOOD YOU BOUGHT JUST DIDN'T LAST AND YOU DIDN'T HAVE MONEY TO GET MORE.: NOT ASKED

## 2021-05-06 SDOH — ECONOMIC STABILITY: FOOD INSECURITY: WITHIN THE PAST 12 MONTHS, YOU WORRIED THAT YOUR FOOD WOULD RUN OUT BEFORE YOU GOT MONEY TO BUY MORE.: NOT ASKED

## 2021-05-06 SDOH — ECONOMIC STABILITY: TRANSPORTATION INSECURITY
IN THE PAST 12 MONTHS, HAS LACK OF TRANSPORTATION KEPT YOU FROM MEETINGS, WORK, OR FROM GETTING THINGS NEEDED FOR DAILY LIVING?: NOT ASKED

## 2021-05-06 SDOH — ECONOMIC STABILITY: INCOME INSECURITY: HOW HARD IS IT FOR YOU TO PAY FOR THE VERY BASICS LIKE FOOD, HOUSING, MEDICAL CARE, AND HEATING?: NOT ASKED

## 2021-05-06 SDOH — ECONOMIC STABILITY: TRANSPORTATION INSECURITY
IN THE PAST 12 MONTHS, HAS THE LACK OF TRANSPORTATION KEPT YOU FROM MEDICAL APPOINTMENTS OR FROM GETTING MEDICATIONS?: NOT ASKED

## 2021-05-06 NOTE — ANESTHESIA PROCEDURE NOTES
Epidural catheter Procedure Note  Pre-Procedure   Staff -        Resident/Fellow: Brian Adams       CRNA: Génesis Thorne APRN CRNA       Performed By: CRNA and SRNA       Location: OB       Pre-Anesthestic Checklist: patient identified, IV checked, risks and benefits discussed, informed consent, monitors and equipment checked, pre-op evaluation and at physician/surgeon's request  Timeout:       Correct Patient: Yes        Correct Procedure: Yes        Correct Site: Yes        Correct Position: Yes   Procedure Documentation  Procedure: epidural catheter       Patient Position: sitting       Patient Prep/Sterile Barriers: sterile gloves, mask, patient draped       Skin prep: Betadine       Local skin infiltrated with 3 mL of 1% lidocaine.        Insertion Site: L3-4. (midline approach).       Technique: LORT air        Needle Type: J Luis needle and Ramirez       Needle Gauge: 18.        Needle Length (Inches): 3.5        Catheter: 20 G.         Catheter threaded easily.         4 cm epidural space.        # of attempts: 2 and  # of redirects:     Assessment/Narrative         Paresthesias: No.      Test dose of 3 mL lidocaine 1.5% w/ 1:200,000 epinephrine at.         Test dose negative, 3 minutes after injection, for signs of intravascular, subdural, or intrathecal injection.       Insertion/Infusion Method: LORT air       Aspiration negative for Heme or CSF via Epidural Catheter.       Sensory Level Left: T10.       Sensory Level Right: T10.    Comments:  Under the direct supervision of Génesis Thorne CRNA, 1 attempt per Brian TOLEDO at L3-L4 for positive clear fluid.  20 CC preservative free NSS placed in the intrathecal space and the epidural needle was withdrawn.  1 attempt per Génesis Thorne CRNA at L2-L3 without difficulty.  Cath threaded 11 cm at the skin.  Small amount of Clear fluid 1.5 ml withdrawn from the epidural cath.  Test dose of 1.5%lido with epinephrine given via the cath,  patients pain relieved and her  legs felt numb.  Taped cath in place.  Patient T-10 bilaterally.  Will cap the epidural cath and re dose as needed as patient becomes uncomfortable.  Discussed this with the patient and Mara YO.  At this time we will re-dose the cath as an intrathecal cath.    Will follow as necessary.

## 2021-05-06 NOTE — PROGRESS NOTES
Pt up to bathroom. Normal sensation in legs. Able to ambulate. Pain 0/10. Pt not steve for previous 20 minutes. Pitocin up to 10mu/min. FHTs category 1. Ho bulb pulled with gentle traction and came out. Cvx 3-4/60%/-4. VSS. Will continue to increase pitocin. BG 92.

## 2021-05-06 NOTE — H&P
"  May 6, 2021    Estefania Arguello  [id number]        OB Admit History & Physical      Ms. Arguello  is here for induction of labor.    Patient's last menstrual period was 2020 (exact date).   Her Estimated Date of Delivery: May 13, 2021, making her 39w0d  wks.      Estimated body mass index is 37.66 kg/m  as calculated from the following:    Height as of 20: 1.668 m (5' 5.67\").    Weight as of 21: 104.8 kg (231 lb).  Her prenatal course has been complicated by AMA as well as GDM A2 diagnosed at 24 weeks, on insulin since 28 weeks and well controlled although slowly increasing her insulin dose.     See prenatal for labs.  negative GBBS, Rubella Immune, RH negative, treated with rhogam. She also has treated hypothyroidism with normal TSH level on medication.     Estimated fetal weight= 7 lbs 12 oz     She is a 40 year old   Her OB history:   OB History    Para Term  AB Living   2 0 0 0 1 0   SAB TAB Ectopic Multiple Live Births   0 0 0 0 0      # Outcome Date GA Lbr Evens/2nd Weight Sex Delivery Anes PTL Lv   2 Current            1 AB 2020 7w0d             Obstetric Comments   EDC 2021  based on LMP.  Parenting with Marcelo.            Past Medical History:   Diagnosis Date     Back pain      Hypothyroidism      Seasonal allergies           Past Surgical History:   Procedure Laterality Date     EYE SURGERY      \"benign tumor above right eye\"     MOUTH SURGERY      \"benign tumor under molar removed\"         No current outpatient medications on file.       Allergies: Patient has no known allergies.      REVIEW OF SYSTEMS:  NEUROLOGIC:  Negative  EYES:  Negative  ENT:  Negative  GI:  Negative  BREAST:  Negative  :  Negative  GYN:  Negative  CV:  Negative  PULMONARY:  Negative  MUSCULOSKELETAL:  Negative  PSYCH:  Negative        Social History     Socioeconomic History     Marital status: Single     Spouse name: Marcelo     Number of children: 0     Years of education: Not on file     " Highest education level: Not on file   Occupational History     Occupation: PCA, Homemaking, [employer] for disabled clients   Social Needs     Financial resource strain: Not on file     Food insecurity     Worry: Not on file     Inability: Not on file     Transportation needs     Medical: Not on file     Non-medical: Not on file   Tobacco Use     Smoking status: Former Smoker     Quit date: 2018     Years since quittin.8     Smokeless tobacco: Former User   Substance and Sexual Activity     Alcohol use: Not Currently     Drug use: Never     Sexual activity: Yes     Partners: Male   Lifestyle     Physical activity     Days per week: Not on file     Minutes per session: Not on file     Stress: Not on file   Relationships     Social connections     Talks on phone: Not on file     Gets together: Not on file     Attends Gnosticism service: Not on file     Active member of club or organization: Not on file     Attends meetings of clubs or organizations: Not on file     Relationship status: Not on file     Intimate partner violence     Fear of current or ex partner: Not on file     Emotionally abused: Not on file     Physically abused: Not on file     Forced sexual activity: Not on file   Other Topics Concern     Not on file   Social History Narrative    2020  Lives in [place] with Marcelo JEFFERS.  Marcelo smokes.  Discussed secondhand smoke risks in pregnancy.  No indoor cats/kittens.  No concerns about domestic violence.  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        Family History   Problem Relation Age of Onset     Thyroid Disease Mother      Other - See Comments Mother         B12 deficiancy     Hyperlipidemia Mother      No Known Problems Father      No Known Problems Sister      No Known Problems Brother      Skin Cancer Maternal Grandmother      Cancer Maternal Grandmother         skin     Thyroid Disease Maternal Grandmother      Emphysema Maternal Grandfather      Hypertension Paternal Grandmother       Thyroid Disease Paternal Grandmother      Emphysema Paternal Grandfather      Cancer Maternal Aunt         stomach     Cancer Maternal Uncle         stomach     ALS Paternal Uncle      Lymphoma Paternal Aunt         intravascular       Vitals:   FHT 130s with good variability, cat 1 tracing. She received 2 doses of cytotec overnight and started steve after the second dose, around 1:30 am. By 5:30 she was steve too much to have a 3rd dose so she was started on pitocin early morning, around 6 am. She is having contractions every 2-3 min and starting to feel them.     Alert Awake in NAD  HEENT grossly normal  Neck: no lymphadenopathy or thryoidomegaly  Lungs clear  Back no spinal or CVAT  Heart RRR without murmur  ABD gravid, cephalic on exam   Pelvic:  no fluid noted, small amount of bloody mucus noted  Cervix is 1 cm / 50 % effaced at -3 station  EXT:  no edema or calf tenderness  Neuro:  Intact    I was able to strip her membranes and she started feeling increased pain very soon afterward, breathing through her ctx.   We discussed balloon placement and she agreed. We got everything set up and when I started to place the balloon she was already 2 cm dilated. Labor much more intense currently. I was able to successfully place the llamas intracervical balloon catheter and inflate balloon to 75 ml.     Assessment:  IUP at 39w0d  With induction of labor due to GDM and AMA.     Plan:  Continue active labor.   Will change diabetes management to intrapartum active labor order set. So far glucoses have been well controlled. She is considering her pain treatment options. She wants to hold off epidural as long as possible but starting to need something. Offering fentanyl, considering. Continue pitocin.       Anyi Holder MD   Dept of Family Medicine  May 6, 2021

## 2021-05-06 NOTE — PROGRESS NOTES
Doing well per RN, resting.   Llamas still in place.     Vitals:    05/06/21 1425 05/06/21 1430 05/06/21 1435 05/06/21 1440   BP: 92/51 101/56  (P) 133/71   Pulse:    (P) 67   Resp:    (P) 16   Temp:       TempSrc:       SpO2: 92% 96% 94% 95%      Recent Labs   Lab 05/06/21  1431 05/06/21  1320 05/06/21  1200 05/06/21  1053 05/06/21  0827 05/06/21  0513   BGM 69* 92 75 89 96 104*     Given a fruit ice.    FHT still reassuring, cat 1. Baseline 125, moderate variability. Ctx still q 3 minutes.   Not reexamined per RN.    A:  Term SIUP, AMA, GDM A2, doing well.   P:  Continue pitocin, anticipate llamas balloon falling out soon as cervix further dilates. Will increase pitocin as tolerated. Then will consider IUPC.     Anyi Holder MD

## 2021-05-06 NOTE — PROGRESS NOTES
Dr. Holder notified of cervical check and pts response to pain.  Orders received to start pitocin and offer pt Vistaril to relax  Orders repeated back to MD.

## 2021-05-06 NOTE — PROGRESS NOTES
"Doing \"too well\". Intensity of contractions has decreased. Her ITN wore off and she is up walking around. Her balloon fell out around 4 pm and at that time RN checked her and she was 3-4 cm, 60+% effaced.     Vitals:    05/06/21 1515 05/06/21 1545 05/06/21 1600 05/06/21 1630   BP: 116/59 104/67  113/72   Pulse:    70   Resp:    16   Temp:    97.9  F (36.6  C)   TempSrc:    Oral   SpO2: 97%  97%       FHT still reactive, 130s with good variability.  CTx q 2-3 minutes. Varying intensity, monitoring externally.     Recent Labs   Lab 05/06/21  1756 05/06/21  1611 05/06/21  1431 05/06/21  1320 05/06/21  1200 05/06/21  1053   BGM 78 92 69* 92 75 89       A: term SIUP, induction due to AMA, GDM A2.   Doing well but decreased intensity of labor.     P: increase pitocin, will recheck her in 30-45 minutes (anesthesia not available immediately at this time) and consider IUPC/AROM as able.   Continue close monitoring of glucose, fetal status. At this time both are doing well.     Anyi Holder MD   "

## 2021-05-06 NOTE — PLAN OF CARE
S: Induction of labor   B: Patient is a  who presented for scheduled induction of labor @ 38w 6d gestation tonight at 1930  A:Cytotec for 25 dose was given vaginally, cervix 1cm/30%/-3 with no contractions on monitor or palpated.  Pt rates her pain at a 0/10. Understands that she will be on continuous EFM throughout induction. FHR baseline is 120 with mod variability. Patient agrees with plan of care.   R: Will observe for active phase of labor and fetal tolerance.

## 2021-05-06 NOTE — PROGRESS NOTES
"[facility] called at 1350 for bolus. Client rating pain a \"3\", but feels it will be increasing. Dermatomes at pubis bilaterally.   "

## 2021-05-06 NOTE — ANESTHESIA PREPROCEDURE EVALUATION
"Anesthesia Pre-Procedure Evaluation    Patient: Estefania Arguello   MRN: [id number] : 1980        Preoperative Diagnosis: * No surgery found *   Procedure :      Past Medical History:   Diagnosis Date     Back pain      Hypothyroidism      Seasonal allergies       Past Surgical History:   Procedure Laterality Date     EYE SURGERY      \"benign tumor above right eye\"     MOUTH SURGERY      \"benign tumor under molar removed\"      No Known Allergies   Social History     Tobacco Use     Smoking status: Former Smoker     Quit date: 2018     Years since quittin.8     Smokeless tobacco: Former User   Substance Use Topics     Alcohol use: Not Currently      Wt Readings from Last 1 Encounters:   21 104.8 kg (231 lb)        Anesthesia Evaluation   Pt has had prior anesthetic. Type: MAC.        ROS/MED HX  ENT/Pulmonary:  - neg pulmonary ROS     Neurologic:  - neg neurologic ROS     Cardiovascular:  - neg cardiovascular ROS     METS/Exercise Tolerance:     Hematologic:  - neg hematologic  ROS     Musculoskeletal:       GI/Hepatic:  - neg GI/hepatic ROS     Renal/Genitourinary:  - neg Renal ROS     Endo:     (+) thyroid problem, hypothyroidism, gestational diabetes, (-) Type I DM   Psychiatric/Substance Use:  - neg psychiatric ROS     Infectious Disease:  - neg infectious disease ROS     Malignancy:  - neg malignancy ROS     Other:            Physical Exam    Airway        Mallampati: II   TM distance: > 3 FB   Neck ROM: full   Mouth opening: > 3 cm    Respiratory Devices and Support         Dental  no notable dental history         Cardiovascular   cardiovascular exam normal          Pulmonary   pulmonary exam normal                OUTSIDE LABS:  CBC:   Lab Results   Component Value Date    WBC 8.6 2021    WBC 6.7 2021    HGB 13.5 2021    HGB 12.7 2021    HCT 39.2 2021    HCT 37.7 2021     2021     2021     BMP:   Lab Results   Component Value " Date     04/02/2021     01/22/2021    POTASSIUM 4.1 04/02/2021    POTASSIUM 3.6 01/22/2021    CHLORIDE 105 04/02/2021    CHLORIDE 105 01/22/2021    CO2 26 04/02/2021    CO2 26 01/22/2021    BUN 13 04/02/2021    BUN 10 01/22/2021    CR 0.54 04/02/2021    CR 0.54 01/22/2021    GLC 93 04/02/2021     (H) 01/22/2021     COAGS: No results found for: PTT, INR, FIBR  POC:   Lab Results   Component Value Date    BGM 96 05/06/2021    HCG Positive (A) 06/18/2020     HEPATIC:   Lab Results   Component Value Date    ALBUMIN 2.5 (L) 04/02/2021    PROTTOTAL 6.6 (L) 04/02/2021    ALT 29 04/02/2021    AST 16 04/02/2021    ALKPHOS 71 04/02/2021    BILITOTAL 0.2 04/02/2021     OTHER:   Lab Results   Component Value Date    LATHA 8.4 (L) 04/02/2021    TSH 0.74 04/02/2021       Anesthesia Plan    ASA Status:  2      Anesthesia Type: Epidural.              Consents    Anesthesia Plan(s) and associated risks, benefits, and realistic alternatives discussed. Questions answered and patient/representative(s) expressed understanding.     - Discussed with:  Patient         Postoperative Care            Comments:    The risks and benefits of anesthesia, and the alternatives where applicable, have been discussed with the patient, and they wish to proceed.       neg OB ROS.       ADARSH Gaines CRNA

## 2021-05-06 NOTE — PROGRESS NOTES
Intrathecal placed. Pt states having no pain. Cervix rechecked per Dr. Holder. Difficult to tell dilation with Ho bulb in place, but balloon still in place. FHTs category 1.

## 2021-05-06 NOTE — PROGRESS NOTES
Doing well. Had intrathecal placed, intending to do epidural but apparently placement was difficult. She is much more comfortable.   No concerns.   No obvious leak of fluid.     Vitals:    05/06/21 1300 05/06/21 1302 05/06/21 1304 05/06/21 1309   BP: 124/68 (!) 144/61 117/64 119/66   Pulse:       Resp:       Temp: 98.1  F (36.7  C)      TempSrc: Oral      SpO2: 98%   98%      Vitals noted.  Patient alert, oriented, and in no acute distress.   On cervix check, I can feel the ball of the balloon in the cervix, sticking out 2-3 cm. I can't feel cervix completely around the ball but estimating 3-4 cm dilated. Small amount of bloody mucus present.      with periods of decreased variability but moderate variability present at this time. accels present, no decels.   Ctx q 2-3 minutes.     Recent Labs   Lab 05/06/21  1320 05/06/21  1200 05/06/21  1053 05/06/21  0827 05/06/21  0513 05/06/21  0102   BGM 92 75 89 96 104* 83         A:  Term SIUP, AMA, GDM A2, s/p cervical ripening and now induction of labor. Doing well, progressing with pitocin and llamas intracervical balloon.     P: continue pitocin induction.  Close monitoring of glucoses which have been normal.   Hasn't needed insulin since arrival, currently on clear liquid diet.   Fetal status reassuring.   Anyi Holder MD

## 2021-05-07 ENCOUNTER — ANESTHESIA EVENT (OUTPATIENT)
Dept: OBGYN | Facility: CLINIC | Age: 41
End: 2021-05-07
Payer: COMMERCIAL

## 2021-05-07 ENCOUNTER — ANESTHESIA (OUTPATIENT)
Dept: OBGYN | Facility: CLINIC | Age: 41
End: 2021-05-07
Payer: COMMERCIAL

## 2021-05-07 LAB
ABO + RH BLD: NORMAL
ABO + RH BLD: NORMAL
BLOOD BANK CMNT PATIENT-IMP: NORMAL
DATE RH IMM GL GVN: NORMAL
FETAL CELL SCN BLD QL ROSETTE: NORMAL
GLUCOSE BLDC GLUCOMTR-MCNC: 106 MG/DL (ref 70–99)
GLUCOSE BLDC GLUCOMTR-MCNC: 76 MG/DL (ref 70–99)
HGB BLD-MCNC: 12.4 G/DL (ref 11.7–15.7)
RH IG VIALS RECOM PATIENT: NORMAL

## 2021-05-07 PROCEDURE — 250N000011 HC RX IP 250 OP 636: Performed by: FAMILY MEDICINE

## 2021-05-07 PROCEDURE — 250N000009 HC RX 250: Performed by: NURSE ANESTHETIST, CERTIFIED REGISTERED

## 2021-05-07 PROCEDURE — 85018 HEMOGLOBIN: CPT | Performed by: FAMILY MEDICINE

## 2021-05-07 PROCEDURE — 120N000001 HC R&B MED SURG/OB

## 2021-05-07 PROCEDURE — 3E0R3GC INTRODUCTION OF OTHER THERAPEUTIC SUBSTANCE INTO SPINAL CANAL, PERCUTANEOUS APPROACH: ICD-10-PCS | Performed by: FAMILY MEDICINE

## 2021-05-07 PROCEDURE — 370N000003 HC ANESTHESIA WARD SERVICE

## 2021-05-07 PROCEDURE — 36415 COLL VENOUS BLD VENIPUNCTURE: CPT | Performed by: FAMILY MEDICINE

## 2021-05-07 PROCEDURE — 86900 BLOOD TYPING SEROLOGIC ABO: CPT | Performed by: FAMILY MEDICINE

## 2021-05-07 PROCEDURE — 250N000013 HC RX MED GY IP 250 OP 250 PS 637: Performed by: FAMILY MEDICINE

## 2021-05-07 PROCEDURE — 85461 HEMOGLOBIN FETAL: CPT | Performed by: FAMILY MEDICINE

## 2021-05-07 PROCEDURE — 86901 BLOOD TYPING SEROLOGIC RH(D): CPT | Performed by: FAMILY MEDICINE

## 2021-05-07 PROCEDURE — 999N001017 HC STATISTIC GLUCOSE BY METER IP

## 2021-05-07 RX ORDER — MODIFIED LANOLIN
OINTMENT (GRAM) TOPICAL
Qty: 21 G | Refills: 0 | Status: SHIPPED | OUTPATIENT
Start: 2021-05-07 | End: 2022-02-11

## 2021-05-07 RX ORDER — AMOXICILLIN 250 MG
1 CAPSULE ORAL 2 TIMES DAILY PRN
Qty: 30 TABLET | Refills: 0 | Status: SHIPPED | OUTPATIENT
Start: 2021-05-07 | End: 2021-07-09

## 2021-05-07 RX ORDER — NALOXONE HYDROCHLORIDE 0.4 MG/ML
0.4 INJECTION, SOLUTION INTRAMUSCULAR; INTRAVENOUS; SUBCUTANEOUS
Status: DISCONTINUED | OUTPATIENT
Start: 2021-05-07 | End: 2021-05-09 | Stop reason: HOSPADM

## 2021-05-07 RX ORDER — IBUPROFEN 600 MG/1
600 TABLET, FILM COATED ORAL EVERY 6 HOURS PRN
Qty: 30 TABLET | Refills: 0 | Status: SHIPPED | OUTPATIENT
Start: 2021-05-07 | End: 2021-07-09

## 2021-05-07 RX ORDER — NALOXONE HYDROCHLORIDE 0.4 MG/ML
0.2 INJECTION, SOLUTION INTRAMUSCULAR; INTRAVENOUS; SUBCUTANEOUS
Status: DISCONTINUED | OUTPATIENT
Start: 2021-05-07 | End: 2021-05-09 | Stop reason: HOSPADM

## 2021-05-07 RX ORDER — LIDOCAINE HYDROCHLORIDE 10 MG/ML
INJECTION, SOLUTION INFILTRATION; PERINEURAL PRN
Status: DISCONTINUED | OUTPATIENT
Start: 2021-05-07 | End: 2021-05-08

## 2021-05-07 RX ORDER — OXYCODONE HYDROCHLORIDE 5 MG/1
5 TABLET ORAL EVERY 4 HOURS PRN
Status: DISCONTINUED | OUTPATIENT
Start: 2021-05-07 | End: 2021-05-09 | Stop reason: HOSPADM

## 2021-05-07 RX ORDER — ACETAMINOPHEN 325 MG/1
650 TABLET ORAL EVERY 4 HOURS PRN
COMMUNITY
Start: 2021-05-07 | End: 2021-07-09

## 2021-05-07 RX ADMIN — IBUPROFEN 800 MG: 800 TABLET, FILM COATED ORAL at 22:03

## 2021-05-07 RX ADMIN — PRENATAL VIT W/ FE FUMARATE-FA TAB 27-0.8 MG 1 TABLET: 27-0.8 TAB at 08:40

## 2021-05-07 RX ADMIN — IBUPROFEN 800 MG: 800 TABLET, FILM COATED ORAL at 08:41

## 2021-05-07 RX ADMIN — ACETAMINOPHEN 650 MG: 325 TABLET, FILM COATED ORAL at 20:01

## 2021-05-07 RX ADMIN — OXYCODONE HYDROCHLORIDE 5 MG: 5 TABLET ORAL at 16:01

## 2021-05-07 RX ADMIN — DOCUSATE SODIUM AND SENNOSIDES 1 TABLET: 8.6; 5 TABLET ORAL at 08:41

## 2021-05-07 RX ADMIN — LIDOCAINE HYDROCHLORIDE 50 MG: 10 INJECTION, SOLUTION INFILTRATION; PERINEURAL at 21:59

## 2021-05-07 RX ADMIN — LEVOTHYROXINE SODIUM 100 MCG: 50 TABLET ORAL at 08:45

## 2021-05-07 RX ADMIN — IBUPROFEN 800 MG: 800 TABLET, FILM COATED ORAL at 00:54

## 2021-05-07 RX ADMIN — ACETAMINOPHEN 650 MG: 325 TABLET, FILM COATED ORAL at 00:54

## 2021-05-07 RX ADMIN — ACETAMINOPHEN 650 MG: 325 TABLET, FILM COATED ORAL at 16:01

## 2021-05-07 RX ADMIN — OXYCODONE HYDROCHLORIDE 5 MG: 5 TABLET ORAL at 20:01

## 2021-05-07 RX ADMIN — IBUPROFEN 800 MG: 800 TABLET, FILM COATED ORAL at 14:53

## 2021-05-07 RX ADMIN — DOCUSATE SODIUM AND SENNOSIDES 1 TABLET: 8.6; 5 TABLET ORAL at 20:01

## 2021-05-07 RX ADMIN — DOCUSATE SODIUM AND SENNOSIDES 2 TABLET: 8.6; 5 TABLET ORAL at 00:53

## 2021-05-07 RX ADMIN — HUMAN RHO(D) IMMUNE GLOBULIN 300 MCG: 300 INJECTION, SOLUTION INTRAMUSCULAR at 12:09

## 2021-05-07 NOTE — PLAN OF CARE
S-(situation): shift note    B-(background): ,  1st pp day    A-(assessment): VSS,  at lunch. Independent with all pp cares, assisted as needed. Voiding well, did not need further catheterization. Denied headache this morning, but has had headache/neck pain this afternoon. Starts in neck, radiates into back of head. Lying down did not relieve. Anesthesia here to evaluate. Minimal relief with oxycodone.     R-(recommendations): Cont routine pp cares. Using aqua k pad, will use oxycodone, ibuyprofen, tylenol for neck/head pain and continue to assess

## 2021-05-07 NOTE — PROGRESS NOTES
Doing well. Had her ITN re-bolused. Labor became much more intense again, she is breathing through ctx and handling it well.   Vitals:    05/06/21 1913 05/06/21 1915 05/06/21 1934 05/06/21 1935   BP: 106/67 116/70 124/58    Pulse:       Resp:       Temp:       TempSrc:       SpO2: 98%   99%      Recent Labs   Lab 05/06/21  1756 05/06/21  1611 05/06/21  1431 05/06/21  1320 05/06/21  1200 05/06/21  1053   BGM 78 92 69* 92 75 89      Had a recent glucose of 58.      baseline, accels present. Good variability.   Ctx still regular, q 1 1/2-2 minutes.     SVE: between ctx cervix very soft, stretchy to 5 easily. With ctx, tighten down to 4, bulging bag.   With her permission, I did AROM with small amount of yellowish fluid, no meconium seen, small amount of bloody mucus.     Will continue present management. If no progress over next 1-2 hours, will place IUPC.  Will change IVF to D5 LR for hypoglycemia.   Anyi Holder MD

## 2021-05-07 NOTE — PROGRESS NOTES
S: Transfer to postpartum  B: Vaginal birth @ 2200 21, Right periurethral tear and a 2nd degree ml tear, with repair, breast feeding    A: Mother and baby transferred to postpartum unit at 0100 21 via evaristo steady after completion of immediate recovery period. Patient oriented to room. Mother and baby bonding well and in satisfactory condition upon transfer.  R: Anticipate routine postpartum care.

## 2021-05-07 NOTE — DISCHARGE INSTRUCTIONS
[facility] Discharge Instructions     Discharge disposition:  Discharged to home       Diet:  Regular with focus on healthy low carb diet       Activity No sex for 6 week(s)       Follow-up: Follow up with Dr. Holder in 6 weeks - we'll help you make this appointment when you're here next week with the baby's appointment.        Additional instructions: The birthplace staff is available 24 hours 7 days for questions about you or your baby.  Please don't hesitate to call with any concerns.        Additional Patient Information:  Enjoy beautiful little Lissy!    Postpartum Vaginal Delivery Instructions    Activity       Ask family and friends for help when you need it.    Do not place anything in your vagina for 6 weeks.    You are not restricted on other activities, but take it easy for a few weeks to allow your body to recover from delivery.  You are able to do any activities you feel up to that point.    No driving until you have stopped taking your pain medications (usually two weeks after delivery).     Call your health care provider if you have any of these symptoms:       Increased pain, swelling, redness, or fluid around your stiches from an episiotomy or perineal tear.    A fever above 100.4 F (38 C) with or without chills when placing a thermometer under your tongue.    You soak a sanitary pad with blood within 1 hour, or you see blood clots larger than a golf ball.    Bleeding that lasts more than 6 weeks.    Vaginal discharge that smells bad.    Severe pain, cramping or tenderness in your lower belly area.    A need to urinate more frequently (use the toilet more often), more urgently (use the toilet very quickly), or it burns when you urinate.    Nausea and vomiting.    Redness, swelling or pain around a vein in your leg.    Problems breastfeeding or a red or painful area on your breast.    Chest pain and cough or are gasping for air.    Problems coping with sadness,  anxiety, or depression.  If you have any concerns about hurting yourself or the baby, call your provider immediately.     You have questions or concerns after you return home.     Keep your hands clean:  Always wash your hands before touching your perineal area and stitches.  This helps reduce your risk of infection.  If your hands aren't dirty, you may use an alcohol hand-rub to clean your hands. Keep your nails clean and short.

## 2021-05-07 NOTE — PLAN OF CARE
S: Shift review   B:Estefania is a  who delivered vaginally on 21 at 2200 with a 1st degree right periurethral laceration with no repair and 2nd degree midline that was repaired.  She had an intrathecal and the CRNA believes she will most likely get a Spinal HA.  Pt does currently have a HA.  GDM on insulin.  PP orders for BG are to be at noon and HS.  Her BG after delivery was 114  A: VSS, patient is independent with mobility, pain well controlled with p.o. pain meds. Handles baby with confidence.  R: Continue with routine PP care

## 2021-05-07 NOTE — PROGRESS NOTES
Pt was brought to bathroom via evaristo steady.  Unable to void.  Bottom is still numb.  St cath performed at 0015.  Pt educated to not get out of bed without nursing staff present due to the risk of falling.  Pt verbalizes understanding with plan of care.

## 2021-05-07 NOTE — ANESTHESIA POSTPROCEDURE EVALUATION
Patient: Estefania Arguello    * No procedures listed *    Diagnosis:* No pre-op diagnosis entered *  Diagnosis Additional Information: No value filed.    Anesthesia Type:  Epidural    Note:  Disposition: Inpatient   Postop Pain Control: Uneventful            Sign Out: Well controlled pain   PONV: No   Neuro/Psych: Uneventful            Sign Out: Acceptable/Baseline neuro status   Airway/Respiratory: Uneventful            Sign Out: Acceptable/Baseline resp. status   CV/Hemodynamics: Uneventful            Sign Out: Acceptable CV status   Other NRE: NONE   DID A NON-ROUTINE EVENT OCCUR? No    Event details/Postop Comments:  Pt was happy with her anesthesia care.  Unfortunately Pt. Did have have two wet taps with her epidural placement and the second wet tap was converted to an intrathecal catheter.  I advised the pt she may have some soreness at the epidural sites and this is normal.  However if the soreness continues over a week or if redness is noted around the site to let anesthesia know. Also discussed the S&S of PDPHA and discussed the procedure of EBP in necessary.  Pt relates she did have a headache last pm but this am she was siting up in bed and stated she had no headache at the time of our conversation.  She knows to notify staff if a HA should develop later today.   I will follow up with the pt if needed.           Last vitals:  Vitals:    05/06/21 2343 05/07/21 0400 05/07/21 0800   BP: 111/56 122/61 103/66   Pulse:  83    Resp:  16    Temp:  98.3  F (36.8  C)    SpO2:          Last vitals prior to Anesthesia Care Transfer:      Electronically Signed By: ADARSH York CRNA  May 7, 2021  9:54 AM

## 2021-05-07 NOTE — PROGRESS NOTES
Doing well. rebolused again due to intense pain. Pitocin was turned down to 10 mU/min. Doing better s/p rebolus.   Feeling her pain more in vaginal area since rupture.   Now more in abdomen since re-bolus.   Few early decels seen.     Vitals:    05/06/21 2043 05/06/21 2045 05/06/21 2050 05/06/21 2053   BP: 109/68 109/68  113/58   Pulse:       Resp:       Temp:       TempSrc:       SpO2:  100% 100%       Recent Labs   Lab 05/06/21 2015 05/06/21  1907 05/06/21  1756 05/06/21  1611 05/06/21  1431 05/06/21  1320   BGM 99 58* 78 92 69* 92      Vitals noted.  Patient alert, oriented, and in no acute distress.   Legs more heavy now.   FHT still 130s with good variability.   Ctx q 2-3 minutes    SVE anterior lip (thick) 9.5/100%/-2  Good pressure and fetal descent with ctx.   With legs up in stirrups and ctx she is at 0 station.     A:  Term SIUP, sp induction for AMA, GDM A2.   Doing well. Near complete and ready to push. Glucoses stable on D5LR infusion.   P:  Will give her another 30-60 minutes and recheck her. She is comfortable with legs in stirrups, but since she only has an intrathecal and it has been wearing off her frequently, we will not wait too long to start pushing.  Anyi Holder MD

## 2021-05-07 NOTE — PROGRESS NOTES
[facility] Obstetrics Post-Partum Progress Note          Assessment and Plan:    Assessment:   Post-partum day #1  Induced vaginal delivery secondary to AMA and GDM A2  Second degree perineal tear and repair  Hypothyroidism  L&D complications: None      Doing well.  No excessive bleeding, Hgb stable  Pain well-controlled.  Glucoses fair, not in range for insulin but not fully normal.       Plan:   Ambulation encouraged  Breast feeding strategies discussed  Will stop glucose checks in hospital and follow up as outpatient at postpartum check. Discussed increased lifetime risk of diabetes  Anticipate discharge tomorrow           Interval History:   Doing well.  Pain is well-controlled.  Minimal headache, not as bad as she was expecting. No fevers.  No history of foul-smelling vaginal discharge.  Good appetite.  Denies chest pain, shortness of breath, nausea or vomiting.  Vaginal bleeding is similar to a heavy menstrual flow.  Ambulatory.  Breastfeeding well.          Significant Problems:    GDM - see below for glucose readings. Feels fine.           Review of Systems:    as above          Medications:     All medications related to the patient's surgery have been reviewed  Current Facility-Administered Medications   Medication     acetaminophen (TYLENOL) tablet 650 mg     [START ON 5/8/2021] bisacodyl (DULCOLAX) Suppository 10 mg     Blood Bank will determine if patient is eligible for and the proper dosage of Rho (D) immune globulin (RhoGam)     glucose gel 15-30 g    Or     dextrose 50 % injection 25-50 mL    Or     glucagon injection 1 mg     ibuprofen (ADVIL/MOTRIN) tablet 800 mg     lactated ringers BOLUS 1,000 mL     lanolin cream     levothyroxine (SYNTHROID/LEVOTHROID) tablet 100 mcg     [START ON 5/8/2021] magnesium hydroxide (MILK OF MAGNESIA) suspension 30 mL     misoprostol (CYTOTEC) suppository 800 mcg     No MMR Needed - Assessment: Patient does not need MMR vaccine     No Tdap Needed -  Assessment: Patient does not need Tdap vaccine     oxytocin (PITOCIN) 30 units in 500 mL 0.9% NaCl infusion     oxytocin (PITOCIN) 30 units in 500 mL 0.9% NaCl infusion     oxytocin (PITOCIN) injection 10 Units     prenatal multivitamin w/iron per tablet 1 tablet     senna-docusate (SENOKOT-S/PERICOLACE) 8.6-50 MG per tablet 1 tablet    Or     senna-docusate (SENOKOT-S/PERICOLACE) 8.6-50 MG per tablet 2 tablet     tranexamic acid 1 g in 100 mL 0.7% NaCl IV bag (premix)             Physical Exam:     All vitals stable  Patient Vitals for the past 8 hrs:   BP Temp Temp src Pulse Resp   05/07/21 0800 103/66 96  F (35.6  C) Oral 62 16     Vitals noted.  Patient alert, oriented, and in no acute distress.   CV:  RRR without murmur.   Respiratory:  Lungs clear to auscultation bilaterally.   Abdomen:  Soft, nontender with palpation, nondistended with good bowel sounds and no masses or hepatosplenomegaly.    Extremities warm and dry without cyanosis, clubbing or edema.   Compression socks on.           Data:   All laboratory data related to this surgery reviewed  Hemoglobin   Date Value Ref Range Status   05/07/2021 12.4 11.7 - 15.7 g/dL Final   ]   Recent Labs   Lab 05/07/21  1212 05/06/21  2311 05/06/21  2113 05/06/21 2015 05/06/21  1907 05/06/21  1756   * 114* 73 99 58* 78        Anyi Holder MD

## 2021-05-07 NOTE — L&D DELIVERY NOTE
Delivery Summary    Estefania Arguello MRN# [id number]   Age: 40 year old YOB: 1980     ASSESSMENT & PLAN:   Estefania Arguello is a 40 year old  female who presents at 39w0d weeks with induction of labor due to GDM A2, AMA.      Her prenatal course was otherwise uncomplicated.  She received her prenatal care here in [place]. She was diagnosed with GDM at 24 weeks, started insulin at 28 weeks and was well controlled.      Her routine prenatal labs were all normal as follows:   A negative  antibody screen negative  Rubella immune   Trepab negative  HepBsAg negative  HIV negative  She failed her 1 hour GTT.   Her GBS is negative    On arrival at the birthplace her cervical exam is as follows:    FT/20%/-3    She received 2 doses of cytotec and began to contract. She then had pitocin induction, along with llamas intracervical balloon.   She received IV fentanyl x 1 with brief relief of her labor pain.   She had attempted epidural but it went in the ITN space, provided good relief of her labor pain but had to be intermittently rebolused.   She had AROM at 19:34.    She labored well and was completely dilated at 9:42 pm.  First stage of labor was 11 hrs 12 min.     She pushed very well with  viable female infant over an intact perineum at 2200.  There was a loose nuchal cord x 3 that was easily reduced. The infant was then laid on mother's abdomen, received drying and tactile stimulation.  The cord was delayed clamped x 2 and cut by the FOB who was in attendance.  There was spontaneous cry and APGARS at 1 and 5 minutes were 8 and 10 respectively.   Second stage of labor was 0 hrs and 18 minutes.     The placenta delivered spontaneously, intact with a 3VC at 2205.  Pitocin was given IV after delivery of the placenta.  Third stage of labor was 5 minutes.     Examination of the vaginal walls and perineum revealed a 1st degree wide right periurethral laceration that was not bleeding and not repaired,  and a midline 2nd degree posterior perineal laceration that was repaired.  Fundal massage proved that the uterine fundus was firm.  EBL was 300 ml.      The posterior midline laceration was repaired in the usual sterile running fashion using 3-0 chromic suture.  Excellent hemostasis was obtained.  Repeat examination of the vaginal walls and perineum revealed no further lacerations and no ongoing bleeding.  Digital rectal exam was normal.      A:   viable female infant at 39 weeks gestation with APGARS of 8 and 10 weighing 6 lbs 13 oz.    Maternal GDM A2  P:  Mom, dad and baby are doing well after delivery.  Mom plans to breastfeed baby and baby will be rooming in with mom.  I anticipate they will both have a normal course.  Will monitor glucose for baby per protocol.     Mom's glucoses have been all normal during labor. Will check once within 2 hours PP and then check twice tomorrow, with noon meal and at bedtime. If normal, stop checking.     Anyi Holder MD           [place], Female-Estefania [[id number]]    Labor Event Times    Labor onset date: 21 Onset time: 10:30 AM   Dilation complete date: 21 Complete time:  9:42 PM   Start pushing date/time: 20215      Labor Length    1st Stage (hrs): 11 (min): 12   2nd Stage (hrs): 0 (min): 18   3rd Stage (hrs): 0 (min): 5      Labor Events     labor?: No   steroids: None  Labor Type: Induction/Cervical ripening  Predominate monitoring during 1st stage: continuous electronic fetal monitoring     Antibiotics received during labor?: No     Rupture identifier: Sac 1  Rupture date/time: 21 1934   Rupture type: Artificial Rupture of Membranes  Fluid color: Clear  Fluid odor: Normal     Induction: Misoprostol, Mechanical ripening agent  Induction date/time: 21 0600   Cervical ripening date/time: 21 2100      1:1 continuous labor support provided by?: RN Labor partogram used?: no      Delivery/Placenta Date and Time   "  Delivery Date: 21 Delivery Time: 10:00 PM   Placenta Date/Time: 2021 10:05 PM  Delivering clinician: Anyi Holder MD   Other personnel present at delivery:  Provider Role   Rozina Rivero, SANAZ Delivery Assist   Shana Santana RN Delivery Nurse         Vaginal Counts     Initial count performed by 2 team members:  Two Team Members   Dr. Gallo Santana RN       Needles Suture Needles Sponges (RETIRED) Instruments   Initial counts 2 0 5    Added to count 0 1 0    Relief counts       Final counts 2 1 5          Placed during labor Accounted for at the end of labor   FSE No NA   IUPC No NA   Cervadil No NA              Final count performed by 2 team members:  Two Team Members   Dr. Gallo Santana      Final count correct?: Yes     Apgars    Living status: Living   1 Minute 5 Minute 10 Minute 15 Minute 20 Minute   Skin color: 1  2       Heart rate: 2  2       Reflex irritability: 2  2       Muscle tone: 1  2       Respiratory effort: 2  2       Total: 8  10       Apgars assigned by: ROZINA RIVERO RN     Cord    Vessels: 3 Vessels    Cord Complications: Nuchal   Nuchal Intervention: reduced         Nuchal cord description: loose nuchal cord         Cord Blood Disposition: Lab    Gases Sent?: No    Delayed cord clamping?: Yes    Cord Clamping Delay (seconds): >120 seconds    Stem cell collection?: No       [facility] Resuscitation    Methods: None     [facility] [place] Measurements    Weight: 6 lb 13 oz Length: 1' 8\"   Head circumference: 12.5 cm Chest circumference: 12.5 cm      Skin to Skin and Feeding Plan    Skin to skin initiation date/time: 1841    Skin to skin with: Mother  Skin to skin end date/time: 1841    Breastfeeding initiated date/time: 2021 2225     Labor Events and Shoulder Dystocia    Fetal Tracing Prior to Delivery: Category 2  Shoulder dystocia present?: Neg     Delivery (Maternal) (Provider to Complete) (124595)    Episiotomy: None  Perineal " lacerations: 2nd Repaired?: Yes   Periurethral laceration: right Repaired?: No   Repair suture: 3-0 Chromic  Genital tract inspection done: Pos     Blood Loss  Mother: Estefania Arguello #[id number]   Start of Mother's Information    IO Blood Loss  05/06/21 1030 - 05/06/21 2310    Delivery QBL (mL) Hospital Encounter 300 mL    Total  300 mL         End of Mother's Information  Mother: Estefania Arguello #[id number]          Delivery - Provider to Complete (121237)    Delivering clinician: Anyi Holder MD  Attempted Delivery Types (Choose all that apply): Spontaneous Vaginal Delivery  Delivery Type (Choose the 1 that will go to the Birth History): Vaginal, Spontaneous                   Other personnel:  Provider Role   Rozina Eng RN Delivery Assist   Shana Santana RN Delivery Nurse                Placenta    Date/Time: 5/6/2021 10:05 PM  Removal: Spontaneous  Disposition: Patient possesion           Anesthesia    Method: Intrathecal, INTRAVENOUS                 Presentation and Position    Presentation: Vertex    Position: Right Occiput Anterior                 Anyi Holder MD

## 2021-05-07 NOTE — PROGRESS NOTES
S: Delivery  B: induced Labor,  @ [id number] gestation, GBS negative with antibiotic treatment.  A: Patient delivered Vaginal at 2200 with Dr. ROSALVA Holder in attendance. 1st degree right periurethral and 2nd degree midline perineal. Repaired Baby delivered and placed on mother's low abdomen for delayed cord clamping where baby was dried and stimulated. After cord clamped and cut, baby was placed skin to skin on mother's chest within 5 minutes following delivery . Apgars 8/10. Placenta was delivered @ 2208 followed by administration of oxytocin. Bonding initiated with mom and baby. Educated mother on importance of exclusive breast feeding, expected feeding readiness cues and encouraged her to observe for feeding cues. Mother informed that breast feeding assistance would be provided. See flowsheet for VS and PP checks. Labor care plan goals met.  R: Expect routine postpartum care. Anticipate first feeding within the hour.

## 2021-05-08 PROCEDURE — 120N000001 HC R&B MED SURG/OB

## 2021-05-08 PROCEDURE — 250N000013 HC RX MED GY IP 250 OP 250 PS 637: Performed by: FAMILY MEDICINE

## 2021-05-08 RX ADMIN — ACETAMINOPHEN 650 MG: 325 TABLET, FILM COATED ORAL at 18:46

## 2021-05-08 RX ADMIN — OXYCODONE HYDROCHLORIDE 5 MG: 5 TABLET ORAL at 22:06

## 2021-05-08 RX ADMIN — OXYCODONE HYDROCHLORIDE 5 MG: 5 TABLET ORAL at 14:36

## 2021-05-08 RX ADMIN — DOCUSATE SODIUM AND SENNOSIDES 1 TABLET: 8.6; 5 TABLET ORAL at 20:42

## 2021-05-08 RX ADMIN — ACETAMINOPHEN 650 MG: 325 TABLET, FILM COATED ORAL at 22:06

## 2021-05-08 RX ADMIN — ACETAMINOPHEN 650 MG: 325 TABLET, FILM COATED ORAL at 01:18

## 2021-05-08 RX ADMIN — DOCUSATE SODIUM AND SENNOSIDES 2 TABLET: 8.6; 5 TABLET ORAL at 08:09

## 2021-05-08 RX ADMIN — ACETAMINOPHEN 650 MG: 325 TABLET, FILM COATED ORAL at 14:37

## 2021-05-08 RX ADMIN — OXYCODONE HYDROCHLORIDE 5 MG: 5 TABLET ORAL at 08:42

## 2021-05-08 RX ADMIN — OXYCODONE HYDROCHLORIDE 5 MG: 5 TABLET ORAL at 18:46

## 2021-05-08 RX ADMIN — PRENATAL VIT W/ FE FUMARATE-FA TAB 27-0.8 MG 1 TABLET: 27-0.8 TAB at 18:52

## 2021-05-08 RX ADMIN — LEVOTHYROXINE SODIUM 100 MCG: 50 TABLET ORAL at 08:09

## 2021-05-08 RX ADMIN — ACETAMINOPHEN 650 MG: 325 TABLET, FILM COATED ORAL at 08:09

## 2021-05-08 NOTE — PROGRESS NOTES
At 2150 patient positioned for blood patch, consented, time out. At 2200 blood drawn by lab, given to Triston and placed in back.

## 2021-05-08 NOTE — PROGRESS NOTES
Triston with anesthesia called to check on patient's symptoms. Requested patient to be up walking in room, then to lay flat after a while and assess symptoms. RN to call him back on his cell phone in a while.

## 2021-05-08 NOTE — PLAN OF CARE
Day 2 post vaginal delivery with 2nd degree laceration and spinal headache.    Patient had blood patch during evening; has had improvement in pain. Able to turn neck side to side and overall movement has improved. Patient had taken Tylenol, Oxy and Ibuprofen in evening. Per conversation with Anesthesia, patient to limit taking Ibuprofen/Advil etc, is taking Tylenol PRN. Also using heat/ice to neck. Fundus firm at U, light bleeding. Tucks and Spray to perineum. Breastfeeding using the nipple shield. Is hand expressing and also used Spectra pump once during night. Giving expressed breast milk with finger feeds. Is able to perform cares for self and baby better since having blood patch. Continue postpartum cares.      Problem: Adult Inpatient Plan of Care  Goal: Patient-Specific Goal (Individualized)  Outcome: Improving  Flowsheets (Taken 5/8/2021 [id number])  Individualized Care Needs: taught how to use Spectra pump

## 2021-05-08 NOTE — ANESTHESIA PREPROCEDURE EVALUATION
"Anesthesia Pre-Procedure Evaluation    Patient: Estefania Arguello   MRN: [id number] : 1980        Preoperative Diagnosis: * No pre-op diagnosis entered *   Procedure : * No procedures listed *     Past Medical History:   Diagnosis Date     Back pain      Hypothyroidism      Seasonal allergies       Past Surgical History:   Procedure Laterality Date     EYE SURGERY      \"benign tumor above right eye\"     MOUTH SURGERY      \"benign tumor under molar removed\"      No Known Allergies   Social History     Tobacco Use     Smoking status: Former Smoker     Quit date: 2018     Years since quittin.8     Smokeless tobacco: Former User   Substance Use Topics     Alcohol use: Not Currently      Wt Readings from Last 1 Encounters:   21 104.8 kg (231 lb)        Anesthesia Evaluation   Pt has had prior anesthetic. Type: OB Labor Epidural.    History of anesthetic complications   PDPHA.    ROS/MED HX  ENT/Pulmonary:  - neg pulmonary ROS     Neurologic:  - neg neurologic ROS     Cardiovascular:  - neg cardiovascular ROS     METS/Exercise Tolerance:     Hematologic:  - neg hematologic  ROS     Musculoskeletal:       GI/Hepatic:     (+) GERD,     Renal/Genitourinary:       Endo:     (+) type I DM, thyroid problem,     Psychiatric/Substance Use:  - neg psychiatric ROS     Infectious Disease:       Malignancy:       Other:            Physical Exam    Airway        Mallampati: II   TM distance: > 3 FB   Neck ROM: full   Mouth opening: > 3 cm    Respiratory Devices and Support         Dental  no notable dental history         Cardiovascular   cardiovascular exam normal          Pulmonary   pulmonary exam normal                OUTSIDE LABS:  CBC:   Lab Results   Component Value Date    WBC 8.6 2021    WBC 6.7 2021    HGB 12.4 2021    HGB 13.5 2021    HCT 39.2 2021    HCT 37.7 2021     2021     2021     BMP:   Lab Results   Component Value Date     " 04/02/2021     01/22/2021    POTASSIUM 4.1 04/02/2021    POTASSIUM 3.6 01/22/2021    CHLORIDE 105 04/02/2021    CHLORIDE 105 01/22/2021    CO2 26 04/02/2021    CO2 26 01/22/2021    BUN 13 04/02/2021    BUN 10 01/22/2021    CR 0.54 04/02/2021    CR 0.54 01/22/2021    GLC 93 04/02/2021     (H) 01/22/2021     COAGS: No results found for: PTT, INR, FIBR  POC:   Lab Results   Component Value Date     (H) 05/07/2021    HCG Positive (A) 06/18/2020     HEPATIC:   Lab Results   Component Value Date    ALBUMIN 2.5 (L) 04/02/2021    PROTTOTAL 6.6 (L) 04/02/2021    ALT 29 04/02/2021    AST 16 04/02/2021    ALKPHOS 71 04/02/2021    BILITOTAL 0.2 04/02/2021     OTHER:   Lab Results   Component Value Date    LATHA 8.4 (L) 04/02/2021    TSH 0.74 04/02/2021       Anesthesia Plan    ASA Status:  2                    Consents            Postoperative Care            Comments:    Called to ICU for EBP.  Pt had labor epidural yesterday with know wet tap and intrathecal catheter for labor pain management.  Pt currently has occipital HA that radiates to her forehead and behind her eyes.  Pt has has a complaint of a severe stiff neck.  Her symptoms are worse with sitting or standing and get better with lying flat in bed. Pt denies any other symptoms.  I discussed EBP plan/risks with pt and pt's significant other, both state understanding and wish to proceed.  Pt tolerated procedure well without any complications.       neg OB ROS.       Bairon Sesay, APRN CRNA

## 2021-05-08 NOTE — PROGRESS NOTES
Patient had ambulated in hallways, upon return to room around 2000 was given Tylenol and Oxy. Patient then assisted to laying flat. Patient noted headache in front of head/behind eyes while walking/sitting, after a few minutes of laying flat the headache went away. However, patient has horrible pain in her neck and upper/mid back area. Patient is unable to turn her head side to side and is too uncomfortable to look downward at baby during feeds. Significant other is at bedside and helpful in cares for patient and baby.    Triston with anesthesia updated on patient's symptoms. Offered patient to have a blood patch tonight, or patient could wait until tomorrow trying other methods to resolve the pain and then be reassessed. Patient would like to try blood patch tonight.

## 2021-05-08 NOTE — ANESTHESIA POSTPROCEDURE EVALUATION
Patient: Estefania Arguello    * No procedures listed *    Diagnosis:* No pre-op diagnosis entered *  Diagnosis Additional Information: No value filed.    Anesthesia Type:  No value filed.    Note:  Disposition: Inpatient   Postop Pain Control: Uneventful            Sign Out: Well controlled pain   PONV: No   Neuro/Psych: Uneventful            Sign Out: Acceptable/Baseline neuro status   Airway/Respiratory: Uneventful            Sign Out: Acceptable/Baseline resp. status   CV/Hemodynamics: Uneventful            Sign Out: Acceptable CV status   Other NRE: NONE   DID A NON-ROUTINE EVENT OCCUR? No    Event details/Postop Comments:  Pt was happy with anesthesia care.  No complications. Pts HA and stiff neck symptoms resolved.  Pt aware to take it easy for the next 24 hours and no lifting anything heavier than her baby.   I will follow up with the pt if needed.           Last vitals:  Vitals:    05/07/21 2218 05/07/21 2221 05/07/21 2225   BP: 135/79  133/82   Pulse: 78     Resp: 17     Temp: 96.1  F (35.6  C)     SpO2: 100% 99% 100%       Last vitals prior to Anesthesia Care Transfer:      Electronically Signed By: ADARSH Siu CRNA  May 8, 2021  12:08 AM

## 2021-05-08 NOTE — ANESTHESIA CARE TRANSFER NOTE
Patient: Estefania Arguello    * No procedures listed *    Diagnosis: * No pre-op diagnosis entered *  Diagnosis Additional Information: No value filed.    Anesthesia Type:   No value filed.     Note:  Anesthesia Care Transfer Notewriter  Vitals: (Last set prior to Anesthesia Care Transfer)    Electronically Signed By: ADARSH Siu CRNA  May 8, 2021  12:08 AM

## 2021-05-08 NOTE — PROGRESS NOTES
[facility] Obstetrics Post-Partum Progress Note          Assessment and Plan:    Assessment:   Post-partum day #1  Normal spontaneous vaginal delivery    L&D complications: Spinal headache S/P blood patch   None      Doing well now neck pain and Headache markedly improved   No excessive bleeding      Plan:   Ambulation encouraged  Breast feeding strategies discussed going better this AM   Discharge tomorrow            Interval History:   Doing well.  Pain is well-controlled.  No fevers.  No history of foul-smelling vaginal discharge.  Good appetite.  Denies chest pain, shortness of breath, nausea or vomiting.  Vaginal bleeding is similar to a heavy menstrual flow.  Ambulatory.  Breastfeeding working with Nursing .           Significant Problems:    None          Review of Systems:    The patient denies any chest pain, shortness of breath, excessive pain, fever, chills, purulent drainage from the wound, nausea or vomiting.          Medications:   All medications related to the patient's surgery have been reviewed          Physical Exam:     All vitals stable  Patient Vitals for the past 8 hrs:   BP Temp Temp src Pulse Resp SpO2   05/08/21 0745 104/66 96.7  F (35.9  C) Oral 63 -- --   05/08/21 0400 119/68 96.9  F (36.1  C) Oral 81 16 99 %     Uterine fundus is firm, non-tender and at the level of the umbilicus          Data:     All laboratory data related to this surgery reviewed  Hemoglobin   Date Value Ref Range Status   05/07/2021 12.4 11.7 - 15.7 g/dL Final   05/05/2021 13.5 11.7 - 15.7 g/dL Final   04/02/2021 12.7 11.7 - 15.7 g/dL Final   01/22/2021 12.2 11.7 - 15.7 g/dL Final   10/07/2020 13.5 11.7 - 15.7 g/dL Final         Chester Cha MD, MD

## 2021-05-08 NOTE — ANESTHESIA PROCEDURE NOTES
Blood Patch:    Staff -        CRNA: Bairon Sesay APRN CRNA       Performed By: CRNA       Start time: 5/7/2021 9:43 PM       End time: 5/7/2021 10:06 PM  Preanesthetic Checklist:        Completed: patient identified, pre-op evaluation, timeout performed, IV checked, risks and benefits discussed, monitors and equipment checked and anesthesia consent given    Procedure Information:        Volume of Blood Injected (free text) 22 ml.       Blood collected by assistant using sterile technique.       Sterile Barriers: gloves, mask and washed/disinfected hands       Approach: midline       Injection technique: KRISTY air       Location: L3-4  Assessment:       Events: well tolerated       Reason for Blood Patch: spinal headache  Comments:  Pt. Sitting at bedside. Risks, benefits and alternatives of EBP discussed with pt.  Her questions were answered, she consents/wishes to proceed as planned.  Back exam landmarks identified, skin prep with betadine.  Lidocaine infiltration at L3 - L4. ES identified via KRISTY (Air) after 1 attempt (s) and 0 redirect (s).  No paresthesias noted.  Negative heme / CSF aspirated.  Patient appeared to tolerate procedure well, pt did complain of back pressure with administration of blood at the end.  Pt placed supine in bed after procedure and will lay flat for 45 mins to 1 hour and I will follow-up with her then.

## 2021-05-08 NOTE — PLAN OF CARE
"Continuing to have \"shooting\" neck pain, states nothing is helping, oxycodone did not really help. Encouraging lying down, will assist with breastfeeding. Has been up, walked in nieto, for about 2 hours. Report to next shift and cont to evaluate  "

## 2021-05-09 VITALS
OXYGEN SATURATION: 95 % | DIASTOLIC BLOOD PRESSURE: 69 MMHG | RESPIRATION RATE: 15 BRPM | TEMPERATURE: 96.9 F | SYSTOLIC BLOOD PRESSURE: 112 MMHG | HEART RATE: 65 BPM

## 2021-05-09 PROCEDURE — 250N000013 HC RX MED GY IP 250 OP 250 PS 637: Performed by: FAMILY MEDICINE

## 2021-05-09 RX ADMIN — DOCUSATE SODIUM AND SENNOSIDES 1 TABLET: 8.6; 5 TABLET ORAL at 08:07

## 2021-05-09 RX ADMIN — LEVOTHYROXINE SODIUM 100 MCG: 50 TABLET ORAL at 08:07

## 2021-05-09 RX ADMIN — OXYCODONE HYDROCHLORIDE 5 MG: 5 TABLET ORAL at 02:35

## 2021-05-09 RX ADMIN — ACETAMINOPHEN 650 MG: 325 TABLET, FILM COATED ORAL at 08:07

## 2021-05-09 RX ADMIN — ACETAMINOPHEN 650 MG: 325 TABLET, FILM COATED ORAL at 02:31

## 2021-05-09 RX ADMIN — IBUPROFEN 800 MG: 800 TABLET, FILM COATED ORAL at 12:03

## 2021-05-09 NOTE — PLAN OF CARE
S: Discharge  to home    B: Patient had a Vaginal delivery. ITN done for pain control, developed spinal headache, blood patch done. Baby girl Baby's name Lissy, breast/pumping/donor breastmilk. Support person's name Marcelo.     A: VSS. More independent today, initiating feedings. States still has headache when initially standing, but subsides after couple minutes. No oxycodone today.     R:  Discharge instructions reviewed and questions answered.  Belongings gathered and returned to the patient. Agreed to follow up in 6 weeks or sooner with any question or concerns.     Nursing Discharge Checklist:    Pneumovax screened and given, if appropriate: N/A  Influenza vaccine screened and given, if appropriate: N/A  Staples removed (): N/A  Breast milk returned: N/A. Does have Donated breast milk  Hydrogel pads sent home:NO  Birth Certificate Done: YES  Public Health Referral Made: YES    Discharged at 1220, ambulatory, with SO,

## 2021-05-09 NOTE — PLAN OF CARE
Day 3 post vaginal delivery with 2nd degree lac and spinal headache post blood patch. Patient tolerating regular diet, has had BM, up ad lana, dependent edema +2 bilaterally. Fundus firm U-1, light bleeding. Pain in back around area of epidural punctures and perineum, being managed with Tylenol/Oxy tucks/spray. Breastfeeding is going slightly better. Using nipple shield, also supplementing with donor milk. Skin on back with redness and slightly removed top layer of skin due to epidural tape. Independent in cares for self. Gaining confidence in cares for baby.

## 2021-05-09 NOTE — DISCHARGE SUMMARY
[facility] Discharge Summary    Estefania Arguello MRN# [id number]   Age: 40 year old YOB: 1980     Date of Admission:  5/5/2021  Date of Discharge::  5/9/2021  Admitting Physician:  Anyi Holder MD  Discharge Physician:  Chester Cha MD, MD     Home clinic: [facility]          Admission Diagnoses:   Hypothyroidism, unspecified type [E03.9]   OB in labor        Discharge Diagnosis:   Normal spontaneous vaginal delivery  Intrauterine pregnancy at 39 weeks gestation          Procedures:   Procedure(s): No additional procedures performed       No other procedures performed during this admission           Medications Prior to Admission:     Medications Prior to Admission   Medication Sig Dispense Refill Last Dose     levothyroxine (SYNTHROID/LEVOTHROID) 100 MCG tablet Take 1 tablet (100 mcg) by mouth daily 90 tablet 1 5/5/2021 at 0600     Prenatal Vit-Fe Fumarate-FA (PRENATAL MULTIVITAMIN W/IRON) 27-0.8 MG tablet Take 1 tablet by mouth daily   5/5/2021 at 1900     alcohol swab prep pads Use to swab area of injection/anders as directed. 100 each 3      alcohol swab prep pads Use to swab area of injection/anders as directed. 100 each 3      blood glucose (NO BRAND SPECIFIED) test strip Use to test blood sugar 4-6 times daily or as directed. To accompany: Blood Glucose Monitor Brands: per insurance. 100 strip 6      blood glucose monitoring (NO BRAND SPECIFIED) meter device kit Use to test blood sugar 4-6 times daily or as directed. Preferred blood glucose meter OR supplies to accompany: Blood Glucose Monitor Brands: per insurance. 1 kit 0      blood glucose monitoring (ONE TOUCH ULTRA 2) meter device kit USE TO TEST BLOOD SUGAR 4 6 TIMES DAILY OR AS DIRECTED        insulin pen needle (ULTICARE MICRO) 32G X 4 MM miscellaneous Use 1 pen needles daily or as directed. 100 each 3      thin (NO BRAND SPECIFIED) lancets Use with lanceting device. To accompany: Blood  Glucose Monitor Brands: per insurance. 100 each 6      Urine Glucose-Ketones Test (KETO-DIASTIX) STRP 1 strip by In Vitro route daily 50 strip 0      [DISCONTINUED] aspirin (ASA) 81 MG EC tablet Take 1 tablet (81 mg) by mouth daily   5/5/2021 at 1700     [DISCONTINUED] insulin  UNIT/ML injection Take 30 units at bedtime. Dose update for next refill. 15 mL 1 5/4/2021 at 0030             Discharge Medications:     Current Discharge Medication List      START taking these medications    Details   acetaminophen (TYLENOL) 325 MG tablet Take 2 tablets (650 mg) by mouth every 4 hours as needed for mild pain or fever (greater than or equal to 38  C /100.4  F (oral) or 38.5  C/ 101.4  F (core).)  Qty:      Associated Diagnoses: Supervision of high-risk pregnancy of elderly multigravida      ibuprofen (ADVIL/MOTRIN) 600 MG tablet Take 1 tablet (600 mg) by mouth every 6 hours as needed for other (cramping)  Qty: 30 tablet, Refills: 0    Associated Diagnoses: Supervision of high-risk pregnancy of elderly multigravida      lanolin ointment Use as needed for dry, cracked or sore nipples. No need to wash off.  Qty: 21 g, Refills: 0    Associated Diagnoses: Postpartum care and examination of lactating mother      senna-docusate (SENOKOT-S/PERICOLACE) 8.6-50 MG tablet Take 1 tablet by mouth 2 times daily as needed for constipation  Qty: 30 tablet, Refills: 0    Associated Diagnoses: Supervision of high-risk pregnancy of elderly multigravida         CONTINUE these medications which have NOT CHANGED    Details   levothyroxine (SYNTHROID/LEVOTHROID) 100 MCG tablet Take 1 tablet (100 mcg) by mouth daily  Qty: 90 tablet, Refills: 1    Associated Diagnoses: Hypothyroidism, unspecified type      Prenatal Vit-Fe Fumarate-FA (PRENATAL MULTIVITAMIN W/IRON) 27-0.8 MG tablet Take 1 tablet by mouth daily      !! alcohol swab prep pads Use to swab area of injection/anders as directed.  Qty: 100 each, Refills: 3    Associated Diagnoses: GDM,  class A2      !! alcohol swab prep pads Use to swab area of injection/anders as directed.  Qty: 100 each, Refills: 3    Associated Diagnoses: Gestational diabetes mellitus (GDM) in second trimester, gestational diabetes method of control unspecified      blood glucose (NO BRAND SPECIFIED) test strip Use to test blood sugar 4-6 times daily or as directed. To accompany: Blood Glucose Monitor Brands: per insurance.  Qty: 100 strip, Refills: 6    Associated Diagnoses: Gestational diabetes mellitus (GDM) in second trimester, gestational diabetes method of control unspecified      blood glucose monitoring (NO BRAND SPECIFIED) meter device kit Use to test blood sugar 4-6 times daily or as directed. Preferred blood glucose meter OR supplies to accompany: Blood Glucose Monitor Brands: per insurance.  Qty: 1 kit, Refills: 0    Associated Diagnoses: Gestational diabetes mellitus (GDM) in second trimester, gestational diabetes method of control unspecified      blood glucose monitoring (ONE TOUCH ULTRA 2) meter device kit USE TO TEST BLOOD SUGAR 4 6 TIMES DAILY OR AS DIRECTED      insulin pen needle (ULTICARE MICRO) 32G X 4 MM miscellaneous Use 1 pen needles daily or as directed.  Qty: 100 each, Refills: 3    Associated Diagnoses: GDM, class A2      thin (NO BRAND SPECIFIED) lancets Use with lanceting device. To accompany: Blood Glucose Monitor Brands: per insurance.  Qty: 100 each, Refills: 6    Associated Diagnoses: Gestational diabetes mellitus (GDM) in second trimester, gestational diabetes method of control unspecified      Urine Glucose-Ketones Test (KETO-DIASTIX) STRP 1 strip by In Vitro route daily  Qty: 50 strip, Refills: 0    Associated Diagnoses: Gestational diabetes       !! - Potential duplicate medications found. Please discuss with provider.      STOP taking these medications       aspirin (ASA) 81 MG EC tablet Comments:   Reason for Stopping:         insulin  UNIT/ML injection Comments:   Reason for  Stopping:                     Consultations:   No consultations were requested during this admission          Brief History of Labor:      On arrival at the birthplace her cervical exam is as follows:    FT/20%/-3     She received 2 doses of cytotec and began to contract. She then had pitocin induction, along with llamas intracervical balloon.   She received IV fentanyl x 1 with brief relief of her labor pain.   She had attempted epidural but it went in the ITN space, provided good relief of her labor pain but had to be intermittently rebolused.   She had AROM at 19:34.     She labored well and was completely dilated at 9:42 pm.  First stage of labor was 11 hrs 12 min.      She pushed very well with  viable female infant over an intact perineum at 2200.  There was a loose nuchal cord x 3 that was easily reduced. The infant was then laid on mother's abdomen, received drying and tactile stimulation.  The cord was delayed clamped x 2 and cut by the FOB who was in attendance.  There was spontaneous cry and APGARS at 1 and 5 minutes were 8 and 10 respectively.   Second stage of labor was 0 hrs and 18 minutes.      The placenta delivered spontaneously, intact with a 3VC at 2205.  Pitocin was given IV after delivery of the placenta.  Third stage of labor was 5 minutes.      Examination of the vaginal walls and perineum revealed a 1st degree wide right periurethral laceration that was not bleeding and not repaired, and a midline 2nd degree posterior perineal laceration that was repaired.  Fundal massage proved that the uterine fundus was firm.  EBL was 300 ml.       The posterior midline laceration was repaired in the usual sterile running fashion using 3-0 chromic suture.  Excellent hemostasis was obtained.  Repeat examination of the vaginal walls and perineum revealed no further lacerations and no ongoing bleeding.  Digital rectal exam was normal.             Hospital Course:   The patient's hospital course was  unremarkable.  On discharge, her pain was well controlled. Vaginal bleeding is similar to peak menstrual flow.  Voiding without difficulty.  Ambulating well and tolerating a normal diet.  No fever.  Breastfeeding and supplementing with donor milk, mother requiring a lot of instruction and encouragement.   Infant is stable.  No bowel movement yet.*  She was discharged on post-partum day #2    Post-partum hemoglobin:   Hemoglobin   Date Value Ref Range Status   05/07/2021 12.4 11.7 - 15.7 g/dL Final             Discharge Instructions and Follow-Up:   Discharge diet: Regular   Discharge activity: Lifting restricted to 10 pounds  No lifting, driving, or strenuous exercise for 2 week(s)   Discharge follow-up: Follow up with primary care provider in 6 weeks   Wound care: Drink plenty of fluids           Discharge Disposition:   Discharged to home      Attestation:  I have reviewed today's vital signs, notes, medications, labs and imaging.    Chester Cha MD, MD

## 2021-05-10 ENCOUNTER — TELEPHONE (OUTPATIENT)
Dept: FAMILY MEDICINE | Facility: CLINIC | Age: 41
End: 2021-05-10

## 2021-05-10 NOTE — TELEPHONE ENCOUNTER
Contacted patient and  informed if patient is in that terrible pain should follow OB nurse to go to ER  states they will think about it.  Elizabeth Espinoza MA

## 2021-05-10 NOTE — TELEPHONE ENCOUNTER
Patient's  called the OB floor, stating that the patient's terrible headache came back and she is in extreme pain. OB nurse told them to go to the ER, but patient lives 45 minutes away and doesn't want to go in until they talk to Dr. Holder and see what she thinks they should do. Please call patient at [phone]. We do not have a consent on file to speak to .

## 2021-05-11 ENCOUNTER — MYC MEDICAL ADVICE (OUTPATIENT)
Dept: FAMILY MEDICINE | Facility: CLINIC | Age: 41
End: 2021-05-11

## 2021-05-11 NOTE — TELEPHONE ENCOUNTER
I spoke with Estefania yesterday and we discussed symptoms management, return to ED if non-relenting pain because of risk of spinal headache and possible need for repeat blood patch.  We discussed supportive cares.   Anyi Holder MD

## 2021-05-12 NOTE — PROGRESS NOTES
"Estefania Arguello  Gender: female  : 1980  [address] [facility] [address]  [phone] (home)   Medical Record: [id number]  Primary Care Provider: Anyi Holder       [facility]   ?   Discharge Phone Call: Key Words/Key Times     How are you and the baby?\" Lissy is doing well. My spinal headache is slowly improving. Still there when I'm up but improves with lying down. Taking tylenol and ibuprofen as needed and pushing po fluids.\" All other aspects of having a baby is good.\"    How are feedings going? Pumping every 2-4 hours and bottling every 2-3 hours. She's taking about 50 mls each feeding.    Voiding & Stooling? \"lots of each.    Any questions or concerns? None    Follow-up appointment? Yesterday seen Dr ONEIL Lissy's weight up by 1/2 oz. Slightly jaundiced.      We want to provide excellent care here at [facility]. Do you have any feedback for us that would help us improve? \" I wish I wouldn't have had a student  To place my epidural. It's hard to say no when they are in the room  standing there and I'm in pain.\" It has made my experience having a baby difficult! Everyone else has been absolutely Wonderful    Call back COMMENTS:         Attempted Calls:   _________     __________  "

## 2021-07-09 ENCOUNTER — PRENATAL OFFICE VISIT (OUTPATIENT)
Dept: FAMILY MEDICINE | Facility: CLINIC | Age: 41
End: 2021-07-09
Payer: COMMERCIAL

## 2021-07-09 VITALS
HEART RATE: 76 BPM | TEMPERATURE: 98.4 F | OXYGEN SATURATION: 100 % | RESPIRATION RATE: 16 BRPM | DIASTOLIC BLOOD PRESSURE: 78 MMHG | WEIGHT: 195.8 LBS | BODY MASS INDEX: 30.73 KG/M2 | SYSTOLIC BLOOD PRESSURE: 118 MMHG | HEIGHT: 67 IN

## 2021-07-09 DIAGNOSIS — O24.419 GDM, CLASS A2: ICD-10-CM

## 2021-07-09 DIAGNOSIS — E03.9 HYPOTHYROIDISM, UNSPECIFIED TYPE: ICD-10-CM

## 2021-07-09 DIAGNOSIS — Z30.011 INITIATION OF ORAL CONTRACEPTION: ICD-10-CM

## 2021-07-09 PROBLEM — Z67.91 RH NEGATIVE STATE IN ANTEPARTUM PERIOD: Status: RESOLVED | Noted: 2020-10-20 | Resolved: 2021-07-09

## 2021-07-09 PROBLEM — O26.899 RH NEGATIVE STATE IN ANTEPARTUM PERIOD: Status: RESOLVED | Noted: 2020-10-20 | Resolved: 2021-07-09

## 2021-07-09 PROBLEM — O09.529 SUPERVISION OF HIGH-RISK PREGNANCY OF ELDERLY MULTIGRAVIDA: Status: RESOLVED | Noted: 2020-06-19 | Resolved: 2021-07-09

## 2021-07-09 PROCEDURE — 99207 PR POST PARTUM EXAM: CPT | Performed by: FAMILY MEDICINE

## 2021-07-09 RX ORDER — LEVOTHYROXINE SODIUM 100 UG/1
100 TABLET ORAL DAILY
Qty: 90 TABLET | Refills: 1 | Status: SHIPPED | OUTPATIENT
Start: 2021-07-09 | End: 2022-02-01

## 2021-07-09 RX ORDER — ACETAMINOPHEN AND CODEINE PHOSPHATE 120; 12 MG/5ML; MG/5ML
0.35 SOLUTION ORAL DAILY
Qty: 91 TABLET | Refills: 3 | Status: SHIPPED | OUTPATIENT
Start: 2021-07-09 | End: 2022-08-05 | Stop reason: ALTCHOICE

## 2021-07-09 ASSESSMENT — PAIN SCALES - GENERAL: PAINLEVEL: NO PAIN (0)

## 2021-07-09 ASSESSMENT — MIFFLIN-ST. JEOR: SCORE: 1582.83

## 2021-07-09 NOTE — PROGRESS NOTES
"Estefania is here for a 6-week postpartum checkup.    She had a  of a viable girl, weight 6 pounds 13 oz., with no complications. She did have GDM. Date of delivery was 21. Since delivery, she has been breast feeding (pumping).  She has no signs of infection, bleeding or other complications.  She is not pregnant.  We discussed contraception options and she has chosen mini pill / progesterone only pill.      Post partum tubal: No  History of Gestational Diabetes? Yes  Type of Delivery:  Vaginal  Feeding Method:  Breast  If initiated breast feeding and stopped, how long did you breast feed?:  n/a    REVIEW OF SYSTEMS:  Ears/Nose/Throat: negative  Respiratory: negative  Cardiovascular: negative  Gastrointestinal: negative  Genitourinary: negative  Musculoskeletal: negative    Neurologic: negative   Skin: negative   Endocrine:  negative  Psych:negative for postpartum depression      Past Medical History:   Diagnosis Date     Back pain      Hypothyroidism      Seasonal allergies        Past Surgical History:   Procedure Laterality Date     EYE SURGERY      \"benign tumor above right eye\"     MOUTH SURGERY      \"benign tumor under molar removed\"       Family History   Problem Relation Age of Onset     Thyroid Disease Mother      Other - See Comments Mother         B12 deficiancy     Hyperlipidemia Mother      No Known Problems Father      No Known Problems Sister      No Known Problems Brother      Skin Cancer Maternal Grandmother      Cancer Maternal Grandmother         skin     Thyroid Disease Maternal Grandmother      Emphysema Maternal Grandfather      Hypertension Paternal Grandmother      Thyroid Disease Paternal Grandmother      Emphysema Paternal Grandfather      Cancer Maternal Aunt         stomach     Cancer Maternal Uncle         stomach     ALS Paternal Uncle      Lymphoma Paternal Aunt         intravascular           EXAM:  /78   Pulse 76   Temp 98.4  F (36.9  C) (Temporal)   Resp 16   Ht 1.689 " "m (5' 6.5\")   Wt 88.8 kg (195 lb 12.8 oz)   LMP 2020 (Exact Date)   SpO2 100%   Breastfeeding Yes   BMI 31.13 kg/m    HEENT: grossly normal.  NECK: no lymphadenopathy or thyroidomegaly.  LUNGS: CTA X 2, no rales or crackles.  BACK: No spinal or CVA tenderness.  HEART: RRR without murmurs clicks or gallops.  ABDOMEN: soft, non tender, good bowel sounds, without masses rebound, guarding or tenderness.  INCISION: n/a  PELVIC:  Exam deferred, no concerns.     EXTREMITIES:  warm to touch, good pulses, no ankle edema or calf tenderness.  NEUROLOGIC: grossly normal.    ASSESSMENT:   6-week postpartum exam after .    ICD-10-CM    1. Routine postpartum follow-up  Z39.2    2. Initiation of oral contraception  Z30.011 norethindrone (MICRONOR) 0.35 MG tablet   3. Hypothyroidism, unspecified type  E03.9 TSH with free T4 reflex     levothyroxine (SYNTHROID/LEVOTHROID) 100 MCG tablet   4. GDM, class A2  O24.419 Hemoglobin A1c        PLAN:    Contraception methods discussed, see orders for progesterone only pill.  When she completes breast-feeding, she can switch to combination oral contraceptives.  Exercise encouraged  Follow up in 1 year for physical, and return in 3 months for labs including A1c and TSH.  Further Weight loss recommended  We discussed the risk of type 2 diabetes mellitus in the future.  I encouraged her to follow a healthy diabetic lifestyle as she did during her pregnancy.     Anyi Holder MD   "

## 2021-07-09 NOTE — NURSING NOTE
Health Maintenance Due   Topic Date Due     PREVENTIVE CARE VISIT  Never done     ANNUAL REVIEW OF HM ORDERS  Never done     ADVANCE CARE PLANNING  Never done     HEPATITIS C SCREENING  Never done     PAP  Never done     Jalen Landis, [facility]

## 2021-09-07 ENCOUNTER — MEDICAL CORRESPONDENCE (OUTPATIENT)
Dept: HEALTH INFORMATION MANAGEMENT | Facility: CLINIC | Age: 41
End: 2021-09-07

## 2021-10-10 ENCOUNTER — HEALTH MAINTENANCE LETTER (OUTPATIENT)
Age: 41
End: 2021-10-10

## 2021-11-08 ENCOUNTER — MEDICAL CORRESPONDENCE (OUTPATIENT)
Dept: HEALTH INFORMATION MANAGEMENT | Facility: CLINIC | Age: 41
End: 2021-11-08
Payer: COMMERCIAL

## 2022-01-29 DIAGNOSIS — E03.9 HYPOTHYROIDISM, UNSPECIFIED TYPE: ICD-10-CM

## 2022-01-31 ENCOUNTER — PRENATAL OFFICE VISIT (OUTPATIENT)
Dept: FAMILY MEDICINE | Facility: CLINIC | Age: 42
End: 2022-01-31

## 2022-01-31 DIAGNOSIS — O09.529 SUPERVISION OF HIGH-RISK PREGNANCY OF ELDERLY MULTIGRAVIDA: Primary | ICD-10-CM

## 2022-01-31 PROCEDURE — 99207 PR NO CHARGE NURSE ONLY: CPT

## 2022-01-31 NOTE — PROGRESS NOTES
OB Intake phone visit with verbal patient permission.    Estefania has not tested positive for covid.  She received Moderna 2 doses and Pfizer booster vaccines.

## 2022-02-01 RX ORDER — LEVOTHYROXINE SODIUM 100 UG/1
100 TABLET ORAL DAILY
Qty: 90 TABLET | Refills: 0 | Status: SHIPPED | OUTPATIENT
Start: 2022-02-01 | End: 2022-03-04

## 2022-02-11 ENCOUNTER — LAB (OUTPATIENT)
Dept: LAB | Facility: CLINIC | Age: 42
End: 2022-02-11
Payer: MEDICAID

## 2022-02-11 ENCOUNTER — HOSPITAL ENCOUNTER (OUTPATIENT)
Dept: ULTRASOUND IMAGING | Facility: CLINIC | Age: 42
Discharge: HOME | End: 2022-02-11
Attending: FAMILY MEDICINE | Admitting: FAMILY MEDICINE
Payer: MEDICAID

## 2022-02-11 DIAGNOSIS — E03.9 HYPOTHYROIDISM, UNSPECIFIED TYPE: Primary | ICD-10-CM

## 2022-02-11 DIAGNOSIS — O09.529 SUPERVISION OF HIGH-RISK PREGNANCY OF ELDERLY MULTIGRAVIDA: ICD-10-CM

## 2022-02-11 DIAGNOSIS — E03.9 HYPOTHYROIDISM, UNSPECIFIED TYPE: ICD-10-CM

## 2022-02-11 LAB
ABO/RH(D): NORMAL
ANTIBODY SCREEN: NEGATIVE
ERYTHROCYTE [DISTWIDTH] IN BLOOD BY AUTOMATED COUNT: 12.5 % (ref 10–15)
HBA1C MFR BLD: 5.2 % (ref 0–5.6)
HCT VFR BLD AUTO: 43.2 % (ref 35–47)
HGB BLD-MCNC: 14.5 G/DL (ref 11.7–15.7)
MCH RBC QN AUTO: 31.5 PG (ref 26.5–33)
MCHC RBC AUTO-ENTMCNC: 33.6 G/DL (ref 31.5–36.5)
MCV RBC AUTO: 94 FL (ref 78–100)
PLATELET # BLD AUTO: 309 10E3/UL (ref 150–450)
RBC # BLD AUTO: 4.61 10E6/UL (ref 3.8–5.2)
SPECIMEN EXPIRATION DATE: NORMAL
T4 FREE SERPL-MCNC: 1.07 NG/DL (ref 0.76–1.46)
T4 SERPL-MCNC: 13 UG/DL (ref 4.5–13.9)
TSH SERPL DL<=0.005 MIU/L-ACNC: 0.05 MU/L (ref 0.4–4)
WBC # BLD AUTO: 6 10E3/UL (ref 4–11)

## 2022-02-11 PROCEDURE — 85027 COMPLETE CBC AUTOMATED: CPT

## 2022-02-11 PROCEDURE — 86850 RBC ANTIBODY SCREEN: CPT

## 2022-02-11 PROCEDURE — 83036 HEMOGLOBIN GLYCOSYLATED A1C: CPT

## 2022-02-11 PROCEDURE — 87389 HIV-1 AG W/HIV-1&-2 AB AG IA: CPT

## 2022-02-11 PROCEDURE — 86901 BLOOD TYPING SEROLOGIC RH(D): CPT

## 2022-02-11 PROCEDURE — 36415 COLL VENOUS BLD VENIPUNCTURE: CPT

## 2022-02-11 PROCEDURE — 86762 RUBELLA ANTIBODY: CPT

## 2022-02-11 PROCEDURE — 87340 HEPATITIS B SURFACE AG IA: CPT

## 2022-02-11 PROCEDURE — 76801 OB US < 14 WKS SINGLE FETUS: CPT

## 2022-02-11 PROCEDURE — 86900 BLOOD TYPING SEROLOGIC ABO: CPT

## 2022-02-11 PROCEDURE — 87086 URINE CULTURE/COLONY COUNT: CPT

## 2022-02-11 PROCEDURE — 84443 ASSAY THYROID STIM HORMONE: CPT

## 2022-02-11 PROCEDURE — 86803 HEPATITIS C AB TEST: CPT

## 2022-02-11 PROCEDURE — 86780 TREPONEMA PALLIDUM: CPT

## 2022-02-11 PROCEDURE — 84439 ASSAY OF FREE THYROXINE: CPT

## 2022-02-11 NOTE — RESULT ENCOUNTER NOTE
Estefania,   Congratulations!   Here are your lab results. Your A1C test for diabetes is normal.   Your thyroid levels are probably normal for pregnancy. I have added a T4 total and will notify you when that is done. Your complete blood count (CBC) is normal.   Anyi Holder MD

## 2022-02-12 LAB
BACTERIA UR CULT: NORMAL
T PALLIDUM AB SER QL: NONREACTIVE

## 2022-02-13 LAB
HBV SURFACE AG SERPL QL IA: NONREACTIVE
HCV AB SERPL QL IA: NONREACTIVE
HIV 1+2 AB+HIV1 P24 AG SERPL QL IA: NONREACTIVE

## 2022-02-14 LAB
RUBV IGG SERPL QL IA: 8.28 INDEX
RUBV IGG SERPL QL IA: POSITIVE

## 2022-02-15 DIAGNOSIS — O36.8390 ANTEPARTUM FETAL BRADYCARDIA AFFECTING CARE OF MOTHER: Primary | ICD-10-CM

## 2022-02-15 DIAGNOSIS — O03.9 MISCARRIAGE: ICD-10-CM

## 2022-02-16 NOTE — RESULT ENCOUNTER NOTE
Estefania, the rest of your labs including your thyroid level are normal for this stage in pregnancy.   Anyi Holder MD

## 2022-02-16 NOTE — RESULT ENCOUNTER NOTE
Estefania, here is your ultrasound report. The baby is less far along than initially thought. I would like to repeat an ultrasound in 2 weeks to make sure there is still normal growth. I am putting in an order, and you can call for an appointment for repeat ultrasound. This will be right around the time of your upcoming appointment.  Anyi Holder MD

## 2022-03-04 ENCOUNTER — HOSPITAL ENCOUNTER (OUTPATIENT)
Dept: ULTRASOUND IMAGING | Facility: CLINIC | Age: 42
Discharge: HOME | End: 2022-03-04
Attending: FAMILY MEDICINE | Admitting: FAMILY MEDICINE
Payer: MEDICAID

## 2022-03-04 ENCOUNTER — PRENATAL OFFICE VISIT (OUTPATIENT)
Dept: FAMILY MEDICINE | Facility: CLINIC | Age: 42
End: 2022-03-04
Payer: MEDICAID

## 2022-03-04 VITALS
BODY MASS INDEX: 32.12 KG/M2 | WEIGHT: 202 LBS | DIASTOLIC BLOOD PRESSURE: 80 MMHG | TEMPERATURE: 97.8 F | OXYGEN SATURATION: 98 % | SYSTOLIC BLOOD PRESSURE: 122 MMHG | HEART RATE: 94 BPM

## 2022-03-04 DIAGNOSIS — O02.1 MISSED ABORTION: Primary | ICD-10-CM

## 2022-03-04 DIAGNOSIS — O36.8390 ANTEPARTUM FETAL BRADYCARDIA AFFECTING CARE OF MOTHER: ICD-10-CM

## 2022-03-04 DIAGNOSIS — Z01.818 PREOP GENERAL PHYSICAL EXAM: ICD-10-CM

## 2022-03-04 DIAGNOSIS — E03.9 HYPOTHYROIDISM, UNSPECIFIED TYPE: ICD-10-CM

## 2022-03-04 LAB
ABO/RH(D): NORMAL
ANTIBODY SCREEN: NEGATIVE
B-HCG SERPL-ACNC: ABNORMAL IU/L (ref 0–5)
ERYTHROCYTE [DISTWIDTH] IN BLOOD BY AUTOMATED COUNT: 12.1 % (ref 10–15)
HCT VFR BLD AUTO: 42.2 % (ref 35–47)
HGB BLD-MCNC: 14.3 G/DL (ref 11.7–15.7)
MCH RBC QN AUTO: 31.6 PG (ref 26.5–33)
MCHC RBC AUTO-ENTMCNC: 33.9 G/DL (ref 31.5–36.5)
MCV RBC AUTO: 93 FL (ref 78–100)
PLATELET # BLD AUTO: 280 10E3/UL (ref 150–450)
RBC # BLD AUTO: 4.52 10E6/UL (ref 3.8–5.2)
SARS-COV-2 RNA RESP QL NAA+PROBE: NEGATIVE
SPECIMEN EXPIRATION DATE: NORMAL
WBC # BLD AUTO: 5.6 10E3/UL (ref 4–11)

## 2022-03-04 PROCEDURE — 76801 OB US < 14 WKS SINGLE FETUS: CPT

## 2022-03-04 PROCEDURE — 36415 COLL VENOUS BLD VENIPUNCTURE: CPT | Performed by: FAMILY MEDICINE

## 2022-03-04 PROCEDURE — 86900 BLOOD TYPING SEROLOGIC ABO: CPT | Performed by: FAMILY MEDICINE

## 2022-03-04 PROCEDURE — U0003 INFECTIOUS AGENT DETECTION BY NUCLEIC ACID (DNA OR RNA); SEVERE ACUTE RESPIRATORY SYNDROME CORONAVIRUS 2 (SARS-COV-2) (CORONAVIRUS DISEASE [COVID-19]), AMPLIFIED PROBE TECHNIQUE, MAKING USE OF HIGH THROUGHPUT TECHNOLOGIES AS DESCRIBED BY CMS-2020-01-R: HCPCS | Performed by: FAMILY MEDICINE

## 2022-03-04 PROCEDURE — 85027 COMPLETE CBC AUTOMATED: CPT | Performed by: FAMILY MEDICINE

## 2022-03-04 PROCEDURE — 86901 BLOOD TYPING SEROLOGIC RH(D): CPT | Performed by: FAMILY MEDICINE

## 2022-03-04 PROCEDURE — 99214 OFFICE O/P EST MOD 30 MIN: CPT | Performed by: FAMILY MEDICINE

## 2022-03-04 PROCEDURE — 86850 RBC ANTIBODY SCREEN: CPT | Performed by: FAMILY MEDICINE

## 2022-03-04 PROCEDURE — 84702 CHORIONIC GONADOTROPIN TEST: CPT | Performed by: FAMILY MEDICINE

## 2022-03-04 PROCEDURE — U0005 INFEC AGEN DETEC AMPLI PROBE: HCPCS | Performed by: FAMILY MEDICINE

## 2022-03-04 RX ORDER — LEVOTHYROXINE SODIUM 100 UG/1
100 TABLET ORAL DAILY
Qty: 90 TABLET | Refills: 3 | Status: SHIPPED | OUTPATIENT
Start: 2022-03-04 | End: 2022-11-20

## 2022-03-04 NOTE — PROGRESS NOTES
[address] [address]  Phone: [phone]  Fax: [phone]  Primary Provider: Anyi Alva  Pre-op Performing Provider: ANYI ALVA    PREOPERATIVE EVALUATION:  Today's date: 3/4/2022    Estefania Arguello is a 41 year old female who presents for a preoperative evaluation.    Surgical Information:  Surgery/Procedure: D&C possible  Surgery Location: [facility]  Surgeon: TBD  Surgery Date: TBD  Time of Surgery: TBD  Where patient plans to recover: At home with family  Fax number for surgical facility: Note does not need to be faxed, will be available electronically in Epic.    Type of Anesthesia Anticipated: General    Assessment & Plan     The proposed surgical procedure is considered LOW risk.      ICD-10-CM    1. Missed   O02.1 HCG Quantitative Pregnancy, Blood (BMM025)     ABO/Rh type and screen     CBC with platelets     HCG Quantitative Pregnancy, Blood (FZQ435)     ABO/Rh type and screen     CBC with platelets     Asymptomatic COVID-19 Virus (Coronavirus) by PCR Nose     Asymptomatic COVID-19 Virus (Coronavirus) by PCR Nose     HCG Quantitative Pregnancy, Blood (ABW056)   2. Preop general physical exam  Z01.818    3. Hypothyroidism, unspecified type  E03.9 levothyroxine (SYNTHROID/LEVOTHROID) 100 MCG tablet          Risks and Recommendations:  The patient has the following additional risks and recommendations for perioperative complications:   - No identified additional risk factors other than previously addressed    Medication Instructions:  Patient is to take all scheduled medications on the day of surgery    RECOMMENDATION:  APPROVAL GIVEN to proceed with proposed procedure, without further diagnostic evaluation.    30 minutes spent on the date of the encounter doing chart review, history and exam, documentation and further activities per the note      Subjective     HPI related to  upcoming procedure: Patient diagnosed with spontaneous , came in for routine 1st OB visit and no heartbeat detected.     1. No - Have you ever had a heart attack or stroke?  2. No - Have you ever had surgery on your heart or blood vessels, such as a stent, coronary (heart) bypass, or surgery on an artery in the head, neck, heart, or legs?  3. No - Do you have chest pain when you are physically active?  4. No - Do you have a history of heart failure?  5. No - Do you currently have a cold, bronchitis, or symptoms of other respiratory (head and chest) infections?  6. No - Do you have a cough, shortness of breath, or wheezing?  7. No - Do you or anyone in your family have a history of blood clots?  8. No - Do you or anyone in your family have a serious bleeding problem, such as long-lasting bleeding after surgeries or cuts?  9. No - Have you ever had anemia or been told to take iron pills?  10. No - Have you had any abnormal blood loss such as black, tarry or bloody stools, or abnormal vaginal bleeding?  11. No - Have you ever had a blood transfusion?  12. Yes - Are you willing to have a blood transfusion if it is medically needed before, during, or after your surgery?  13. No - Have you or anyone in your family ever had problems with anesthesia (sedation for surgery)?  14. No - Do you have sleep apnea, excessive snoring, or daytime drowsiness?   15. No - Do you have any artifical heart valves or other implanted medical devices, such as a pacemaker, defibrillator, or continuous glucose monitor?  16. No - Do you have any artifical joints?  17. No - Are you allergic to latex?  18. Yes - Is there any chance that you may be pregnant? Miscarriage    Health Care Directive:  Patient does not have a Health Care Directive or Living Will: FULL CODE    Preoperative Review of :   reviewed - controlled substances reflected in medication list.    Status of Chronic Conditions:  HYPOTHYROIDISM - Patient has a longstanding  "history of chronic Hypothyroidism. She reports hair loss. She has otherwise been doing well, noting no tremor, insomnia, or changes in skin texture. Continues to take medications as directed, without adverse reactions or side effects. Last TSH   Lab Results   Component Value Date    TSH 0.05 (L) 02/11/2022    normal for pregnancy.     Review of Systems  CONSTITUTIONAL: NEGATIVE for fever, chills, change in weight  INTEGUMENTARY/SKIN: NEGATIVE for worrisome rashes, moles or lesions  EYES: NEGATIVE for vision changes or irritation  ENT/MOUTH: NEGATIVE for ear, mouth and throat problems  RESP: NEGATIVE for significant cough or SOB  CV: NEGATIVE for chest pain, palpitations or peripheral edema  GI: NEGATIVE for nausea, abdominal pain, heartburn, or change in bowel habits  : NEGATIVE for frequency, dysuria, or hematuria  MUSCULOSKELETAL: NEGATIVE for significant arthralgias or myalgia  NEURO: NEGATIVE for weakness, dizziness or paresthesias  ENDOCRINE: NEGATIVE for temperature intolerance, skin/hair changes  HEME: NEGATIVE for bleeding problems  PSYCHIATRIC: NEGATIVE for changes in mood or affect    Patient Active Problem List    Diagnosis Date Noted     GDM, class A2 02/19/2021     Priority: Medium     Hypothyroidism, unspecified type 10/20/2020     Priority: Medium      Past Medical History:   Diagnosis Date     Back pain      Hypothyroidism      Seasonal allergies      Past Surgical History:   Procedure Laterality Date     EYE SURGERY      \"benign tumor above right eye\"     MOUTH SURGERY      \"benign tumor under molar removed\"     Current Outpatient Medications   Medication Sig Dispense Refill     levothyroxine (SYNTHROID/LEVOTHROID) 100 MCG tablet Take 1 tablet (100 mcg) by mouth daily 90 tablet 3     Prenatal Vit-Fe Fumarate-FA (PRENATAL MULTIVITAMIN W/IRON) 27-0.8 MG tablet Take 1 tablet by mouth daily       alcohol swab prep pads Use to swab area of injection/anders as directed. (Patient not taking: Reported on " 7/9/2021) 100 each 3     alcohol swab prep pads Use to swab area of injection/anders as directed. (Patient not taking: Reported on 1/31/2022) 100 each 3     blood glucose (NO BRAND SPECIFIED) test strip Use to test blood sugar 4-6 times daily or as directed. To accompany: Blood Glucose Monitor Brands: per insurance. (Patient not taking: Reported on 7/9/2021) 100 strip 6     blood glucose monitoring (NO BRAND SPECIFIED) meter device kit Use to test blood sugar 4-6 times daily or as directed. Preferred blood glucose meter OR supplies to accompany: Blood Glucose Monitor Brands: per insurance. (Patient not taking: Reported on 7/9/2021) 1 kit 0     blood glucose monitoring (ONE TOUCH ULTRA 2) meter device kit  (Patient not taking: Reported on 1/31/2022)       insulin pen needle (ULTICARE MICRO) 32G X 4 MM miscellaneous Use 1 pen needles daily or as directed. (Patient not taking: Reported on 7/9/2021) 100 each 3     norethindrone (MICRONOR) 0.35 MG tablet Take 1 tablet (0.35 mg) by mouth daily 91 tablet 3     thin (NO BRAND SPECIFIED) lancets Use with lanceting device. To accompany: Blood Glucose Monitor Brands: per insurance. (Patient not taking: Reported on 7/9/2021) 100 each 6     Urine Glucose-Ketones Test (KETO-DIASTIX) STRP 1 strip by In Vitro route daily (Patient not taking: Reported on 7/9/2021) 50 strip 0       No Known Allergies     Social History     Tobacco Use     Smoking status: Former Smoker     Quit date: 07/2018     Years since quitting: 3.6     Smokeless tobacco: Former User   Substance Use Topics     Alcohol use: Not Currently     Family History   Problem Relation Age of Onset     Diabetes Mother      Thyroid Disease Mother      Other - See Comments Mother         B12 deficiancy     Hyperlipidemia Mother      No Known Problems Father      No Known Problems Sister      No Known Problems Brother      Skin Cancer Maternal Grandmother      Cancer Maternal Grandmother         skin     Thyroid Disease Maternal  Grandmother      Emphysema Maternal Grandfather      Hypertension Paternal Grandmother      Thyroid Disease Paternal Grandmother      Emphysema Paternal Grandfather      No Known Problems Daughter      Cancer Maternal Aunt         stomach     Cancer Maternal Uncle         stomach     Lymphoma Paternal Aunt         intravascular     ALS Paternal Uncle      History   Drug Use Unknown         Objective     /80   Pulse 94   Temp 97.8  F (36.6  C) (Temporal)   Wt 91.6 kg (202 lb)   LMP 11/29/2021   SpO2 98%   BMI 32.12 kg/m      Physical Exam    GENERAL APPEARANCE: healthy, alert and no distress     EYES: EOMI, PERRL     HENT: ear canals and TM's normal and nose and mouth without ulcers or lesions     NECK: no adenopathy, no asymmetry, masses, or scars and thyroid normal to palpation     RESP: lungs clear to auscultation - no rales, rhonchi or wheezes     CV: regular rates and rhythm, normal S1 S2, no S3 or S4 and no murmur, click or rub     ABDOMEN:  soft, mildly tender over suprapubic area, no HSM or masses and bowel sounds normal     MS: extremities normal- no gross deformities noted, no evidence of inflammation in joints, FROM in all extremities.     NEURO: Normal strength and tone, sensory exam grossly normal, mentation intact and speech normal     PSYCH: mentation appears normal. and affect normal, appropriately sad.     LYMPHATICS: No cervical adenopathy    Recent Labs   Lab Test 03/04/22  1204 02/11/22  1051 05/05/21  2030 04/02/21  1044 01/22/21  1056   HGB 14.3 14.5   < > 12.7 12.2    309   < > 246 278   NA  --   --   --  136 136   POTASSIUM  --   --   --  4.1 3.6   CR  --   --   --  0.54 0.54   A1C  --  5.2  --   --   --     < > = values in this interval not displayed.        Diagnostics:  Recent Results (from the past 24 hour(s))   HCG Quantitative Pregnancy, Blood (UTC692)    Collection Time: 03/04/22 12:04 PM   Result Value Ref Range    hCG Quantitative 16,162 (H) 0 - 5 IU/L   CBC with  platelets    Collection Time: 03/04/22 12:04 PM   Result Value Ref Range    WBC Count 5.6 4.0 - 11.0 10e3/uL    RBC Count 4.52 3.80 - 5.20 10e6/uL    Hemoglobin 14.3 11.7 - 15.7 g/dL    Hematocrit 42.2 35.0 - 47.0 %    MCV 93 78 - 100 fL    MCH 31.6 26.5 - 33.0 pg    MCHC 33.9 31.5 - 36.5 g/dL    RDW 12.1 10.0 - 15.0 %    Platelet Count 280 150 - 450 10e3/uL   Adult Type and Screen    Collection Time: 03/04/22 12:04 PM   Result Value Ref Range    ABO/RH(D) A NEG     Antibody Screen Negative Negative    SPECIMEN EXPIRATION DATE [id number]       No EKG required for low risk surgery (cataract, skin procedure, breast biopsy, etc).    Revised Cardiac Risk Index (RCRI):  The patient has the following serious cardiovascular risks for perioperative complications:   - No serious cardiac risks = 0 points     RCRI Interpretation: 0 points: Class I (very low risk - 0.4% complication rate)           Signed Electronically by: Anyi Holder MD  Copy of this evaluation report is provided to requesting physician.

## 2022-03-04 NOTE — PATIENT INSTRUCTIONS
Preparing for Your Surgery  Getting started  A nurse will call you to review your health history and instructions. They will give you an arrival time based on your scheduled surgery time. Please be ready to share:    Your doctor's clinic name and phone number    Your medical, surgical and anesthesia history    A list of allergies and sensitivities    A list of medicines, including herbal treatments and over-the-counter drugs    Whether the patient has a legal guardian (ask how to send us the papers in advance)  Please tell us if you're pregnant--or if there's any chance you might be pregnant. Some surgeries may injure a fetus (unborn baby), so they require a pregnancy test. Surgeries that are safe for a fetus don't always need a test, and you can choose whether to have one.   If you have a child who's having surgery, please ask for a copy of Preparing for Your Child's Surgery.    Preparing for surgery    Within 30 days of surgery: Have a pre-op exam (sometimes called an H&P, or History and Physical). This can be done at a clinic or pre-operative center.  ? If you're having a , you may not need this exam. Talk to your care team.    At your pre-op exam, talk to your care team about all medicines you take. If you need to stop any medicines before surgery, ask when to start taking them again.  ? We do this for your safety. Many medicines can make you bleed too much during surgery. Some change how well surgery (anesthesia) drugs work.    Call your insurance company to let them know you're having surgery. (If you don't have insurance, call [phone].)    Call your clinic if there's any change in your health. This includes signs of a cold or flu (sore throat, runny nose, cough, rash, fever). It also includes a scrape or scratch near the surgery site.    If you have questions on the day of surgery, call your hospital or surgery center.  COVID testing  You may need to be tested for COVID-19 before having  surgery. If so, your surgical team will give you instructions for scheduling this test, separate from your preoperative history and physical.  Eating and drinking guidelines  For your safety: Unless your surgeon tells you otherwise, follow the guidelines below.    Eat and drink as usual until 8 hours before surgery. After that, no food or milk.    Drink clear liquids until 2 hours before surgery. These are liquids you can see through, like water, Gatorade and Propel Water. You may also have black coffee and tea (no cream or milk).    Nothing by mouth within 2 hours of surgery. This includes gum, candy and breath mints.    If you drink alcohol: Stop drinking it the night before surgery.    If your care team tells you to take medicine on the morning of surgery, it's okay to take it with a sip of water.  Preventing infection    Shower or bathe the night before and morning of your surgery. Follow the instructions your clinic gave you. (If no instructions, use regular soap.)    Don't shave or clip hair near your surgery site. We'll remove the hair if needed.    Don't smoke or vape the morning of surgery. You may chew nicotine gum up to 2 hours before surgery. A nicotine patch is okay.  ? Note: Some surgeries require you to completely quit smoking and nicotine. Check with your surgeon.    Your care team will make every effort to keep you safe from infection. We will:  ? Clean our hands often with soap and water (or an alcohol-based hand rub).  ? Clean the skin at your surgery site with a special soap that kills germs.  ? Give you a special gown to keep you warm. (Cold raises the risk of infection.)  ? Wear special hair covers, masks, gowns and gloves during surgery.  ? Give antibiotic medicine, if prescribed. Not all surgeries need antibiotics.  What to bring on the day of surgery    Photo ID and insurance card    Copy of your health care directive, if you have one    Glasses and hearing aides (bring cases)  ? You can't  wear contacts during surgery    Inhaler and eye drops, if you use them (tell us about these when you arrive)    CPAP machine or breathing device, if you use them    A few personal items, if spending the night    If you have . . .  ? A pacemaker, ICD (cardiac defibrillator) or other implant: Bring the ID card.  ? An implanted stimulator: Bring the remote control.  ? A legal guardian: Bring a copy of the certified (court-stamped) guardianship papers.  Please remove any jewelry, including body piercings. Leave jewelry and other valuables at home.  If you're going home the day of surgery    You must have a responsible adult drive you home. They should stay with you overnight as well.    If you don't have someone to stay with you, and you aren't safe to go home alone, we may keep you overnight. Insurance often won't pay for this.  After surgery  If it's hard to control your pain or you need more pain medicine, please call your surgeon's office.  Questions?   If you have any questions for your care team, list them here: _________________________________________________________________________________________________________________________________________________________________________ ____________________________________ ____________________________________ ____________________________________  For informational purposes only. Not to replace the advice of your health care provider. Copyright   2003, 2019 [facility]. All rights reserved. Clinically reviewed by Carrie Jimenez MD. [employer] [id number] - REV 07/21.

## 2022-03-04 NOTE — RESULT ENCOUNTER NOTE
Estefania, here are your lab results.  Your hormone level is not where I would expect it to be, but still clearly not 0.  We will recheck this as discussed.  I have another order in for you to have a future check whenever you come back in for treatment.  Your full blood count is normal.  Anyi Holder MD

## 2022-03-04 NOTE — PROGRESS NOTES
SUBJECTIVE:  Estefania comes in today for first OB visit.  However she had a routine dating ultrasound just prior to this appointment and the ultrasound unfortunately showed a nonviable fetus.  She has not had any cramping out of the ordinary and no bleeding.  She has 1 prior miscarriage in 2020, treated medically with Cytotec.  She then subsequently had a full-term pregnancy.  She is estimated at 9 weeks and 2 days gestation today based on prior ultrasound.    OBJECTIVE:  /80   Pulse 94   Temp 97.8  F (36.6  C) (Temporal)   Wt 91.6 kg (202 lb)   LMP 2021   SpO2 98%   BMI 32.12 kg/m     Vitals noted.  Patient alert, oriented, and in no acute distress.   See separate note for exam.     ASSESSMENT/PLAN:    ICD-10-CM    1. Missed   O02.1 HCG Quantitative Pregnancy, Blood (EEA716)     ABO/Rh type and screen     CBC with platelets     HCG Quantitative Pregnancy, Blood (GMM341)     ABO/Rh type and screen     CBC with platelets     Asymptomatic COVID-19 Virus (Coronavirus) by PCR Nose     Asymptomatic COVID-19 Virus (Coronavirus) by PCR Nose     HCG Quantitative Pregnancy, Blood (QFQ045)   2. Preop general physical exam  Z01.818    3. Hypothyroidism, unspecified type  E03.9 levothyroxine (SYNTHROID/LEVOTHROID) 100 MCG tablet        We had a lengthy talk about spontaneous miscarriage.  We also discussed treatment options.  We listed the option of doing nothing, watchful waiting for her just past the spontaneously.  We discussed the option of medication therapy with Cytotec and the option of surgical therapy with D&C.  She is undecided at this time.  I asked her to give it some thought through the weekend and consider either option #2 or #3.  Because this may have been up to 3 weeks already based on the fetus size, I prefer she not do just watchful waiting at this time.  She is inclined to agree.  She is leaning toward D&C but wants to think it over.    She also had recent thyroid levels checked  and just needed refills on her medication today so that was done.    I did perform preop today as well as a Covid swab just in case we do proceed with surgical intervention through the weekend or early next week.  She knows how to contact me on the weekend and will present to the emergency room if needed for severe symptoms of bleeding, pain, fever or other issues.    Anyi Holder MD

## 2022-03-04 NOTE — H&P (VIEW-ONLY)
[address] [address]  Phone: [phone]  Fax: [phone]  Primary Provider: Anyi Alva  Pre-op Performing Provider: ANYI ALVA    PREOPERATIVE EVALUATION:  Today's date: 3/4/2022    Estefania Arguello is a 41 year old female who presents for a preoperative evaluation.    Surgical Information:  Surgery/Procedure: D&C possible  Surgery Location: [facility]  Surgeon: TBD  Surgery Date: TBD  Time of Surgery: TBD  Where patient plans to recover: At home with family  Fax number for surgical facility: Note does not need to be faxed, will be available electronically in Epic.    Type of Anesthesia Anticipated: General    Assessment & Plan     The proposed surgical procedure is considered LOW risk.      ICD-10-CM    1. Missed   O02.1 HCG Quantitative Pregnancy, Blood (DUD307)     ABO/Rh type and screen     CBC with platelets     HCG Quantitative Pregnancy, Blood (GZE930)     ABO/Rh type and screen     CBC with platelets     Asymptomatic COVID-19 Virus (Coronavirus) by PCR Nose     Asymptomatic COVID-19 Virus (Coronavirus) by PCR Nose     HCG Quantitative Pregnancy, Blood (BFP743)   2. Preop general physical exam  Z01.818    3. Hypothyroidism, unspecified type  E03.9 levothyroxine (SYNTHROID/LEVOTHROID) 100 MCG tablet          Risks and Recommendations:  The patient has the following additional risks and recommendations for perioperative complications:   - No identified additional risk factors other than previously addressed    Medication Instructions:  Patient is to take all scheduled medications on the day of surgery    RECOMMENDATION:  APPROVAL GIVEN to proceed with proposed procedure, without further diagnostic evaluation.    30 minutes spent on the date of the encounter doing chart review, history and exam, documentation and further activities per the note      Subjective     HPI related to  upcoming procedure: Patient diagnosed with spontaneous , came in for routine 1st OB visit and no heartbeat detected.     1. No - Have you ever had a heart attack or stroke?  2. No - Have you ever had surgery on your heart or blood vessels, such as a stent, coronary (heart) bypass, or surgery on an artery in the head, neck, heart, or legs?  3. No - Do you have chest pain when you are physically active?  4. No - Do you have a history of heart failure?  5. No - Do you currently have a cold, bronchitis, or symptoms of other respiratory (head and chest) infections?  6. No - Do you have a cough, shortness of breath, or wheezing?  7. No - Do you or anyone in your family have a history of blood clots?  8. No - Do you or anyone in your family have a serious bleeding problem, such as long-lasting bleeding after surgeries or cuts?  9. No - Have you ever had anemia or been told to take iron pills?  10. No - Have you had any abnormal blood loss such as black, tarry or bloody stools, or abnormal vaginal bleeding?  11. No - Have you ever had a blood transfusion?  12. Yes - Are you willing to have a blood transfusion if it is medically needed before, during, or after your surgery?  13. No - Have you or anyone in your family ever had problems with anesthesia (sedation for surgery)?  14. No - Do you have sleep apnea, excessive snoring, or daytime drowsiness?   15. No - Do you have any artifical heart valves or other implanted medical devices, such as a pacemaker, defibrillator, or continuous glucose monitor?  16. No - Do you have any artifical joints?  17. No - Are you allergic to latex?  18. Yes - Is there any chance that you may be pregnant? Miscarriage    Health Care Directive:  Patient does not have a Health Care Directive or Living Will: FULL CODE    Preoperative Review of :   reviewed - controlled substances reflected in medication list.    Status of Chronic Conditions:  HYPOTHYROIDISM - Patient has a longstanding  "history of chronic Hypothyroidism. She reports hair loss. She has otherwise been doing well, noting no tremor, insomnia, or changes in skin texture. Continues to take medications as directed, without adverse reactions or side effects. Last TSH   Lab Results   Component Value Date    TSH 0.05 (L) 02/11/2022    normal for pregnancy.     Review of Systems  CONSTITUTIONAL: NEGATIVE for fever, chills, change in weight  INTEGUMENTARY/SKIN: NEGATIVE for worrisome rashes, moles or lesions  EYES: NEGATIVE for vision changes or irritation  ENT/MOUTH: NEGATIVE for ear, mouth and throat problems  RESP: NEGATIVE for significant cough or SOB  CV: NEGATIVE for chest pain, palpitations or peripheral edema  GI: NEGATIVE for nausea, abdominal pain, heartburn, or change in bowel habits  : NEGATIVE for frequency, dysuria, or hematuria  MUSCULOSKELETAL: NEGATIVE for significant arthralgias or myalgia  NEURO: NEGATIVE for weakness, dizziness or paresthesias  ENDOCRINE: NEGATIVE for temperature intolerance, skin/hair changes  HEME: NEGATIVE for bleeding problems  PSYCHIATRIC: NEGATIVE for changes in mood or affect    Patient Active Problem List    Diagnosis Date Noted     GDM, class A2 02/19/2021     Priority: Medium     Hypothyroidism, unspecified type 10/20/2020     Priority: Medium      Past Medical History:   Diagnosis Date     Back pain      Hypothyroidism      Seasonal allergies      Past Surgical History:   Procedure Laterality Date     EYE SURGERY      \"benign tumor above right eye\"     MOUTH SURGERY      \"benign tumor under molar removed\"     Current Outpatient Medications   Medication Sig Dispense Refill     levothyroxine (SYNTHROID/LEVOTHROID) 100 MCG tablet Take 1 tablet (100 mcg) by mouth daily 90 tablet 3     Prenatal Vit-Fe Fumarate-FA (PRENATAL MULTIVITAMIN W/IRON) 27-0.8 MG tablet Take 1 tablet by mouth daily       alcohol swab prep pads Use to swab area of injection/anders as directed. (Patient not taking: Reported on " 7/9/2021) 100 each 3     alcohol swab prep pads Use to swab area of injection/anders as directed. (Patient not taking: Reported on 1/31/2022) 100 each 3     blood glucose (NO BRAND SPECIFIED) test strip Use to test blood sugar 4-6 times daily or as directed. To accompany: Blood Glucose Monitor Brands: per insurance. (Patient not taking: Reported on 7/9/2021) 100 strip 6     blood glucose monitoring (NO BRAND SPECIFIED) meter device kit Use to test blood sugar 4-6 times daily or as directed. Preferred blood glucose meter OR supplies to accompany: Blood Glucose Monitor Brands: per insurance. (Patient not taking: Reported on 7/9/2021) 1 kit 0     blood glucose monitoring (ONE TOUCH ULTRA 2) meter device kit  (Patient not taking: Reported on 1/31/2022)       insulin pen needle (ULTICARE MICRO) 32G X 4 MM miscellaneous Use 1 pen needles daily or as directed. (Patient not taking: Reported on 7/9/2021) 100 each 3     norethindrone (MICRONOR) 0.35 MG tablet Take 1 tablet (0.35 mg) by mouth daily 91 tablet 3     thin (NO BRAND SPECIFIED) lancets Use with lanceting device. To accompany: Blood Glucose Monitor Brands: per insurance. (Patient not taking: Reported on 7/9/2021) 100 each 6     Urine Glucose-Ketones Test (KETO-DIASTIX) STRP 1 strip by In Vitro route daily (Patient not taking: Reported on 7/9/2021) 50 strip 0       No Known Allergies     Social History     Tobacco Use     Smoking status: Former Smoker     Quit date: 07/2018     Years since quitting: 3.6     Smokeless tobacco: Former User   Substance Use Topics     Alcohol use: Not Currently     Family History   Problem Relation Age of Onset     Diabetes Mother      Thyroid Disease Mother      Other - See Comments Mother         B12 deficiancy     Hyperlipidemia Mother      No Known Problems Father      No Known Problems Sister      No Known Problems Brother      Skin Cancer Maternal Grandmother      Cancer Maternal Grandmother         skin     Thyroid Disease Maternal  Grandmother      Emphysema Maternal Grandfather      Hypertension Paternal Grandmother      Thyroid Disease Paternal Grandmother      Emphysema Paternal Grandfather      No Known Problems Daughter      Cancer Maternal Aunt         stomach     Cancer Maternal Uncle         stomach     Lymphoma Paternal Aunt         intravascular     ALS Paternal Uncle      History   Drug Use Unknown         Objective     /80   Pulse 94   Temp 97.8  F (36.6  C) (Temporal)   Wt 91.6 kg (202 lb)   LMP 11/29/2021   SpO2 98%   BMI 32.12 kg/m      Physical Exam    GENERAL APPEARANCE: healthy, alert and no distress     EYES: EOMI, PERRL     HENT: ear canals and TM's normal and nose and mouth without ulcers or lesions     NECK: no adenopathy, no asymmetry, masses, or scars and thyroid normal to palpation     RESP: lungs clear to auscultation - no rales, rhonchi or wheezes     CV: regular rates and rhythm, normal S1 S2, no S3 or S4 and no murmur, click or rub     ABDOMEN:  soft, mildly tender over suprapubic area, no HSM or masses and bowel sounds normal     MS: extremities normal- no gross deformities noted, no evidence of inflammation in joints, FROM in all extremities.     NEURO: Normal strength and tone, sensory exam grossly normal, mentation intact and speech normal     PSYCH: mentation appears normal. and affect normal, appropriately sad.     LYMPHATICS: No cervical adenopathy    Recent Labs   Lab Test 03/04/22  1204 02/11/22  1051 05/05/21  2030 04/02/21  1044 01/22/21  1056   HGB 14.3 14.5   < > 12.7 12.2    309   < > 246 278   NA  --   --   --  136 136   POTASSIUM  --   --   --  4.1 3.6   CR  --   --   --  0.54 0.54   A1C  --  5.2  --   --   --     < > = values in this interval not displayed.        Diagnostics:  Recent Results (from the past 24 hour(s))   HCG Quantitative Pregnancy, Blood (NPH129)    Collection Time: 03/04/22 12:04 PM   Result Value Ref Range    hCG Quantitative 16,162 (H) 0 - 5 IU/L   CBC with  platelets    Collection Time: 03/04/22 12:04 PM   Result Value Ref Range    WBC Count 5.6 4.0 - 11.0 10e3/uL    RBC Count 4.52 3.80 - 5.20 10e6/uL    Hemoglobin 14.3 11.7 - 15.7 g/dL    Hematocrit 42.2 35.0 - 47.0 %    MCV 93 78 - 100 fL    MCH 31.6 26.5 - 33.0 pg    MCHC 33.9 31.5 - 36.5 g/dL    RDW 12.1 10.0 - 15.0 %    Platelet Count 280 150 - 450 10e3/uL   Adult Type and Screen    Collection Time: 03/04/22 12:04 PM   Result Value Ref Range    ABO/RH(D) A NEG     Antibody Screen Negative Negative    SPECIMEN EXPIRATION DATE [id number]       No EKG required for low risk surgery (cataract, skin procedure, breast biopsy, etc).    Revised Cardiac Risk Index (RCRI):  The patient has the following serious cardiovascular risks for perioperative complications:   - No serious cardiac risks = 0 points     RCRI Interpretation: 0 points: Class I (very low risk - 0.4% complication rate)           Signed Electronically by: Anyi Holder MD  Copy of this evaluation report is provided to requesting physician.

## 2022-03-07 RX ORDER — ACETAMINOPHEN 325 MG/1
975 TABLET ORAL ONCE
Status: CANCELLED | OUTPATIENT
Start: 2022-03-07 | End: 2022-03-07

## 2022-03-08 ENCOUNTER — ANESTHESIA EVENT (OUTPATIENT)
Dept: SURGERY | Facility: CLINIC | Age: 42
End: 2022-03-08
Payer: MEDICAID

## 2022-03-08 ENCOUNTER — ANESTHESIA (OUTPATIENT)
Dept: SURGERY | Facility: CLINIC | Age: 42
End: 2022-03-08
Payer: MEDICAID

## 2022-03-08 ENCOUNTER — HOSPITAL ENCOUNTER (OUTPATIENT)
Facility: CLINIC | Age: 42
Discharge: HOME | End: 2022-03-08
Attending: OBSTETRICS & GYNECOLOGY | Admitting: OBSTETRICS & GYNECOLOGY
Payer: MEDICAID

## 2022-03-08 VITALS
OXYGEN SATURATION: 98 % | HEART RATE: 71 BPM | TEMPERATURE: 98.6 F | SYSTOLIC BLOOD PRESSURE: 105 MMHG | RESPIRATION RATE: 16 BRPM | DIASTOLIC BLOOD PRESSURE: 53 MMHG | BODY MASS INDEX: 32.47 KG/M2 | HEIGHT: 66 IN | WEIGHT: 202 LBS

## 2022-03-08 DIAGNOSIS — Z98.890 POST-OPERATIVE STATE: Primary | ICD-10-CM

## 2022-03-08 LAB
ABO/RH(D): NORMAL
ANTIBODY SCREEN: NEGATIVE
B-HCG SERPL-ACNC: 9078 IU/L (ref 0–5)
HGB BLD-MCNC: 14.1 G/DL (ref 11.7–15.7)
SPECIMEN EXPIRATION DATE: NORMAL

## 2022-03-08 PROCEDURE — 710N000012 HC RECOVERY PHASE 2, PER MINUTE: Performed by: OBSTETRICS & GYNECOLOGY

## 2022-03-08 PROCEDURE — 272N000001 HC OR GENERAL SUPPLY STERILE: Performed by: OBSTETRICS & GYNECOLOGY

## 2022-03-08 PROCEDURE — 250N000009 HC RX 250: Performed by: NURSE ANESTHETIST, CERTIFIED REGISTERED

## 2022-03-08 PROCEDURE — 88305 TISSUE EXAM BY PATHOLOGIST: CPT | Mod: TC | Performed by: OBSTETRICS & GYNECOLOGY

## 2022-03-08 PROCEDURE — 250N000011 HC RX IP 250 OP 636: Performed by: NURSE ANESTHETIST, CERTIFIED REGISTERED

## 2022-03-08 PROCEDURE — 999N000141 HC STATISTIC PRE-PROCEDURE NURSING ASSESSMENT: Performed by: OBSTETRICS & GYNECOLOGY

## 2022-03-08 PROCEDURE — 360N000075 HC SURGERY LEVEL 2, PER MIN: Performed by: OBSTETRICS & GYNECOLOGY

## 2022-03-08 PROCEDURE — 86850 RBC ANTIBODY SCREEN: CPT | Performed by: OBSTETRICS & GYNECOLOGY

## 2022-03-08 PROCEDURE — 85018 HEMOGLOBIN: CPT | Performed by: OBSTETRICS & GYNECOLOGY

## 2022-03-08 PROCEDURE — 370N000017 HC ANESTHESIA TECHNICAL FEE, PER MIN: Performed by: OBSTETRICS & GYNECOLOGY

## 2022-03-08 PROCEDURE — 250N000013 HC RX MED GY IP 250 OP 250 PS 637: Performed by: OBSTETRICS & GYNECOLOGY

## 2022-03-08 PROCEDURE — 250N000009 HC RX 250: Performed by: OBSTETRICS & GYNECOLOGY

## 2022-03-08 PROCEDURE — 258N000003 HC RX IP 258 OP 636: Performed by: NURSE ANESTHETIST, CERTIFIED REGISTERED

## 2022-03-08 PROCEDURE — 250N000011 HC RX IP 250 OP 636: Performed by: OBSTETRICS & GYNECOLOGY

## 2022-03-08 PROCEDURE — 59820 CARE OF MISCARRIAGE: CPT | Performed by: OBSTETRICS & GYNECOLOGY

## 2022-03-08 PROCEDURE — 84702 CHORIONIC GONADOTROPIN TEST: CPT | Performed by: FAMILY MEDICINE

## 2022-03-08 RX ORDER — ACETAMINOPHEN 325 MG/1
975 TABLET ORAL ONCE
Status: DISCONTINUED | OUTPATIENT
Start: 2022-03-08 | End: 2022-03-08 | Stop reason: HOSPADM

## 2022-03-08 RX ORDER — OXYCODONE HYDROCHLORIDE 5 MG/1
5 TABLET ORAL EVERY 4 HOURS PRN
Status: DISCONTINUED | OUTPATIENT
Start: 2022-03-08 | End: 2022-03-08 | Stop reason: HOSPADM

## 2022-03-08 RX ORDER — ONDANSETRON 2 MG/ML
4 INJECTION INTRAMUSCULAR; INTRAVENOUS EVERY 30 MIN PRN
Status: DISCONTINUED | OUTPATIENT
Start: 2022-03-08 | End: 2022-03-08 | Stop reason: HOSPADM

## 2022-03-08 RX ORDER — HYDROCODONE BITARTRATE AND ACETAMINOPHEN 5; 325 MG/1; MG/1
1 TABLET ORAL EVERY 6 HOURS PRN
Qty: 5 TABLET | Refills: 0 | Status: SHIPPED | OUTPATIENT
Start: 2022-03-08 | End: 2022-03-11

## 2022-03-08 RX ORDER — PROPOFOL 10 MG/ML
INJECTION, EMULSION INTRAVENOUS PRN
Status: DISCONTINUED | OUTPATIENT
Start: 2022-03-08 | End: 2022-03-08

## 2022-03-08 RX ORDER — ACETAMINOPHEN 325 MG/1
975 TABLET ORAL ONCE
Status: COMPLETED | OUTPATIENT
Start: 2022-03-08 | End: 2022-03-08

## 2022-03-08 RX ORDER — ONDANSETRON 2 MG/ML
INJECTION INTRAMUSCULAR; INTRAVENOUS PRN
Status: DISCONTINUED | OUTPATIENT
Start: 2022-03-08 | End: 2022-03-08

## 2022-03-08 RX ORDER — MEPERIDINE HYDROCHLORIDE 25 MG/ML
12.5 INJECTION INTRAMUSCULAR; INTRAVENOUS; SUBCUTANEOUS
Status: DISCONTINUED | OUTPATIENT
Start: 2022-03-08 | End: 2022-03-08 | Stop reason: HOSPADM

## 2022-03-08 RX ORDER — HYDROMORPHONE HYDROCHLORIDE 1 MG/ML
0.4 INJECTION, SOLUTION INTRAMUSCULAR; INTRAVENOUS; SUBCUTANEOUS EVERY 5 MIN PRN
Status: DISCONTINUED | OUTPATIENT
Start: 2022-03-08 | End: 2022-03-08 | Stop reason: HOSPADM

## 2022-03-08 RX ORDER — ALBUTEROL SULFATE 0.83 MG/ML
2.5 SOLUTION RESPIRATORY (INHALATION) EVERY 4 HOURS PRN
Status: DISCONTINUED | OUTPATIENT
Start: 2022-03-08 | End: 2022-03-08 | Stop reason: HOSPADM

## 2022-03-08 RX ORDER — HYDRALAZINE HYDROCHLORIDE 20 MG/ML
2.5-5 INJECTION INTRAMUSCULAR; INTRAVENOUS EVERY 10 MIN PRN
Status: DISCONTINUED | OUTPATIENT
Start: 2022-03-08 | End: 2022-03-08 | Stop reason: HOSPADM

## 2022-03-08 RX ORDER — DIMENHYDRINATE 50 MG/ML
INJECTION, SOLUTION INTRAMUSCULAR; INTRAVENOUS PRN
Status: DISCONTINUED | OUTPATIENT
Start: 2022-03-08 | End: 2022-03-08

## 2022-03-08 RX ORDER — FENTANYL CITRATE 50 UG/ML
INJECTION, SOLUTION INTRAMUSCULAR; INTRAVENOUS PRN
Status: DISCONTINUED | OUTPATIENT
Start: 2022-03-08 | End: 2022-03-08

## 2022-03-08 RX ORDER — SODIUM CHLORIDE, SODIUM LACTATE, POTASSIUM CHLORIDE, CALCIUM CHLORIDE 600; 310; 30; 20 MG/100ML; MG/100ML; MG/100ML; MG/100ML
INJECTION, SOLUTION INTRAVENOUS CONTINUOUS
Status: DISCONTINUED | OUTPATIENT
Start: 2022-03-08 | End: 2022-03-08 | Stop reason: HOSPADM

## 2022-03-08 RX ORDER — ONDANSETRON 4 MG/1
4 TABLET, ORALLY DISINTEGRATING ORAL EVERY 30 MIN PRN
Status: DISCONTINUED | OUTPATIENT
Start: 2022-03-08 | End: 2022-03-08 | Stop reason: HOSPADM

## 2022-03-08 RX ORDER — KETOROLAC TROMETHAMINE 30 MG/ML
INJECTION, SOLUTION INTRAMUSCULAR; INTRAVENOUS PRN
Status: DISCONTINUED | OUTPATIENT
Start: 2022-03-08 | End: 2022-03-08

## 2022-03-08 RX ORDER — LIDOCAINE HYDROCHLORIDE 20 MG/ML
INJECTION, SOLUTION INFILTRATION; PERINEURAL PRN
Status: DISCONTINUED | OUTPATIENT
Start: 2022-03-08 | End: 2022-03-08

## 2022-03-08 RX ORDER — FENTANYL CITRATE 50 UG/ML
50 INJECTION, SOLUTION INTRAMUSCULAR; INTRAVENOUS EVERY 5 MIN PRN
Status: DISCONTINUED | OUTPATIENT
Start: 2022-03-08 | End: 2022-03-08 | Stop reason: HOSPADM

## 2022-03-08 RX ORDER — PROPOFOL 10 MG/ML
INJECTION, EMULSION INTRAVENOUS CONTINUOUS PRN
Status: DISCONTINUED | OUTPATIENT
Start: 2022-03-08 | End: 2022-03-08

## 2022-03-08 RX ORDER — LIDOCAINE 40 MG/G
CREAM TOPICAL
Status: DISCONTINUED | OUTPATIENT
Start: 2022-03-08 | End: 2022-03-08 | Stop reason: HOSPADM

## 2022-03-08 RX ORDER — IBUPROFEN 800 MG/1
800 TABLET, FILM COATED ORAL EVERY 6 HOURS PRN
Qty: 10 TABLET | Refills: 0 | Status: SHIPPED | OUTPATIENT
Start: 2022-03-08 | End: 2022-11-20

## 2022-03-08 RX ORDER — IBUPROFEN 800 MG/1
800 TABLET, FILM COATED ORAL ONCE
Status: DISCONTINUED | OUTPATIENT
Start: 2022-03-08 | End: 2022-03-08 | Stop reason: HOSPADM

## 2022-03-08 RX ORDER — METOPROLOL TARTRATE 1 MG/ML
1-2 INJECTION, SOLUTION INTRAVENOUS EVERY 5 MIN PRN
Status: DISCONTINUED | OUTPATIENT
Start: 2022-03-08 | End: 2022-03-08 | Stop reason: HOSPADM

## 2022-03-08 RX ORDER — FENTANYL CITRATE 50 UG/ML
50 INJECTION, SOLUTION INTRAMUSCULAR; INTRAVENOUS
Status: DISCONTINUED | OUTPATIENT
Start: 2022-03-08 | End: 2022-03-08 | Stop reason: HOSPADM

## 2022-03-08 RX ORDER — LIDOCAINE HYDROCHLORIDE AND EPINEPHRINE 10; 10 MG/ML; UG/ML
INJECTION, SOLUTION INFILTRATION; PERINEURAL PRN
Status: DISCONTINUED | OUTPATIENT
Start: 2022-03-08 | End: 2022-03-08 | Stop reason: HOSPADM

## 2022-03-08 RX ADMIN — ACETAMINOPHEN 975 MG: 325 TABLET, FILM COATED ORAL at 13:43

## 2022-03-08 RX ADMIN — PROPOFOL 200 MCG/KG/MIN: 10 INJECTION, EMULSION INTRAVENOUS at 15:38

## 2022-03-08 RX ADMIN — HUMAN RHO(D) IMMUNE GLOBULIN 300 MCG: 1500 SOLUTION INTRAMUSCULAR; INTRAVENOUS at 17:23

## 2022-03-08 RX ADMIN — LIDOCAINE HYDROCHLORIDE 60 MG: 20 INJECTION, SOLUTION INFILTRATION; PERINEURAL at 15:38

## 2022-03-08 RX ADMIN — FENTANYL CITRATE 50 MCG: 50 INJECTION, SOLUTION INTRAMUSCULAR; INTRAVENOUS at 16:12

## 2022-03-08 RX ADMIN — PROPOFOL 50 MG: 10 INJECTION, EMULSION INTRAVENOUS at 15:38

## 2022-03-08 RX ADMIN — LIDOCAINE HYDROCHLORIDE 0.1 ML: 10 INJECTION, SOLUTION EPIDURAL; INFILTRATION; INTRACAUDAL; PERINEURAL at 14:18

## 2022-03-08 RX ADMIN — MIDAZOLAM 2 MG: 1 INJECTION INTRAMUSCULAR; INTRAVENOUS at 15:33

## 2022-03-08 RX ADMIN — DIMENHYDRINATE 25 MG: 50 INJECTION, SOLUTION INTRAMUSCULAR; INTRAVENOUS at 15:37

## 2022-03-08 RX ADMIN — KETOROLAC TROMETHAMINE 30 MG: 30 INJECTION, SOLUTION INTRAMUSCULAR at 16:07

## 2022-03-08 RX ADMIN — SODIUM CHLORIDE, POTASSIUM CHLORIDE, SODIUM LACTATE AND CALCIUM CHLORIDE: 600; 310; 30; 20 INJECTION, SOLUTION INTRAVENOUS at 14:10

## 2022-03-08 RX ADMIN — FENTANYL CITRATE 50 MCG: 50 INJECTION, SOLUTION INTRAMUSCULAR; INTRAVENOUS at 15:33

## 2022-03-08 RX ADMIN — ONDANSETRON 4 MG: 2 INJECTION INTRAMUSCULAR; INTRAVENOUS at 15:53

## 2022-03-08 ASSESSMENT — LIFESTYLE VARIABLES: TOBACCO_USE: 1

## 2022-03-08 NOTE — BRIEF OP NOTE
[facility]    Brief Operative Note    Pre-operative diagnosis: Miscarriage [O03.9]  Post-operative diagnosis Same as pre-operative diagnosis    Procedure: Procedure(s):  DILATION AND CURETTAGE, UTERUS, USING SUCTION  Surgeon: Surgeon(s) and Role:     * Bairon Thomas MD - Primary  Anesthesia: Moderate Sedation   Estimated Blood Loss: 5 mL from 3/8/2022  3:35 PM to 3/8/2022  4:13 PM      Drains: None  Specimens:   ID Type Source Tests Collected by Time Destination   1 : products of conception Products of Conception Products of Conception SURGICAL PATHOLOGY EXAM Bairon Thomas MD 3/8/2022  3:58 PM      Findings:   Products of conception.  Complications: None.  Implants: * No implants in log *        
What Type Of Note Output Would You Prefer (Optional)?: Bullet Format
How Severe Are Your Spot(S)?: mild
Have Your Spot(S) Been Treated In The Past?: has not been treated
Hpi Title: Evaluation of Skin Lesions

## 2022-03-08 NOTE — ANESTHESIA PREPROCEDURE EVALUATION
"Anesthesia Pre-Procedure Evaluation    Patient: Estefania Arguello   MRN: [id number] : 1980        Procedure : Procedure(s):  DILATION AND CURETTAGE, UTERUS, USING SUCTION          Past Medical History:   Diagnosis Date     Back pain      Hypothyroidism      Seasonal allergies       Past Surgical History:   Procedure Laterality Date     EYE SURGERY      \"benign tumor above right eye\"     MOUTH SURGERY      \"benign tumor under molar removed\"      No Known Allergies   Social History     Tobacco Use     Smoking status: Former Smoker     Quit date: 2018     Years since quitting: 3.6     Smokeless tobacco: Former User   Substance Use Topics     Alcohol use: Not Currently      Wt Readings from Last 1 Encounters:   22 91.6 kg (202 lb)        Anesthesia Evaluation   Pt has had prior anesthetic.     No history of anesthetic complications       ROS/MED HX  ENT/Pulmonary:     (+) tobacco use, Past use,     Neurologic:  - neg neurologic ROS     Cardiovascular:  - neg cardiovascular ROS   (+) -----No previous cardiac testing     METS/Exercise Tolerance:     Hematologic:  - neg hematologic  ROS     Musculoskeletal: Comment: CLBP      GI/Hepatic:  - neg GI/hepatic ROS  (-) GERD   Renal/Genitourinary:  - neg Renal ROS     Endo: Comment: Pt had gestational diabetes.  Is OK now    (+) thyroid problem, hypothyroidism,     Psychiatric/Substance Use:  - neg psychiatric ROS     Infectious Disease:  - neg infectious disease ROS     Malignancy:  - neg malignancy ROS     Other:  - neg other ROS          Physical Exam    Airway        Mallampati: II   TM distance: > 3 FB   Neck ROM: full   Mouth opening: > 3 cm    Respiratory Devices and Support         Dental  no notable dental history         Cardiovascular   cardiovascular exam normal       Rhythm and rate: regular and normal     Pulmonary   pulmonary exam normal        breath sounds clear to auscultation       Other findings: Pt has a nasal septum nose piercing    OUTSIDE " LABS:  CBC:   Lab Results   Component Value Date    WBC 5.6 03/04/2022    WBC 6.0 02/11/2022    HGB 14.1 03/08/2022    HGB 14.3 03/04/2022    HCT 42.2 03/04/2022    HCT 43.2 02/11/2022     03/04/2022     02/11/2022     BMP:   Lab Results   Component Value Date     04/02/2021     01/22/2021    POTASSIUM 4.1 04/02/2021    POTASSIUM 3.6 01/22/2021    CHLORIDE 105 04/02/2021    CHLORIDE 105 01/22/2021    CO2 26 04/02/2021    CO2 26 01/22/2021    BUN 13 04/02/2021    BUN 10 01/22/2021    CR 0.54 04/02/2021    CR 0.54 01/22/2021    GLC 93 04/02/2021     (H) 01/22/2021     COAGS: No results found for: PTT, INR, FIBR  POC:   Lab Results   Component Value Date    BGM 76 05/07/2021    HCG Positive (A) 06/18/2020     HEPATIC:   Lab Results   Component Value Date    ALBUMIN 2.5 (L) 04/02/2021    PROTTOTAL 6.6 (L) 04/02/2021    ALT 29 04/02/2021    AST 16 04/02/2021    ALKPHOS 71 04/02/2021    BILITOTAL 0.2 04/02/2021     OTHER:   Lab Results   Component Value Date    A1C 5.2 02/11/2022    LATHA 8.4 (L) 04/02/2021    TSH 0.05 (L) 02/11/2022    T4 1.07 02/11/2022    T4 13.0 02/11/2022       Anesthesia Plan    ASA Status:  2   NPO Status:  NPO Appropriate    Anesthesia Type: MAC.     - Reason for MAC: straight local not clinically adequate   Induction: Intravenous, Propofol.   Maintenance: TIVA.        Consents    Anesthesia Plan(s) and associated risks, benefits, and realistic alternatives discussed. Questions answered and patient/representative(s) expressed understanding.    - Discussed:     - Discussed with:  Patient      - Extended Intubation/Ventilatory Support Discussed: No.      - Patient is DNR/DNI Status: No    Use of blood products discussed: Yes.     - Discussed with: Patient.     - Consented: consented to blood products            Reason for refusal: other.     Postoperative Care    Pain management: IV analgesics, Oral pain medications.   PONV prophylaxis: Ondansetron (or other 5HT-3),  Meclizine or Dimenhydrinate     Comments:    Other Comments: The risks and benefits of anesthesia, and the alternatives where applicable, have been discussed with the patient, and they wish to proceed.            ADARSH Matos CRNA

## 2022-03-09 NOTE — OP NOTE
Procedure Date: 03/08/2022    PREOPERATIVE DIAGNOSES:    1.  Pregnancy between 6-11 weeks based on a 6-week gestational size and 11-week last menstrual period.  2.  Nonviable pregnancy with a negative cardiac activity.    POSTOPERATIVE DIAGNOSES:   1.  Pregnancy between 6-11 weeks based on a 6-week gestational size and 11-week last menstrual period.  2.  Nonviable pregnancy with a negative cardiac activity.  3.  Uterus sounding to approximately 12 cm, consistent with a significantly larger uterus than a 6-week gestation.    PROCEDURE PERFORMED:  Suction D and C.    SURGEON:  Bairon Thomas MD    ANESTHESIA:  MAC and paracervical block.    COMPLICATIONS:  None.    DRAINS/PACKS:  None.    ESTIMATED BLOOD LOSS:  5 mL or less.     FINDINGS:  Abundant products of conception that I cleaned out with sharp curettage and suction curettage.  Suction curettage done approximately 5 times to remove all the products of conception.    INDICATIONS FOR PROCEDURE:  Ms. Arguello is a 41-year-old P1-0-1-1 who had a delivery approximately 9 months ago and was pregnant soon thereafter and had a miscarriage.    DESCRIPTION OF PROCEDURE:  After adequate MAC anesthesia, the patient was prepped and draped in the usual sterile fashion, placed in the dorsal lithotomy position.  Speculum was placed in the vagina.  Anterior lip of the cervix was anesthetized with 2 mL of 1% lidocaine and then an intracervical and paracervical block then done.  Single tooth tenaculum articulated to the anterior lip of the cervix.  Uterus sounded initially to 10, but eventually to 14 deviating to the patient's right side.  A suction curettage was done with a 7, after was dilated to a size 17 Posey dilator.  Products of conception recovered.  Suction curettage was repeated x3 followed by sharp curettage x2 and then suction curettage again x3 with each time recovering additional products of conception.  A decision was then made to dilate to 8 and use a size 8  dilator and the sharp curettage was done lightly 1 final time followed by a suction curettage x2 with finally receiving no further products of conception.    Instruments were removed from the vagina.  Hemostasis noted.  The patient was recalled from MAC anesthesia without difficulty, will be discharged home, awake, alert, oriented p.o., voiding.    DISCHARGE MEDICATIONS:  Include ibuprofen and Norco.     Follow up with Dr. Rosemary Holder.    Bairon Thomas MD        D: 2022   T: 2022   MT: [id number]    Name:     TIFFANIE RODRIGUEZ  MRN:      [id number]        Account:        [id number]   :      1980           Procedure Date: 2022     Document: [id number]    cc:  Anyi Holder MD

## 2022-03-09 NOTE — ANESTHESIA POSTPROCEDURE EVALUATION
Patient: Estefania Arguello    Procedure: Procedure(s):  DILATION AND CURETTAGE, UTERUS, USING SUCTION       Anesthesia Type:  MAC    Note:  Disposition: Outpatient   Postop Pain Control: Uneventful            Sign Out: Well controlled pain   PONV: No   Neuro/Psych: Uneventful            Sign Out: Acceptable/Baseline neuro status   Airway/Respiratory: Uneventful            Sign Out: Acceptable/Baseline resp. status   CV/Hemodynamics: Uneventful            Sign Out: Acceptable CV status   Other NRE: NONE   DID A NON-ROUTINE EVENT OCCUR? No    Event details/Postop Comments:  Pt was happy with anesthesia care.  No complications.  I will follow up with the pt if needed.           Last vitals:  Vitals Value Taken Time   /53 03/08/22 1645   Temp 98.78  F (37.1  C) 03/08/22 1654   Pulse 71 03/08/22 1645   Resp 16 03/08/22 1630   SpO2 99 % 03/08/22 1654   Vitals shown include unvalidated device data.    Electronically Signed By: ADARSH Matos CRNA  March 8, 2022  10:30 PM

## 2022-03-09 NOTE — ANESTHESIA CARE TRANSFER NOTE
Patient: Estefania Arguello    Procedure: Procedure(s):  DILATION AND CURETTAGE, UTERUS, USING SUCTION       Diagnosis: Miscarriage [O03.9]  Diagnosis Additional Information: No value filed.    Anesthesia Type:   MAC     Note:    Oropharynx: oropharynx clear of all foreign objects and spontaneously breathing  Level of Consciousness: awake  Oxygen Supplementation: room air    Independent Airway: airway patency satisfactory and stable  Dentition: dentition unchanged  Vital Signs Stable: post-procedure vital signs reviewed and stable  Report to RN Given: handoff report given  Patient transferred to: Phase II    Handoff Report: Identifed the Patient, Identified the Reponsible Provider, Reviewed the pertinent medical history, Discussed the surgical course, Reviewed Intra-OP anesthesia mangement and issues during anesthesia, Set expectations for post-procedure period and Allowed opportunity for questions and acknowledgement of understanding      Vitals:  Vitals Value Taken Time   /53 03/08/22 1645   Temp 98.78  F (37.1  C) 03/08/22 1654   Pulse 71 03/08/22 1645   Resp 16 03/08/22 1630   SpO2 99 % 03/08/22 1654   Vitals shown include unvalidated device data.    Electronically Signed By: ADARSH Matos CRNA  March 8, 2022  10:25 PM

## 2022-03-10 LAB
PATH REPORT.COMMENTS IMP SPEC: NORMAL
PATH REPORT.COMMENTS IMP SPEC: NORMAL
PATH REPORT.FINAL DX SPEC: NORMAL
PATH REPORT.GROSS SPEC: NORMAL
PATH REPORT.MICROSCOPIC SPEC OTHER STN: NORMAL
PATH REPORT.RELEVANT HX SPEC: NORMAL
PHOTO IMAGE: NORMAL

## 2022-03-10 PROCEDURE — 88305 TISSUE EXAM BY PATHOLOGIST: CPT | Mod: 26 | Performed by: PATHOLOGY

## 2022-03-11 ENCOUNTER — NURSE TRIAGE (OUTPATIENT)
Dept: NURSING | Facility: CLINIC | Age: 42
End: 2022-03-11
Payer: MEDICAID

## 2022-03-12 NOTE — TELEPHONE ENCOUNTER
"Patient reporting she had D and C done 3-8-22 after failed pregnancy.  Concerned this evening due to passing some tissue and is supposed to call.    Today started to bleed \"a bit more\" with cramps 3-4/10 earlier but not right now. Has gone through two pads today, not fully saturated, taking Advil, Tylenol.      Passed some tissue size of a date color of white/clear with red fragments \"but not a clot.\"    Denies dizziness, fever.    Provider on call paged to [phone] via [employer].    Called [facility] charge nurse, who is the on-call recommendation for OB, who said continue to treat at home unless bleeding gets heavy, develops fever, pain increases.  Call back for any of the above issues and follow up with provider.    Patient updated who verbalized understanding of care advice and agreed to plan.    Danae Patel RN  [facility]      Reason for Disposition    [1] Caller has URGENT question AND [2] triager unable to answer question    Additional Information    Negative: Sounds like a life-threatening emergency to the triager    Negative: [1] Widespread rash AND [2] bright red, sunburn-like    Negative: [1] SEVERE headache AND [2] after spinal (epidural) anesthesia    Negative: [1] Vomiting AND [2] persists > 4 hours    Negative: [1] Vomiting AND [2] abdomen looks much more swollen than usual    Negative: [1] Drinking very little AND [2] dehydration suspected (e.g., no urine > 12 hours, very dry mouth, very lightheaded)    Negative: Patient sounds very sick or weak to the triager    Negative: Sounds like a serious complication to the triager    Negative: Fever > 100.4 F (38.0 C)    Negative: Shock suspected (e.g., cold/pale/clammy skin, too weak to stand, low BP, rapid pulse)    Negative: Difficult to awaken or acting confused (e.g., disoriented, slurred speech)    Negative: Passed out (i.e., lost consciousness, collapsed and was not responding)    Negative: Sounds like a " life-threatening emergency to the triager    Negative: SEVERE abdominal pain    Negative: SEVERE dizziness (e.g., unable to stand, requires support to walk, feels like passing out now)    Negative: SEVERE vaginal bleeding (e.g., soaking 2 pads or tampons per hour and present 2 or more hours; 1 menstrual cup every 2 hours)    Negative: Patient sounds very sick or weak to the triager    Negative: MODERATE vaginal bleeding (i.e., soaking 1 pad or tampon per hour and present > 6 hours; 1 menstrual cup every 6 hours)    Negative: [1] SEVERE post-op pain (e.g., excruciating, pain scale 8-10) AND [2] not controlled with pain medications    Protocols used: POST-OP SYMPTOMS AND [id number], VAGINAL BLEEDING - [id number]

## 2022-07-25 ENCOUNTER — TELEPHONE (OUTPATIENT)
Dept: FAMILY MEDICINE | Facility: CLINIC | Age: 42
End: 2022-07-25

## 2022-08-05 ENCOUNTER — TELEPHONE (OUTPATIENT)
Dept: FAMILY MEDICINE | Facility: CLINIC | Age: 42
End: 2022-08-05

## 2022-08-05 DIAGNOSIS — Z30.41 ORAL CONTRACEPTIVE PILL SURVEILLANCE: Primary | ICD-10-CM

## 2022-08-05 RX ORDER — DESOGESTREL AND ETHINYL ESTRADIOL 0.15-0.03
1 KIT ORAL DAILY
Qty: 112 TABLET | Refills: 3 | Status: SHIPPED | OUTPATIENT
Start: 2022-08-05 | End: 2023-07-06

## 2022-08-05 NOTE — TELEPHONE ENCOUNTER
Estefania is here with her daughter and would like to change birth control pills.  She is currently on progesterone only and is not breast-feeding.  She is having heavy and painful periods and I recommend she go on an estrogen containing birth control pill.  See orders.  She has an upcoming physical.  Anyi Holder MD    Patient scheduled for surgery on 12/16/19.     Message #1 left for patient to call back.

## 2022-08-05 NOTE — PATIENT INSTRUCTIONS
Estefania, I sent a new birth control prescription for you to the [facility] in [place].  Your thyroid medication has plenty of refills but they are at the [facility] pharmacy.  You just need to call [facility] and they will transfer it to [facility].  Anyi Holder MD

## 2022-09-24 ENCOUNTER — HEALTH MAINTENANCE LETTER (OUTPATIENT)
Age: 42
End: 2022-09-24

## 2022-11-02 ENCOUNTER — OFFICE VISIT (OUTPATIENT)
Dept: FAMILY MEDICINE | Facility: CLINIC | Age: 42
End: 2022-11-02
Payer: COMMERCIAL

## 2022-11-02 VITALS
OXYGEN SATURATION: 100 % | BODY MASS INDEX: 32.62 KG/M2 | HEART RATE: 78 BPM | SYSTOLIC BLOOD PRESSURE: 110 MMHG | WEIGHT: 203 LBS | TEMPERATURE: 97.7 F | HEIGHT: 66 IN | DIASTOLIC BLOOD PRESSURE: 62 MMHG

## 2022-11-02 DIAGNOSIS — Z13.6 CARDIOVASCULAR SCREENING; LDL GOAL LESS THAN 160: ICD-10-CM

## 2022-11-02 DIAGNOSIS — E03.9 HYPOTHYROIDISM, UNSPECIFIED TYPE: ICD-10-CM

## 2022-11-02 DIAGNOSIS — Z86.32 HISTORY OF GESTATIONAL DIABETES MELLITUS (GDM): ICD-10-CM

## 2022-11-02 DIAGNOSIS — E66.09 CLASS 1 OBESITY DUE TO EXCESS CALORIES WITHOUT SERIOUS COMORBIDITY WITH BODY MASS INDEX (BMI) OF 32.0 TO 32.9 IN ADULT: ICD-10-CM

## 2022-11-02 DIAGNOSIS — Z12.4 CERVICAL CANCER SCREENING: ICD-10-CM

## 2022-11-02 DIAGNOSIS — Z00.00 ROUTINE GENERAL MEDICAL EXAMINATION AT A HEALTH CARE FACILITY: Primary | ICD-10-CM

## 2022-11-02 DIAGNOSIS — E66.811 CLASS 1 OBESITY DUE TO EXCESS CALORIES WITHOUT SERIOUS COMORBIDITY WITH BODY MASS INDEX (BMI) OF 32.0 TO 32.9 IN ADULT: ICD-10-CM

## 2022-11-02 LAB
CHOLEST SERPL-MCNC: 171 MG/DL
FASTING STATUS PATIENT QL REPORTED: YES
HDLC SERPL-MCNC: 63 MG/DL
LDLC SERPL CALC-MCNC: 77 MG/DL
NONHDLC SERPL-MCNC: 108 MG/DL
TRIGL SERPL-MCNC: 155 MG/DL
TSH SERPL DL<=0.005 MIU/L-ACNC: 1.04 MU/L (ref 0.4–4)

## 2022-11-02 PROCEDURE — 99213 OFFICE O/P EST LOW 20 MIN: CPT | Mod: 25 | Performed by: FAMILY MEDICINE

## 2022-11-02 PROCEDURE — 99396 PREV VISIT EST AGE 40-64: CPT | Mod: 25 | Performed by: FAMILY MEDICINE

## 2022-11-02 PROCEDURE — 36415 COLL VENOUS BLD VENIPUNCTURE: CPT | Performed by: FAMILY MEDICINE

## 2022-11-02 PROCEDURE — 90471 IMMUNIZATION ADMIN: CPT | Performed by: FAMILY MEDICINE

## 2022-11-02 PROCEDURE — 87624 HPV HI-RISK TYP POOLED RSLT: CPT | Performed by: FAMILY MEDICINE

## 2022-11-02 PROCEDURE — 84443 ASSAY THYROID STIM HORMONE: CPT | Performed by: FAMILY MEDICINE

## 2022-11-02 PROCEDURE — G0145 SCR C/V CYTO,THINLAYER,RESCR: HCPCS | Performed by: FAMILY MEDICINE

## 2022-11-02 PROCEDURE — 80061 LIPID PANEL: CPT | Performed by: FAMILY MEDICINE

## 2022-11-02 PROCEDURE — 90686 IIV4 VACC NO PRSV 0.5 ML IM: CPT | Performed by: FAMILY MEDICINE

## 2022-11-02 ASSESSMENT — ENCOUNTER SYMPTOMS
FEVER: 0
DYSURIA: 0
HEMATOCHEZIA: 0
BREAST MASS: 0
ARTHRALGIAS: 1
WEAKNESS: 0
SORE THROAT: 0
MYALGIAS: 1
JOINT SWELLING: 0
NERVOUS/ANXIOUS: 1
HEADACHES: 1
DIARRHEA: 0
COUGH: 0
CONSTIPATION: 0
NAUSEA: 0
PARESTHESIAS: 0
EYE PAIN: 0
HEMATURIA: 0
HEARTBURN: 0
PALPITATIONS: 0
ABDOMINAL PAIN: 0
CHILLS: 0
DIZZINESS: 0
SHORTNESS OF BREATH: 0
FREQUENCY: 0

## 2022-11-02 NOTE — PROGRESS NOTES
SUBJECTIVE:   CC: Estefania is an 42 year old who presents for preventive health visit.   Patient has been advised of split billing requirements and indicates understanding: Yes  Healthy Habits:     Getting at least 3 servings of Calcium per day:  Yes    Bi-annual eye exam:  NO    Dental care twice a year:  NO    Sleep apnea or symptoms of sleep apnea:  Daytime drowsiness    Diet:  Regular (no restrictions)    Frequency of exercise:  2-3 days/week    Duration of exercise:  15-30 minutes    Taking medications regularly:  Yes    Medication side effects:  None    PHQ-2 Total Score: 0    Additional concerns today:  Yes      Today's PHQ-2 Score:   PHQ-2 (  [employer]) 2022   Q1: Little interest or pleasure in doing things 0   Q2: Feeling down, depressed or hopeless 0   PHQ-2 Score 0   PHQ-2 Total Score (12-17 Years)- Positive if 3 or more points; Administer PHQ-A if positive -   Q1: Little interest or pleasure in doing things Not at all   Q2: Feeling down, depressed or hopeless Not at all   PHQ-2 Score 0       Abuse: Current or Past (Physical, Sexual or Emotional) - No  Do you feel safe in your environment? Yes    Have you ever done Advance Care Planning? (For example, a Health Directive, POLST, or a discussion with a medical provider or your loved ones about your wishes): No, advance care planning information given to patient to review.  Advanced care planning was discussed at today's visit.    Social History     Tobacco Use     Smoking status: Former     Types: Cigarettes     Quit date: 2018     Years since quittin.3     Smokeless tobacco: Former   Substance Use Topics     Alcohol use: Not Currently         Alcohol Use 2022   Prescreen: >3 drinks/day or >7 drinks/week? No       Reviewed orders with patient.  Reviewed health maintenance and updated orders accordingly - Yes  BP Readings from Last 3 Encounters:   22 110/62   22 105/53   22 122/80    Wt Readings from Last 3 Encounters:  "  22 92.1 kg (203 lb)   22 91.6 kg (202 lb)   22 91.6 kg (202 lb)                  Patient Active Problem List   Diagnosis     Hypothyroidism, unspecified type     History of gestational diabetes mellitus (GDM)     Class 1 obesity due to excess calories without serious comorbidity with body mass index (BMI) of 32.0 to 32.9 in adult     Past Surgical History:   Procedure Laterality Date     DILATION AND CURETTAGE SUCTION N/A 3/8/2022    Procedure: DILATION AND CURETTAGE, UTERUS, USING SUCTION;  Surgeon: Bairon Thomas MD;  Location: [facility]     EYE SURGERY      \"benign tumor above right eye\"     MOUTH SURGERY      \"benign tumor under molar removed\"       Social History     Tobacco Use     Smoking status: Former     Types: Cigarettes     Quit date: 2018     Years since quittin.3     Smokeless tobacco: Former   Substance Use Topics     Alcohol use: Not Currently     Family History   Problem Relation Age of Onset     Diabetes Mother      Thyroid Disease Mother      Other - See Comments Mother         B12 deficiancy     Hyperlipidemia Mother      No Known Problems Father      No Known Problems Sister      No Known Problems Brother      Skin Cancer Maternal Grandmother      Cancer Maternal Grandmother         skin     Thyroid Disease Maternal Grandmother      Emphysema Maternal Grandfather      Hypertension Paternal Grandmother      Thyroid Disease Paternal Grandmother      Emphysema Paternal Grandfather      No Known Problems Daughter      Cancer Maternal Aunt         stomach     Cancer Maternal Uncle         stomach     Lymphoma Paternal Aunt         intravascular     ALS Paternal Uncle            Breast Cancer Screening:    Breast CA Risk Assessment (FHS-7) 2022   Do you have a family history of breast, colon, or ovarian cancer? No / Unknown         Mammogram Screening - Offered annual screening and updated Health Maintenance for mutual plan based on risk factor " "consideration    Pertinent mammograms are reviewed under the imaging tab.    History of abnormal Pap smear: NO - age 30- 65 PAP every 3 years recommended  PAP / HPV Latest Ref Rng & Units 11/2/2022   PAP   Negative for Intraepithelial Lesion or Malignancy (NILM)   HPV16 Negative Negative   HPV18 Negative Negative   HRHPV Negative Negative     Reviewed and updated as needed this visit by clinical staff   Tobacco  Allergies    Med Hx  Surg Hx  Fam Hx  Soc Hx        Reviewed and updated as needed this visit by Provider                     Review of Systems   Constitutional: Negative for chills and fever.   HENT: Negative for congestion, ear pain, hearing loss and sore throat.    Eyes: Negative for pain and visual disturbance.   Respiratory: Negative for cough and shortness of breath.    Cardiovascular: Negative for chest pain, palpitations and peripheral edema.   Gastrointestinal: Negative for abdominal pain, constipation, diarrhea, heartburn, hematochezia and nausea.   Breasts:  Negative for tenderness, breast mass and discharge.   Genitourinary: Negative for dysuria, frequency, genital sores, hematuria, pelvic pain, urgency, vaginal bleeding and vaginal discharge.   Musculoskeletal: Positive for arthralgias and myalgias. Negative for joint swelling.   Skin: Negative for rash.   Neurological: Positive for headaches. Negative for dizziness, weakness and paresthesias.   Psychiatric/Behavioral: Negative for mood changes. The patient is nervous/anxious.      Has a history of GDM, last A1c was in February and normal.  Also has a history of hypothyroidism, is taking her Synthroid regularly.  Last TSH in February was slightly low but T4 was normal.  She feels cold often.  Also complains of back pain.  Also wondering if she needs to have an EGD as she has a family history of stomach cancer.     OBJECTIVE:   /62   Pulse 78   Temp 97.7  F (36.5  C) (Tympanic)   Ht 1.685 m (5' 6.34\")   Wt 92.1 kg (203 lb)   SpO2 " 100%   BMI 32.43 kg/m    Physical Exam  GENERAL: healthy, alert and no distress  EYES: Eyes grossly normal to inspection, PERRL and conjunctivae and sclerae normal  HENT: ear canals and TM's normal, nose and mouth without ulcers or lesions  NECK: no adenopathy, no asymmetry, masses, or scars and thyroid normal to palpation, no bruits.  RESP: lungs clear to auscultation - no rales, rhonchi or wheezes  BREAST: normal without masses, tenderness or nipple discharge and no palpable axillary masses or adenopathy  CV: regular rate and rhythm, normal S1 S2, no S3 or S4, no murmur, click or rub, no peripheral edema and peripheral pulses strong  ABDOMEN: soft, nontender, no hepatosplenomegaly, no masses and bowel sounds normal   (female): Vaginal exam reveals normal external and internal genitalia.  Cervix is closed, long and thick.  No lesions or abnormalities seen.  Pap co-test collected. Bimanual exam reveals a nontender, nongravid uterus with no CMT.  No adnexal masses or tenderness.     MS: no gross musculoskeletal defects noted, no edema  SKIN: no suspicious lesions or rashes  NEURO: Normal strength and tone, mentation intact and speech normal  PSYCH: mentation appears normal, affect normal/bright    Diagnostic Test Results:  Orders Placed This Encounter   Procedures     REVIEW OF HEALTH MAINTENANCE PROTOCOL ORDERS     INFLUENZA VACCINE IM > 6 MONTHS VALENT IIV4 (AFLURIA/FLUZONE)     Lipid panel reflex to direct LDL Fasting     TSH with free T4 reflex     HPV High Risk Types DNA Cervical        ASSESSMENT/PLAN:       ICD-10-CM    1. Routine general medical examination at a health care facility  Z00.00       2. Hypothyroidism, unspecified type  E03.9 TSH with free T4 reflex     TSH with free T4 reflex     levothyroxine (SYNTHROID/LEVOTHROID) 100 MCG tablet      3. History of gestational diabetes mellitus (GDM)  Z86.32       4. Class 1 obesity due to excess calories without serious comorbidity with body mass index  "(BMI) of 32.0 to 32.9 in adult  E66.09     Z68.32       5. Cervical cancer screening  Z12.4 Pap Screen with HPV - recommended age 30 - 65 years     HPV Hold (Lab Only)     HPV High Risk Types DNA Cervical      6. CARDIOVASCULAR SCREENING; LDL GOAL LESS THAN 160  Z13.6 Lipid panel reflex to direct LDL Fasting     Lipid panel reflex to direct LDL Fasting          Patient has been advised of split billing requirements and indicates understanding: Yes  I recommend a yearly physical, monthly self breast exam, mammogram beginning at age 45.  Pap smear every 3 years if normal or per ACOG guidelines.  We will recheck her TSH today due to concerns of thyroid disease and history.  Screening lipids pending, notify with results.  I reassured her that her last A1c was normal, recommend continuing to lose weight to reduce risk of type 2 diabetes.  Recommend following this yearly.    COUNSELING:  Reviewed preventive health counseling, as reflected in patient instructions  Special attention given to:        Regular exercise       Healthy diet/nutrition       Vision screening       Immunizations    Vaccinated for: Influenza             Contraception refill OCPs.    Estimated body mass index is 32.43 kg/m  as calculated from the following:    Height as of this encounter: 1.685 m (5' 6.34\").    Weight as of this encounter: 92.1 kg (203 lb).    Weight management plan: Discussed healthy diet and exercise guidelines    She reports that she quit smoking about 4 years ago. She has quit using smokeless tobacco.      Counseling Resources:  ATP IV Guidelines  Pooled Cohorts Equation Calculator  Breast Cancer Risk Calculator  BRCA-Related Cancer Risk Assessment: FHS-7 Tool  FRAX Risk Assessment  ICSI Preventive Guidelines  Dietary Guidelines for Americans, 2010  USDA's MyPlate  ASA Prophylaxis  Lung CA Screening    Anyi Holder MD  [facility]  "

## 2022-11-04 LAB
BKR LAB AP GYN ADEQUACY: NORMAL
BKR LAB AP GYN INTERPRETATION: NORMAL
BKR LAB AP HPV REFLEX: NORMAL
BKR LAB AP PREVIOUS ABNORMAL: NORMAL
PATH REPORT.COMMENTS IMP SPEC: NORMAL
PATH REPORT.COMMENTS IMP SPEC: NORMAL
PATH REPORT.RELEVANT HX SPEC: NORMAL

## 2022-11-08 LAB
HUMAN PAPILLOMA VIRUS 16 DNA: NEGATIVE
HUMAN PAPILLOMA VIRUS 18 DNA: NEGATIVE
HUMAN PAPILLOMA VIRUS FINAL DIAGNOSIS: NORMAL
HUMAN PAPILLOMA VIRUS OTHER HR: NEGATIVE

## 2022-11-09 NOTE — RESULT ENCOUNTER NOTE
Estefania, your thyroid is back to normal.  Your cholesterol looks good.  Triglycerides just a touch elevated but your other numbers look excellent.  Anyi Holder MD

## 2022-11-20 PROBLEM — O24.419 GDM, CLASS A2: Status: RESOLVED | Noted: 2021-02-19 | Resolved: 2022-11-20

## 2022-11-20 PROBLEM — Z86.32 HISTORY OF GESTATIONAL DIABETES MELLITUS (GDM): Status: ACTIVE | Noted: 2022-11-20

## 2022-11-20 RX ORDER — LEVOTHYROXINE SODIUM 100 UG/1
100 TABLET ORAL DAILY
Qty: 90 TABLET | Refills: 3 | Status: SHIPPED | OUTPATIENT
Start: 2022-11-20 | End: 2024-02-02

## 2023-07-05 DIAGNOSIS — Z30.41 ORAL CONTRACEPTIVE PILL SURVEILLANCE: ICD-10-CM

## 2023-07-06 RX ORDER — DESOGESTREL AND ETHINYL ESTRADIOL 0.15-0.03
KIT ORAL
Qty: 112 TABLET | Refills: 1 | Status: SHIPPED | OUTPATIENT
Start: 2023-07-06 | End: 2023-12-20

## 2023-10-03 ENCOUNTER — PATIENT OUTREACH (OUTPATIENT)
Dept: CARE COORDINATION | Facility: CLINIC | Age: 43
End: 2023-10-03
Payer: COMMERCIAL

## 2023-10-17 ENCOUNTER — PATIENT OUTREACH (OUTPATIENT)
Dept: CARE COORDINATION | Facility: CLINIC | Age: 43
End: 2023-10-17
Payer: COMMERCIAL

## 2023-11-07 ENCOUNTER — HOSPITAL ENCOUNTER (EMERGENCY)
Facility: CLINIC | Age: 43
Discharge: HOME | End: 2023-11-07
Attending: EMERGENCY MEDICINE | Admitting: EMERGENCY MEDICINE
Payer: COMMERCIAL

## 2023-11-07 ENCOUNTER — NURSE TRIAGE (OUTPATIENT)
Dept: FAMILY MEDICINE | Facility: CLINIC | Age: 43
End: 2023-11-07
Payer: COMMERCIAL

## 2023-11-07 VITALS
DIASTOLIC BLOOD PRESSURE: 85 MMHG | OXYGEN SATURATION: 100 % | RESPIRATION RATE: 20 BRPM | SYSTOLIC BLOOD PRESSURE: 135 MMHG | WEIGHT: 192.4 LBS | TEMPERATURE: 98.2 F | BODY MASS INDEX: 30.74 KG/M2 | HEART RATE: 90 BPM

## 2023-11-07 DIAGNOSIS — K64.4 EXTERNAL HEMORRHOIDS: ICD-10-CM

## 2023-11-07 PROCEDURE — 99283 EMERGENCY DEPT VISIT LOW MDM: CPT | Performed by: EMERGENCY MEDICINE

## 2023-11-07 ASSESSMENT — ACTIVITIES OF DAILY LIVING (ADL): ADLS_ACUITY_SCORE: 35

## 2023-11-07 NOTE — ED TRIAGE NOTES
She has had a hemorrhoid for th e past 5 months the is bothering her and she can't get into the clinic for another 2 weeks

## 2023-11-07 NOTE — TELEPHONE ENCOUNTER
SITUATION:   Hemorrhoids    BACKGROUND:   Symptoms have been present since 2023 Spring.   The patient is sore and is sore to sitting. Itching, burning, and swelling.   There is a bump on the outside of the rectum.     HOME TREATMENTS:  Whitchhazel Pad  Prep-H  [facility]    NURSE RECOMMENDATION:       Sit in warm bath or sitz bath for 10 minutes several times a day to relieve discomfort and swelling, especially after painful bowel movements.   Clean rectal area with soft tissue or moist wipes after each bowel movement.   To relieve itching, apply cold compress to the area for 10 minutes, 4 times a day.   Apply Zinc Oxide or Petroleum jelly to the clean dry area to help reduce irritation and to ease passage of stool. Follow instructions on the label.   If no relief with these measure, try OTC medications (hemorrhoid suppositories, hydrocortisone (0.5% strength), Tucks). Follow instructions on the label. Products with witch hazel may help to reduce discomforts. Products with hydrocortisone help to reduce itching.   Avoid prolong sitting, standing, lifting, or straining.   Wear cotton clothing and loose underwear.   Avoid straining during bowel movements. Take your time, but avoid sitting for more than 2 minutes. Get up, do something else and return when it feels easier.   To help prevent constipation, drink plenty of water and eat a diet high in fiber (fruits, vegetables, and whole grain cereal).      PLAN:   The patient was encouraged to go into UC.   Keep appointment scheduled on 11/20/23.     Tommy Funes, MSN, RN, PHN  [place]  November 7, 2023    Reason for Disposition   MODERATE-SEVERE rectal pain (i.e., interferes with school, work, or sleep)    Additional Information   Negative: Diarrhea is main symptom   Negative: Constipation is main symptom (e.g., pain or discomfort caused by passage of hard BMs)   Negative: Blood in or on bowel movement is main symptom   Negative: MPOX  SUSPECTED (e.g., direct skin contact such as sex, recent travel to [place] or [place]) and any SYMPTOMS OF MPOX (e.g., rash, fever, muscle aches, or swollen lymph nodes)   Negative: At risk for Mpox (men-who-have-sex-with-men) and possible exposure (e.g., multiple sex partners in past 21 days) and ANY SYMPTOMS OF MPOX (e.g., rash, fever, muscle aches, or swollen lymph nodes)   Negative: Sexual assault or rape (sexual intercourse or activity occurs without freely given consent), known or suspected   Negative: Injury to rectum   Negative: Patient sounds very sick or weak to the triager   Negative: Severe rectal pain   Negative: Rectal pain or redness and fever > 100.4 F (38.0 C)   Negative: Acute onset rectal pain and constipation (straining with rectal pressure or fullness), which is not relieved by Sitz bath or suppository    Protocols used: Rectal Symptoms-A-OH

## 2023-11-08 NOTE — DISCHARGE INSTRUCTIONS
Continue Preparation H, sitz bath's, follow-up with general surgery.  You are okay to go on your trip.  Return to the ER if new or worsening symptoms.  You could have internal hemorrhoids which they will look at when you go to general surgery.  Continue taking fiber supplements to avoid constipation and drink plenty of water.  I hope you have a good trip.

## 2023-11-08 NOTE — ED PROVIDER NOTES
"  History     Chief Complaint   Patient presents with    Hemorrhoids     HPI  Estefania Arguello is a 43 year old female who presents emergency department secondary to hemorrhoids.  She has had hemorrhoids for several months and mostly has symptoms of burning and itching.  She has not had significant pain.  It is mostly irritating.  She has used witch hazel wipes and occasionally gets a little bit of blood but has not had significant bleeding.  She did not notice these hemorrhoids after pregnancy and delivering her baby but has developed and started bothering her over the last few months.  She cannot get in with her primary care provider for a couple of weeks.  She plans on traveling to [place] the next couple days.  She has not been constipated.  She is aware of the causes of hemorrhoids and has been trying to avoid them after speaking with her sister who got hemorrhoids during her pregnancy.  No blood in her stool or diarrhea.  No abdominal pain or fever.    Allergies:  No Known Allergies    Problem List:    Patient Active Problem List    Diagnosis Date Noted    History of gestational diabetes mellitus (GDM) 11/20/2022     Priority: Medium    Class 1 obesity due to excess calories without serious comorbidity with body mass index (BMI) of 32.0 to 32.9 in adult 11/20/2022     Priority: Medium    Hypothyroidism, unspecified type 10/20/2020     Priority: Medium        Past Medical History:    Past Medical History:   Diagnosis Date    Back pain     Hypothyroidism     Seasonal allergies        Past Surgical History:    Past Surgical History:   Procedure Laterality Date    DILATION AND CURETTAGE SUCTION N/A 3/8/2022    Procedure: DILATION AND CURETTAGE, UTERUS, USING SUCTION;  Surgeon: Bairon Thomas MD;  Location: [facility] OR    EYE SURGERY      \"benign tumor above right eye\"    MOUTH SURGERY      \"benign tumor under molar removed\"       Family History:    Family History   Problem Relation Age of Onset    Diabetes " Mother     Thyroid Disease Mother     Other - See Comments Mother         B12 deficiancy    Hyperlipidemia Mother     No Known Problems Father     No Known Problems Sister     No Known Problems Brother     Skin Cancer Maternal Grandmother     Cancer Maternal Grandmother         skin    Thyroid Disease Maternal Grandmother     Emphysema Maternal Grandfather     Hypertension Paternal Grandmother     Thyroid Disease Paternal Grandmother     Emphysema Paternal Grandfather     No Known Problems Daughter     Cancer Maternal Aunt         stomach    Cancer Maternal Uncle         stomach    Lymphoma Paternal Aunt         intravascular    ALS Paternal Uncle        Social History:  Marital Status:  Single [1]  Social History     Tobacco Use    Smoking status: Former     Types: Cigarettes     Quit date: 2018     Years since quittin.3    Smokeless tobacco: Former   Vaping Use    Vaping Use: Never used   Substance Use Topics    Alcohol use: Not Currently    Drug use: Never        Medications:    APRI 0.15-30 MG-MCG tablet  levothyroxine (SYNTHROID/LEVOTHROID) 100 MCG tablet          Review of Systems    Physical Exam   BP: 135/85  Pulse: 90  Temp: 98.2  F (36.8  C)  Resp: 18  Weight: 87.3 kg (192 lb 6.4 oz)  SpO2: 100 %      Physical Exam  Vitals and nursing note reviewed.   Constitutional:       General: She is not in acute distress.     Appearance: Normal appearance. She is well-developed.   HENT:      Head: Normocephalic and atraumatic.      Right Ear: External ear normal.      Left Ear: External ear normal.   Eyes:      General: No scleral icterus.     Conjunctiva/sclera: Conjunctivae normal.   Cardiovascular:      Rate and Rhythm: Normal rate.   Pulmonary:      Effort: Pulmonary effort is normal. No respiratory distress.   Abdominal:      General: Abdomen is flat.   Genitourinary:     Comments: Flesh-colored nonthrombosed external hemorrhoids present.  No evidence for rectal mass.  No bleeding.  Normal-appearing  rectum otherwise.  Musculoskeletal:         General: Normal range of motion.      Cervical back: Normal range of motion and neck supple.      Right lower leg: No edema.      Left lower leg: No edema.   Skin:     General: Skin is warm and dry.      Findings: No rash.   Neurological:      Mental Status: She is alert and oriented to person, place, and time.   Psychiatric:         Mood and Affect: Mood normal.         ED Course                 Procedures                  No results found for this or any previous visit (from the past 24 hour(s)).    Medications - No data to display    Assessments & Plan (with Medical Decision Making)  43-year-old with nonthrombosed external hemorrhoids.  Patient advised to do sitz bath, continue Preparation H, follow-up with general surgery.  Referral was placed.  She was advised that she could have internal hemorrhoids.  She can be further examined by general surgery.  Return to ER precautions and fall precautions discussed.  All questions answered prior to discharge.     I have reviewed the nursing notes.    I have reviewed the findings, diagnosis, plan and need for follow up with the patient.          Discharge Medication List as of 11/7/2023  7:35 PM          Final diagnoses:   External hemorrhoids       11/7/2023   [facility] EMERGENCY DEPT       Tyrell Perales MD  11/07/23 2050

## 2023-11-15 ENCOUNTER — TELEPHONE (OUTPATIENT)
Dept: FAMILY MEDICINE | Facility: CLINIC | Age: 43
End: 2023-11-15

## 2023-11-15 ENCOUNTER — ALLIED HEALTH/NURSE VISIT (OUTPATIENT)
Dept: FAMILY MEDICINE | Facility: CLINIC | Age: 43
End: 2023-11-15
Payer: COMMERCIAL

## 2023-11-15 DIAGNOSIS — Z80.0 FAMILY HISTORY- STOMACH CANCER: Primary | ICD-10-CM

## 2023-11-15 DIAGNOSIS — Z23 ENCOUNTER FOR IMMUNIZATION: Primary | ICD-10-CM

## 2023-11-15 PROCEDURE — 99207 PR NO CHARGE NURSE ONLY: CPT

## 2023-11-15 PROCEDURE — 90471 IMMUNIZATION ADMIN: CPT

## 2023-11-15 PROCEDURE — 90686 IIV4 VACC NO PRSV 0.5 ML IM: CPT

## 2023-11-15 NOTE — PROGRESS NOTES
Prior to immunization administration, verified patients identity using patient s name and date of birth. Please see Immunization Activity for additional information.     Screening Questionnaire for Adult Immunization    Are you sick today?   No   Do you have allergies to medications, food, a vaccine component or latex?   No   Have you ever had a serious reaction after receiving a vaccination?   No   Do you have a long-term health problem with heart, lung, kidney, or metabolic disease (e.g., diabetes), asthma, a blood disorder, no spleen, complement component deficiency, a cochlear implant, or a spinal fluid leak?  Are you on long-term aspirin therapy?   No   Do you have cancer, leukemia, HIV/AIDS, or any other immune system problem?   No   Do you have a parent, brother, or sister with an immune system problem?   No   In the past 3 months, have you taken medications that affect  your immune system, such as prednisone, other steroids, or anticancer drugs; drugs for the treatment of rheumatoid arthritis, Crohn s disease, or psoriasis; or have you had radiation treatments?   No   Have you had a seizure, or a brain or other nervous system problem?   No   During the past year, have you received a transfusion of blood or blood    products, or been given immune (gamma) globulin or antiviral drug?   No   For women: Are you pregnant or is there a chance you could become       pregnant during the next month?   No   Have you received any vaccinations in the past 4 weeks?   No     Immunization questionnaire answers were all negative.    I have reviewed the following standing orders:   This patient is due and qualifies for the Influenza vaccine.    Click here for Influenza Vaccine Standing Order    I have reviewed the vaccines inclusion and exclusion criteria; No concerns regarding eligibility.     Patient instructed to remain in clinic for 15 minutes afterwards, and to report any adverse reactions.     Screening performed by  Silvana Stinson MA on 11/15/2023 at 2:50 PM.

## 2023-11-16 ENCOUNTER — OFFICE VISIT (OUTPATIENT)
Dept: SURGERY | Facility: CLINIC | Age: 43
End: 2023-11-16
Payer: COMMERCIAL

## 2023-11-16 VITALS
HEART RATE: 81 BPM | RESPIRATION RATE: 16 BRPM | HEIGHT: 66 IN | SYSTOLIC BLOOD PRESSURE: 139 MMHG | DIASTOLIC BLOOD PRESSURE: 88 MMHG | TEMPERATURE: 97.2 F | WEIGHT: 192.3 LBS | OXYGEN SATURATION: 99 % | BODY MASS INDEX: 30.91 KG/M2

## 2023-11-16 DIAGNOSIS — K64.4 EXTERNAL HEMORRHOIDS: ICD-10-CM

## 2023-11-16 DIAGNOSIS — K62.5 RECTAL BLEEDING: ICD-10-CM

## 2023-11-16 DIAGNOSIS — K64.8 INTERNAL HEMORRHOIDS: ICD-10-CM

## 2023-11-16 DIAGNOSIS — K60.2 ANAL FISSURE: Primary | ICD-10-CM

## 2023-11-16 DIAGNOSIS — Z80.0 FAMILY HISTORY OF GASTRIC CANCER: ICD-10-CM

## 2023-11-16 PROCEDURE — 99203 OFFICE O/P NEW LOW 30 MIN: CPT | Mod: 25 | Performed by: SPECIALIST

## 2023-11-16 PROCEDURE — 46600 DIAGNOSTIC ANOSCOPY SPX: CPT | Performed by: SPECIALIST

## 2023-11-16 ASSESSMENT — PAIN SCALES - GENERAL: PAINLEVEL: MILD PAIN (2)

## 2023-11-16 NOTE — LETTER
"    11/16/2023         RE: Estefania Arguello  [address] [facility] [address]        Dear Colleague,    Thank you for referring your patient, Estefania Arguello, to the [facility]. Please see a copy of my visit note below.    Consult requested by Dr. Perales    Reason for consultation: Hemorrhoids    HPI:  Patient is a 43-year-old white female who 6 months ago developed some hemorrhoids.  She has some pain and occasional blood when she wipes.  She has been treating it with witch hazel and over-the-counter creams.  She is not sure if she can feel something back there.  She has never had a colonoscopy.  There is no family history of colon cancer.  She was seen in the ER and told she has hemorrhoids.  She now follows up for possible hemorrhoids.      In addition, she is scheduled for an EGD for family history of gastric cancer.      Past Medical History:   Diagnosis Date     Back pain      Hypothyroidism      Seasonal allergies      Past Surgical History:   Procedure Laterality Date     DILATION AND CURETTAGE SUCTION N/A 3/8/2022    Procedure: DILATION AND CURETTAGE, UTERUS, USING SUCTION;  Surgeon: Bairon Thomas MD;  Location: [facility]     EYE SURGERY      \"benign tumor above right eye\"     MOUTH SURGERY      \"benign tumor under molar removed\"     Current Outpatient Medications   Medication     APRI 0.15-30 MG-MCG tablet     COMPOUNDED NON-CONTROLLED SUBSTANCE (CMPD RX) - PHARMACY TO MIX COMPOUNDED MEDICATION     levothyroxine (SYNTHROID/LEVOTHROID) 100 MCG tablet     No current facility-administered medications for this visit.      No Known Allergies    Social History     Socioeconomic History     Marital status: Single     Spouse name: Marcelo     Number of children: 1     Years of education: Not on file     Highest education level: Not on file   Occupational History     Occupation: PCA, Homemaking, [employer] for [employer]   Tobacco Use     Smoking status: Former     Types: " Cigarettes     Quit date: 2018     Years since quittin.3     Smokeless tobacco: Former   Vaping Use     Vaping Use: Never used   Substance and Sexual Activity     Alcohol use: Not Currently     Drug use: Never     Sexual activity: Yes     Partners: Male   Other Topics Concern     Not on file   Social History Narrative    2022  Lives in [place] with S.Marcelo AGUILAR and daughter, Lissy.   No indoor cats/kittens.  No concerns about domestic violence.  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     Social Determinants of Health     Financial Resource Strain: Not on file   Food Insecurity: Not on file   Transportation Needs: Not on file   Physical Activity: Not on file   Stress: Not on file   Social Connections: Not on file   Interpersonal Safety: Not on file   Housing Stability: Not on file     Family History   Problem Relation Age of Onset     Diabetes Mother      Thyroid Disease Mother      Other - See Comments Mother         B12 deficiancy     Hyperlipidemia Mother      No Known Problems Father      No Known Problems Sister      No Known Problems Brother      Skin Cancer Maternal Grandmother      Cancer Maternal Grandmother         skin     Thyroid Disease Maternal Grandmother      Emphysema Maternal Grandfather      Hypertension Paternal Grandmother      Thyroid Disease Paternal Grandmother      Emphysema Paternal Grandfather      No Known Problems Daughter      Cancer Maternal Aunt         stomach     Cancer Maternal Uncle         stomach     Lymphoma Paternal Aunt         intravascular     ALS Paternal Uncle         ROS: 10 point ROS neg other than the symptoms noted above in the HPI.    PE:  B/P: 139/88, T: 97.2, P: 81, R: 16  General: well developed, well nourished WF who appears their stated age  HEENT: NC/AT, EOMI, (-)icterus, (-)injection  Neck: Supple, No JVD  Chest: CTA  Heart: S1, S2, (-)m/r/g  Abd: Soft, non tender, non distended, non tender, no masses  Rectal: No masses, residual anterior tag, partially healed  posterior anal fissure.  Anoscopy: Minimal internal hemorrhoids.  No masses.  Ext; Warm, no edema  Psych: AAOx3  Neuro: No focal deficits      Impression/plan:  This is a 43 lady presenting with rectal pain and itching.  On exam she does have evidence of a partially healed fissure.  In addition she does have some internal hemorrhoids along with some rectal bleeding.  She is currently awaiting an EGD for a family history of gastric cancer.  I discussed the etiology of fissures as well as treatment plans.  I also recommended a colonoscopy due to her age and rectal bleeding.  She is in agreement with that.  After discussion with the patient the plan at this time to start her on nifedipine ointment and schedule for an EGD and colonoscopy with myself.   The procedure, risks, benefits, and alternatives were discussed and the patient agrees to proceed.  She will follow-up in 4 weeks to see how she is doing with the nifedipine ointment.      Robi Soto MD, FACS          Again, thank you for allowing me to participate in the care of your patient.        Sincerely,        Robi Soto MD

## 2023-11-16 NOTE — PROGRESS NOTES
"Consult requested by Dr. Perales    Reason for consultation: Hemorrhoids    HPI:  Patient is a 43-year-old white female who 6 months ago developed some hemorrhoids.  She has some pain and occasional blood when she wipes.  She has been treating it with witch hazel and over-the-counter creams.  She is not sure if she can feel something back there.  She has never had a colonoscopy.  There is no family history of colon cancer.  She was seen in the ER and told she has hemorrhoids.  She now follows up for possible hemorrhoids.      In addition, she is scheduled for an EGD for family history of gastric cancer.      Past Medical History:   Diagnosis Date    Back pain     Hypothyroidism     Seasonal allergies      Past Surgical History:   Procedure Laterality Date    DILATION AND CURETTAGE SUCTION N/A 3/8/2022    Procedure: DILATION AND CURETTAGE, UTERUS, USING SUCTION;  Surgeon: Bairon Thomas MD;  Location: [facility]    EYE SURGERY      \"benign tumor above right eye\"    MOUTH SURGERY      \"benign tumor under molar removed\"     Current Outpatient Medications   Medication    APRI 0.15-30 MG-MCG tablet    COMPOUNDED NON-CONTROLLED SUBSTANCE (CMPD RX) - PHARMACY TO MIX COMPOUNDED MEDICATION    levothyroxine (SYNTHROID/LEVOTHROID) 100 MCG tablet     No current facility-administered medications for this visit.      No Known Allergies    Social History     Socioeconomic History    Marital status: Single     Spouse name: Marcelo    Number of children: 1    Years of education: Not on file    Highest education level: Not on file   Occupational History    Occupation: PCA, Homemaking, [employer] for disabled clients   Tobacco Use    Smoking status: Former     Types: Cigarettes     Quit date: 2018     Years since quittin.3    Smokeless tobacco: Former   Vaping Use    Vaping Use: Never used   Substance and Sexual Activity    Alcohol use: Not Currently    Drug use: Never    Sexual activity: Yes     Partners: Male   Other Topics Concern    " Not on file   Social History Narrative    1/2022  Lives in [place] with S.O., Marcelo and daughter, Lissy.   No indoor cats/kittens.  No concerns about domestic violence.  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     Social Determinants of Health     Financial Resource Strain: Not on file   Food Insecurity: Not on file   Transportation Needs: Not on file   Physical Activity: Not on file   Stress: Not on file   Social Connections: Not on file   Interpersonal Safety: Not on file   Housing Stability: Not on file     Family History   Problem Relation Age of Onset    Diabetes Mother     Thyroid Disease Mother     Other - See Comments Mother         B12 deficiancy    Hyperlipidemia Mother     No Known Problems Father     No Known Problems Sister     No Known Problems Brother     Skin Cancer Maternal Grandmother     Cancer Maternal Grandmother         skin    Thyroid Disease Maternal Grandmother     Emphysema Maternal Grandfather     Hypertension Paternal Grandmother     Thyroid Disease Paternal Grandmother     Emphysema Paternal Grandfather     No Known Problems Daughter     Cancer Maternal Aunt         stomach    Cancer Maternal Uncle         stomach    Lymphoma Paternal Aunt         intravascular    ALS Paternal Uncle         ROS: 10 point ROS neg other than the symptoms noted above in the HPI.    PE:  B/P: 139/88, T: 97.2, P: 81, R: 16  General: well developed, well nourished WF who appears their stated age  HEENT: NC/AT, EOMI, (-)icterus, (-)injection  Neck: Supple, No JVD  Chest: CTA  Heart: S1, S2, (-)m/r/g  Abd: Soft, non tender, non distended, non tender, no masses  Rectal: No masses, residual anterior tag, partially healed posterior anal fissure.  Anoscopy: Minimal internal hemorrhoids.  No masses.  Ext; Warm, no edema  Psych: AAOx3  Neuro: No focal deficits      Impression/plan:  This is a 43 lady presenting with rectal pain and itching.  On exam she does have evidence of a partially healed fissure.  In addition she does  have some internal hemorrhoids along with some rectal bleeding.  She is currently awaiting an EGD for a family history of gastric cancer.  I discussed the etiology of fissures as well as treatment plans.  I also recommended a colonoscopy due to her age and rectal bleeding.  She is in agreement with that.  After discussion with the patient the plan at this time to start her on nifedipine ointment and schedule for an EGD and colonoscopy with myself.   The procedure, risks, benefits, and alternatives were discussed and the patient agrees to proceed.  She will follow-up in 4 weeks to see how she is doing with the nifedipine ointment.      Robi Soto MD, FACS

## 2023-11-16 NOTE — TELEPHONE ENCOUNTER
Estefania is here with her daughter at her well-child check.  Last year we talked about doing an EGD because of a family history of cancer but the order was never placed.  I agreed to put an order in.  Also we talked a bit about weight loss medications.  She is working hard on it, previously did Noom and lost a bit of weight but has gained a little bit of it back.  Would like to prevent further weight gain.  She has a hard time avoiding snacking in the evening.  She would like to consider phentermine or Wegovy.  She would like to check her insurance coverage.  I advised her to check and let me know if she is interested and we can set up a virtual visit between now and December.  She will be coming in tomorrow to see the surgeon regarding some hemorrhoids and I will have her get a weight and height checked there.  Anyi Holder MD

## 2023-11-18 ENCOUNTER — TELEPHONE (OUTPATIENT)
Dept: FAMILY MEDICINE | Facility: CLINIC | Age: 43
End: 2023-11-18

## 2023-11-18 DIAGNOSIS — Z86.32 HISTORY OF GESTATIONAL DIABETES MELLITUS (GDM): ICD-10-CM

## 2023-11-18 DIAGNOSIS — E66.09 CLASS 1 OBESITY DUE TO EXCESS CALORIES WITHOUT SERIOUS COMORBIDITY WITH BODY MASS INDEX (BMI) OF 31.0 TO 31.9 IN ADULT: Primary | ICD-10-CM

## 2023-11-18 DIAGNOSIS — E66.811 CLASS 1 OBESITY DUE TO EXCESS CALORIES WITHOUT SERIOUS COMORBIDITY WITH BODY MASS INDEX (BMI) OF 31.0 TO 31.9 IN ADULT: Primary | ICD-10-CM

## 2023-11-19 NOTE — TELEPHONE ENCOUNTER
Estefania and I talked at her child's visit the other day about weight loss medication.  She has checked with her insurance and Wegovy is covered.  She would like to try it.  I am sending in a 1 month prescription for the 0.25 and then I'd like her to follow-up at that time, will likely need to be with another provider in my absence, and if she is tolerating from a GI standpoint we will bump her up according to protocol until she is at therapeutic dose.  We previously discussed side effects and potential for weight regain after stopping medication.  Need to incorporate healthy lifestyle changes for long-lasting benefit.  Anyi Holder MD

## 2023-11-20 ENCOUNTER — TELEPHONE (OUTPATIENT)
Dept: SURGERY | Facility: CLINIC | Age: 43
End: 2023-11-20

## 2023-11-20 ENCOUNTER — MYC MEDICAL ADVICE (OUTPATIENT)
Dept: FAMILY MEDICINE | Facility: CLINIC | Age: 43
End: 2023-11-20

## 2023-11-20 NOTE — TELEPHONE ENCOUNTER
A prior authorization is needed for the following compounded medications prescribed.  Please complete a prior authorization with the information included below.    Medication:NIFEDIPINE 0.3% + LIDO 1.5 % in white pet oint    Ingredients                                                                  NDCs                                                Quantities                       Nifedipine POWD                                                   [address]-15                                                 0.18 g  Lidocaine POWD                                                 [address]-26                                              0.9 g  WHITE PET OINT                                               [address]-58                                           58.920 g        RX #:[id number]  Reason for Rejection:PRODUCT NOT ON FORM    Pharmacy Insurance plan:[facility]  BIN #:[id number]  ID #:[id number]  PCN #:[id number]  Phone #:[phone]      Pharmacy NPI:[id number]      Please advise the pharmacy when the prior authorization is approved or denied.     Thank you for your time.        Jair Thurman    [facility]   [address] [place] [address]   Phone: [phone]  Fax: [phone]

## 2023-11-21 ENCOUNTER — TELEPHONE (OUTPATIENT)
Dept: GASTROENTEROLOGY | Facility: CLINIC | Age: 43
End: 2023-11-21
Payer: COMMERCIAL

## 2023-11-21 NOTE — TELEPHONE ENCOUNTER
"Endoscopy Scheduling Screen    Have you had a positive Covid test in the last 14 days?  No    Are you active on MyChart?   Yes    What insurance is in the chart?  Other:      Ordering/Referring Provider: Brittany   (If ordering provider performs procedure, schedule with ordering provider unless otherwise instructed. )    BMI: Estimated body mass index is 31.51 kg/m  as calculated from the following:    Height as of 11/16/23: 1.664 m (5' 5.5\").    Weight as of 11/16/23: 87.2 kg (192 lb 4.8 oz).     Sedation Ordered  moderate sedation.   If patient BMI > 50 do not schedule in [facility].    If patient BMI > 45 do not schedule at [facility].    Are you taking methadone or Suboxone?  No    Are you taking any prescription medications for pain 3 or more times per week?   No    Do you have a history of malignant hyperthermia or adverse reaction to anesthesia?  No    (Females) Are you currently pregnant?   No     Have you been diagnosed or told you have pulmonary hypertension?   No    Do you have an LVAD?  No    Have you been told you have moderate to severe sleep apnea?  No    Have you been told you have COPD, asthma, or any other lung disease?  No    Do you have any heart conditions?  No     Have you ever had an organ transplant?   No    Have you ever had or are you awaiting a heart or lung transplant?   No    Have you had a stroke or transient ischemic attack (TIA aka \"mini stroke\" in the last 6 months?   No    Have you been diagnosed with or been told you have cirrhosis of the liver?   No    Are you currently on dialysis?   No    Do you need assistance transferring?   No    BMI: Estimated body mass index is 31.51 kg/m  as calculated from the following:    Height as of 11/16/23: 1.664 m (5' 5.5\").    Weight as of 11/16/23: 87.2 kg (192 lb 4.8 oz).     Is patients BMI > 40 and scheduling location [facility]?  No    Do you take an injectable medication for weight loss or diabetes (excluding insulin)?  No    Do you take the medication " Naltrexone?  No    Do you take blood thinners?  No       Prep   Are you currently on dialysis or do you have chronic kidney disease?  No    Do you have a diagnosis of diabetes?  No    Do you have a diagnosis of cystic fibrosis (CF)?  No    On a regular basis do you go 3 -5 days between bowel movements?  No    BMI > 40?  No    Preferred Pharmacy:      [id number] - [place], [place] - [address]  [address] [facility] [address] [facility] [address]  Phone: [phone] Fax: [phone]      Final Scheduling Details   Colonoscopy prep sent?  Standard MiraLAX    Procedure scheduled  Colonoscopy / Upper endoscopy (EGD)    Surgeon:  Brittany     Date of procedure:  1/9/24     Pre-OP / PAC:   No - Not required for this site.    Location  [facility] - Patient preference.    Sedation   MAC/Deep Sedation  Location      Patient Reminders:   You will receive a call from a Nurse to review instructions and health history.  This assessment must be completed prior to your procedure.  Failure to complete the Nurse assessment may result in the procedure being cancelled.      On the day of your procedure, please designate an adult(s) who can drive you home stay with you for the next 24 hours. The medicines used in the exam will make you sleepy. You will not be able to drive.      You cannot take public transportation, ride share services, or non-medical taxi service without a responsible caregiver.  Medical transport services are allowed with the requirement that a responsible caregiver will receive you at your destination.  We require that drivers and caregivers are confirmed prior to your procedure.

## 2023-11-24 NOTE — TELEPHONE ENCOUNTER
Central Prior Authorization Team  Phone: [phone]    PA Initiation    Medication:    Insurance Company: [employer] - Phone [phone] Fax [phone]  Pharmacy Filling the Rx: [facility] - [place], [place] - [address]  Filling Pharmacy Phone: [phone]  Filling Pharmacy Fax:    Start Date: 11/24/2023

## 2023-11-24 NOTE — TELEPHONE ENCOUNTER
Central Prior Authorization Team  Phone: [phone]    Prior Authorization Approval    Medication:    Authorization Effective Date: 10/24/2023  Authorization Expiration Date: 11/24/2024  Approved Dose/Quantity:   Reference #:     Insurance Company: [employer] - Phone [phone] Fax [phone]  Expected CoPay: $    CoPay Card Available:      Financial Assistance Needed:   Which Pharmacy is filling the prescription: [facility] - [place], [place] - [address]  Pharmacy Notified: yes   Patient Notified: PHARMACY WILL NOTIFY PT WHEN READY

## 2023-12-04 ENCOUNTER — MYC MEDICAL ADVICE (OUTPATIENT)
Dept: FAMILY MEDICINE | Facility: CLINIC | Age: 43
End: 2023-12-04
Payer: COMMERCIAL

## 2023-12-14 ENCOUNTER — OFFICE VISIT (OUTPATIENT)
Dept: SURGERY | Facility: CLINIC | Age: 43
End: 2023-12-14
Payer: COMMERCIAL

## 2023-12-14 VITALS
WEIGHT: 192 LBS | SYSTOLIC BLOOD PRESSURE: 122 MMHG | TEMPERATURE: 97.2 F | DIASTOLIC BLOOD PRESSURE: 80 MMHG | BODY MASS INDEX: 31.46 KG/M2

## 2023-12-14 DIAGNOSIS — K60.2 ANAL FISSURE: Primary | ICD-10-CM

## 2023-12-14 PROCEDURE — 99213 OFFICE O/P EST LOW 20 MIN: CPT | Performed by: SPECIALIST

## 2023-12-14 ASSESSMENT — PAIN SCALES - GENERAL: PAINLEVEL: NO PAIN (0)

## 2023-12-14 NOTE — LETTER
12/14/2023         RE: Estefania Arguello  [id number] [facility] [id number]        Dear Colleague,    Thank you for referring your patient, Estefania Arguello, to the [facility]. Please see a copy of my visit note below.    Follow-up for anal fissure    Subjective:  Patient is a 43-year-old white female who 6 months ago developed some hemorrhoids.  She has some pain and occasional blood when she wipes.  She has been treating it with witch hazel and over-the-counter creams.  She is not sure if she can feel something back there.  She has never had a colonoscopy.  There is no family history of colon cancer.  She was seen in the ER and told she has hemorrhoids.     She has been using nifedipine ointment for the past 4 weeks and is feeling much better.  She now only has minimal itching at the end of the day.  Her pain is resolved.  She is scheduled for colonoscopy and EGD in early January.      Objective:  B/P: 122/80, T: 97.2, P: Data Unavailable, R: Data Unavailable  Rectal: Deferred as patient is much improved.          Assessment/plan:  This is a 43 lady with an anal fissure.  By report is a much improved with the nifedipine.  Will continue the nifedipine for another 4 weeks.  Proceed to colonoscopy and EGD in early January.  The fissure can be reevaluated at that time.  Should she not feel healed then consideration can be given to Botox injection.    Robi Soto MD, FACS          Again, thank you for allowing me to participate in the care of your patient.        Sincerely,        Robi Soto MD   Reason For Visit  Candace Scanlon is a(n) 56 year old patient here today for post op visit.         SUBJECTIVE  Patient is known to me.  Back in August of this year I excised a rather large sebaceous cyst from the area over her sternum.  It seems to be doing okay.  She had some concerns about the scar which is bit  Hypertrophic at this time to a certain extent.  I would not call it a keloid.  I also am not convinced there is still any underlying cystic material to reexcise here.  I told her that we should leave it alone for at least a year before we commit her to a reexcision.  She would like to come back to see me in May as she is moving to [place] and I said that is fine.  Certainly,  whatever needs to be done can be done here in the office but I told her that there is a good chance this scar may remodeling and improve over the next few months to where she would need anything more done.         Active Problems  Patient Active Problem List   Diagnosis   • High cholesterol   • Esophageal reflux   • Vitamin D deficiency   • Hematuria   • Anemia   • DJD (degenerative joint disease), cervical   • Chronic bilateral low back pain without sciatica since 2006   • Lumbar radiculopathy   • Increased urinary frequency   • Heart palpitations   • Constipation   • Elevated alkaline phosphatase level   • Lumbar herniated disc   • Iron deficiency anemia   • Migraine headache   • OAB (overactive bladder)   • SVT (supraventricular tachycardia) (CMS/HCC)   • Tingling in extremities   • Vision changes   • Elevated liver enzymes   • Right knee pain   • Arthritis of right knee   • Atrial septal aneurysm   • Cough   • Visit for screening mammogram   • Vaccine for tetanus toxoid   • Chest discomfort   • Intermittent claudication (CMS/HCC)   • Infected sebaceous cyst   • Dyspnea   • Leg swelling   • Ingrown right big toenail   • Hospital discharge follow-up   • Right leg pain         Review  Past medical history, problem list, family  medical history, surgical history and social history reviewed.       Current Meds  Current Outpatient Medications   Medication Sig Dispense Refill   • furosemide (LASIX) 20 MG tablet Take 1 tablet by mouth daily. 30 tablet 3   • potassium chloride (KLOR-CON) 10 MEQ ER tablet Take 1 tablet by mouth 2 times daily. 30 tablet 3   • traMADol (ULTRAM) 50 MG tablet Take 1 tablet by mouth every 8 hours as needed for Pain. 24 tablet 0   • metoPROLOL succinate (TOPROL-XL) 25 MG 24 hr tablet Take 1 tablet by mouth daily. 90 tablet 3   • atorvastatin (LIPITOR) 20 MG tablet Take 1 tablet by mouth daily. 30 tablet 3   • methocarbamol (ROBAXIN) 750 MG tablet TK 1 T PO Q 12 H PRN P     • ferrous sulfate 325 (65 FE) MG tablet Take 1 tablet by mouth daily. 30 tablet 6   • ASPIRIN 81 PO Take 81 mg by mouth daily.      • diclofenac (VOLTAREN) 1 % gel Apply to the affected area 4 times a day 300 g 3   • omeprazole (PRILOSEC) 40 MG capsule Take 1 capsule by mouth daily as needed (acid reflux). Indications: Gastroesophageal Reflux Disease 30 capsule 3   • gabapentin (NEURONTIN) 600 MG tablet Take 1 tablet by mouth 2 times daily. (Patient taking differently: Take 600 mg by mouth 2 times daily as needed. ) 60 tablet 6   • linaclotide (LINZESS) 290 MCG Take 290 mcg by mouth daily (before breakfast). 90 capsule 3   • MYRBETRIQ 50 MG 24 hr tablet TK 1 T PO D  3   • butalbital-acetaminophen-caffeine -40 MG capsule Take 1 capsule by mouth every 12 hours. (Patient taking differently: Take 2 capsules by mouth every 8 hours as needed (headache). ) 30 capsule 0     No current facility-administered medications for this visit.        Current Allergies      ALLERGIES:   Allergen Reactions   • Penicillins HIVES   • Latex HIVES   • Bactrim Ds VOMITING   • Clarithromycin VOMITING   • Naproxen Other (See Comments)   • Tramadol PRURITUS       Vitals  Visit Vitals  /68   Pulse 83   Temp 96.7 °F (35.9 °C)   Ht 5' 9\" (1.753 m)   Wt 83.5 kg (184 lb)    LMP  (LMP Unknown) Comment: 2007   BMI 27.17 kg/m²             OBJECTIVE  Some moderate prominence to the excision site over the sternum.  No obvious recurrence of cyst but hypertrophy of the scar.        Assessment  Status post excision of giant sebaceous cyst of the skin and soft tissue over the sternum      Plan  Patient will see me in May to reconsider scar revision.        Medical compliance with plan discussed and risks of non-compliance reviewed.    Patient education completed on disease process, etiology & prognosis.    Patient expresses understanding of the plan.    Return to clinic as clinically indicated as discussed with patient who verbalized understanding of & agreement with the plan.     Signatures  Shane Ceballos MD

## 2023-12-14 NOTE — PROGRESS NOTES
Follow-up for anal fissure    Subjective:  Patient is a 43-year-old white female who 6 months ago developed some hemorrhoids.  She has some pain and occasional blood when she wipes.  She has been treating it with witch hazel and over-the-counter creams.  She is not sure if she can feel something back there.  She has never had a colonoscopy.  There is no family history of colon cancer.  She was seen in the ER and told she has hemorrhoids.     She has been using nifedipine ointment for the past 4 weeks and is feeling much better.  She now only has minimal itching at the end of the day.  Her pain is resolved.  She is scheduled for colonoscopy and EGD in early January.      Objective:  B/P: 122/80, T: 97.2, P: Data Unavailable, R: Data Unavailable  Rectal: Deferred as patient is much improved.          Assessment/plan:  This is a 43 lady with an anal fissure.  By report is a much improved with the nifedipine.  Will continue the nifedipine for another 4 weeks.  Proceed to colonoscopy and EGD in early January.  The fissure can be reevaluated at that time.  Should she not feel healed then consideration can be given to Botox injection.    Robi Soto MD, FACS

## 2023-12-17 ENCOUNTER — HEALTH MAINTENANCE LETTER (OUTPATIENT)
Age: 43
End: 2023-12-17

## 2023-12-20 ENCOUNTER — MYC MEDICAL ADVICE (OUTPATIENT)
Dept: GASTROENTEROLOGY | Facility: HOSPITAL | Age: 43
End: 2023-12-20

## 2023-12-20 DIAGNOSIS — Z30.41 ORAL CONTRACEPTIVE PILL SURVEILLANCE: ICD-10-CM

## 2023-12-20 RX ORDER — DESOGESTREL AND ETHINYL ESTRADIOL 0.15-0.03
KIT ORAL
Qty: 112 TABLET | Refills: 0 | Status: SHIPPED | OUTPATIENT
Start: 2023-12-20 | End: 2024-02-19

## 2024-01-08 RX ORDER — LIDOCAINE 40 MG/G
CREAM TOPICAL
Status: CANCELLED | OUTPATIENT
Start: 2024-01-08

## 2024-01-08 NOTE — H&P
"[facility] History and Physical    Estefania Arguello MRN# [id number]   Age: 43 year old YOB: 1980     Date of Admission:  (Not on file)    Home clinic: [facility]  Primary care provider: Anyi Holder          Impression and Plan:   Impression:   Anal fissure [K60.2]  Rectal bleeding [K62.5]  Family history of gastric cancer [Z80.0]  No prior Endo's in system      Plan:   Proceed to EGD and Colonoscopy as planned.  The procedure, risks(bleeding, perforation), benefits and alternatives were discussed and the patient agrees to proceed. Cleared for Anesthesia             Chief Complaint:   Anal fissure [K60.2]  Rectal bleeding [K62.5]  Family history of gastric cancer [Z80.0]    History is obtained from the patient          History of Present Illness:   This 43 year old female is being seen at this time for evaluation for EGD and colonoscopy.  Well-known to me from clinic.  No new complaints or concerns.  Currently on nifedipine ointment for known fissure.  Fissure was doing well until prep       Past Medical History:     Past Medical History:   Diagnosis Date    Back pain     Hypothyroidism     Seasonal allergies             Past Surgical History:     Past Surgical History:   Procedure Laterality Date    DILATION AND CURETTAGE SUCTION N/A 3/8/2022    Procedure: DILATION AND CURETTAGE, UTERUS, USING SUCTION;  Surgeon: Bairon Thomas MD;  Location: [facility] OR    EYE SURGERY      \"benign tumor above right eye\"    MOUTH SURGERY      \"benign tumor under molar removed\"            Social History:     Social History     Tobacco Use    Smoking status: Former     Types: Cigarettes     Quit date: 2018     Years since quittin.5    Smokeless tobacco: Former   Substance Use Topics    Alcohol use: Not Currently            Family History:     Family History   Problem Relation Age of Onset    Diabetes Mother     Thyroid Disease Mother     Other - See Comments Mother "         B12 deficiancy    Hyperlipidemia Mother     No Known Problems Father     No Known Problems Sister     No Known Problems Brother     Skin Cancer Maternal Grandmother     Cancer Maternal Grandmother         skin    Thyroid Disease Maternal Grandmother     Emphysema Maternal Grandfather     Hypertension Paternal Grandmother     Thyroid Disease Paternal Grandmother     Emphysema Paternal Grandfather     No Known Problems Daughter     Cancer Maternal Aunt         stomach    Cancer Maternal Uncle         stomach    Lymphoma Paternal Aunt         intravascular    ALS Paternal Uncle             Immunizations:     VACCINE/DOSE   Diptheria   DPT   DTAP   HBIG   Hepatitis A   Hepatitis B   HIB   Influenza   Measles   Meningococcal   MMR   Mumps   Pneumococcal   Polio   Rubella   Small Pox   TDAP   Varicella   Zoster            Allergies:   No Known Allergies         Medications:     No current facility-administered medications for this encounter.     Current Outpatient Medications   Medication Sig    levothyroxine (SYNTHROID/LEVOTHROID) 100 MCG tablet Take 1 tablet (100 mcg) by mouth daily    APRI 0.15-30 MG-MCG tablet Take 1 tablet by mouth daily Active pills continuously, skip placebos    COMPOUNDED NON-CONTROLLED SUBSTANCE (CMPD RX) - PHARMACY TO MIX COMPOUNDED MEDICATION Please Compound 0.3% Nifedipine in 1.5% Lidocaine ointment  Apply 1 g topically to affected rectal area BID    Semaglutide-Weight Management (WEGOVY) 0.25 MG/0.5ML pen Inject 0.25 mg Subcutaneous once a week (Patient taking differently: Inject 0.25 mg Subcutaneous once a week Has not started)             Review of Systems:   The review of systems was positive for the following findings.  None.  The remainder of the review of systems was unremarkable.          Physical Exam:   All vitals have been reviewed  currently breastfeeding.  No intake or output data in the 24 hours ending 01/08/24 0915  SHEENT examination revealed NC/AT, EOMI.  Examination  of the chest revealed CTA.  Examination of the heart revealed RRR.  Examination of the abdomen revealed Soft, non tneder.  The neuromuscular examination was NL.          Data:   All laboratory data reviewed  No results found for any visits on 01/09/24.  -     Robi Soto MD, FACS

## 2024-01-09 ENCOUNTER — ANESTHESIA (OUTPATIENT)
Dept: GASTROENTEROLOGY | Facility: CLINIC | Age: 44
End: 2024-01-09
Payer: COMMERCIAL

## 2024-01-09 ENCOUNTER — HOSPITAL ENCOUNTER (OUTPATIENT)
Facility: CLINIC | Age: 44
Discharge: HOME | End: 2024-01-09
Attending: SPECIALIST | Admitting: SPECIALIST
Payer: COMMERCIAL

## 2024-01-09 ENCOUNTER — ANESTHESIA EVENT (OUTPATIENT)
Dept: GASTROENTEROLOGY | Facility: CLINIC | Age: 44
End: 2024-01-09
Payer: COMMERCIAL

## 2024-01-09 VITALS
RESPIRATION RATE: 16 BRPM | SYSTOLIC BLOOD PRESSURE: 138 MMHG | OXYGEN SATURATION: 100 % | WEIGHT: 192 LBS | DIASTOLIC BLOOD PRESSURE: 94 MMHG | HEART RATE: 69 BPM | TEMPERATURE: 98.8 F | BODY MASS INDEX: 31.46 KG/M2

## 2024-01-09 LAB
COLONOSCOPY: NORMAL
UPPER GI ENDOSCOPY: NORMAL

## 2024-01-09 PROCEDURE — 250N000011 HC RX IP 250 OP 636: Performed by: NURSE ANESTHETIST, CERTIFIED REGISTERED

## 2024-01-09 PROCEDURE — 45385 COLONOSCOPY W/LESION REMOVAL: CPT | Performed by: SPECIALIST

## 2024-01-09 PROCEDURE — 88305 TISSUE EXAM BY PATHOLOGIST: CPT | Mod: 26 | Performed by: PATHOLOGY

## 2024-01-09 PROCEDURE — 370N000017 HC ANESTHESIA TECHNICAL FEE, PER MIN: Performed by: SPECIALIST

## 2024-01-09 PROCEDURE — 250N000009 HC RX 250: Performed by: NURSE ANESTHETIST, CERTIFIED REGISTERED

## 2024-01-09 PROCEDURE — 88305 TISSUE EXAM BY PATHOLOGIST: CPT | Mod: TC | Performed by: SPECIALIST

## 2024-01-09 PROCEDURE — 88342 IMHCHEM/IMCYTCHM 1ST ANTB: CPT | Mod: 26 | Performed by: PATHOLOGY

## 2024-01-09 PROCEDURE — 43239 EGD BIOPSY SINGLE/MULTIPLE: CPT | Performed by: SPECIALIST

## 2024-01-09 PROCEDURE — 258N000003 HC RX IP 258 OP 636: Performed by: NURSE ANESTHETIST, CERTIFIED REGISTERED

## 2024-01-09 RX ORDER — ONDANSETRON 4 MG/1
4 TABLET, ORALLY DISINTEGRATING ORAL EVERY 30 MIN PRN
Status: CANCELLED | OUTPATIENT
Start: 2024-01-09

## 2024-01-09 RX ORDER — LIDOCAINE 40 MG/G
CREAM TOPICAL
Status: DISCONTINUED | OUTPATIENT
Start: 2024-01-09 | End: 2024-01-09 | Stop reason: HOSPADM

## 2024-01-09 RX ORDER — PROPOFOL 10 MG/ML
INJECTION, EMULSION INTRAVENOUS PRN
Status: DISCONTINUED | OUTPATIENT
Start: 2024-01-09 | End: 2024-01-09

## 2024-01-09 RX ORDER — PROPOFOL 10 MG/ML
INJECTION, EMULSION INTRAVENOUS CONTINUOUS PRN
Status: DISCONTINUED | OUTPATIENT
Start: 2024-01-09 | End: 2024-01-09

## 2024-01-09 RX ORDER — SODIUM CHLORIDE, SODIUM LACTATE, POTASSIUM CHLORIDE, CALCIUM CHLORIDE 600; 310; 30; 20 MG/100ML; MG/100ML; MG/100ML; MG/100ML
INJECTION, SOLUTION INTRAVENOUS CONTINUOUS
Status: DISCONTINUED | OUTPATIENT
Start: 2024-01-09 | End: 2024-01-09 | Stop reason: HOSPADM

## 2024-01-09 RX ORDER — ONDANSETRON 2 MG/ML
4 INJECTION INTRAMUSCULAR; INTRAVENOUS EVERY 30 MIN PRN
Status: CANCELLED | OUTPATIENT
Start: 2024-01-09

## 2024-01-09 RX ORDER — LIDOCAINE HYDROCHLORIDE 10 MG/ML
INJECTION, SOLUTION INFILTRATION; PERINEURAL PRN
Status: DISCONTINUED | OUTPATIENT
Start: 2024-01-09 | End: 2024-01-09

## 2024-01-09 RX ADMIN — PROPOFOL 50 MG: 10 INJECTION, EMULSION INTRAVENOUS at 13:16

## 2024-01-09 RX ADMIN — LIDOCAINE HYDROCHLORIDE 50 MG: 10 INJECTION, SOLUTION INFILTRATION; PERINEURAL at 13:15

## 2024-01-09 RX ADMIN — SODIUM CHLORIDE, POTASSIUM CHLORIDE, SODIUM LACTATE AND CALCIUM CHLORIDE: 600; 310; 30; 20 INJECTION, SOLUTION INTRAVENOUS at 12:35

## 2024-01-09 RX ADMIN — PROPOFOL 50 MG: 10 INJECTION, EMULSION INTRAVENOUS at 13:15

## 2024-01-09 RX ADMIN — PROPOFOL 200 MCG/KG/MIN: 10 INJECTION, EMULSION INTRAVENOUS at 13:16

## 2024-01-09 ASSESSMENT — LIFESTYLE VARIABLES: TOBACCO_USE: 1

## 2024-01-09 ASSESSMENT — ACTIVITIES OF DAILY LIVING (ADL): ADLS_ACUITY_SCORE: 35

## 2024-01-09 NOTE — ANESTHESIA PREPROCEDURE EVALUATION
"Anesthesia Pre-Procedure Evaluation    Patient: Estefania Arguello   MRN: [id number] : 1980        Procedure : Procedure(s):  Esophagoscopy, gastroscopy, duodenoscopy (EGD), combined  Colonoscopy          Past Medical History:   Diagnosis Date    Back pain     Hypothyroidism     Seasonal allergies       Past Surgical History:   Procedure Laterality Date    DILATION AND CURETTAGE SUCTION N/A 3/8/2022    Procedure: DILATION AND CURETTAGE, UTERUS, USING SUCTION;  Surgeon: Bairon Thomas MD;  Location: [facility]    EYE SURGERY      \"benign tumor above right eye\"    MOUTH SURGERY      \"benign tumor under molar removed\"      No Known Allergies   Social History     Tobacco Use    Smoking status: Former     Types: Cigarettes     Quit date: 2018     Years since quittin.5    Smokeless tobacco: Former   Substance Use Topics    Alcohol use: Not Currently      Wt Readings from Last 1 Encounters:   23 87.1 kg (192 lb)        Anesthesia Evaluation   Pt has had prior anesthetic.     No history of anesthetic complications       ROS/MED HX  ENT/Pulmonary:     (+)                tobacco use, Past use,                       Neurologic:  - neg neurologic ROS     Cardiovascular:  - neg cardiovascular ROS   (+)  - -   -  - -                                 No previous cardiac testing     METS/Exercise Tolerance:     Hematologic:  - neg hematologic  ROS     Musculoskeletal: Comment: CLBP      GI/Hepatic:  - neg GI/hepatic ROS  (-) GERD   Renal/Genitourinary:  - neg Renal ROS     Endo:     (+)          thyroid problem, hypothyroidism,    Obesity,       Psychiatric/Substance Use:  - neg psychiatric ROS     Infectious Disease:  - neg infectious disease ROS     Malignancy:  - neg malignancy ROS     Other:  - neg other ROS          Physical Exam    Airway  airway exam normal      Mallampati: II   TM distance: > 3 FB   Neck ROM: full   Mouth opening: > 3 cm    Respiratory Devices and Support         Dental  no notable dental " "history     (+) Completely normal teeth      Cardiovascular   cardiovascular exam normal       Rhythm and rate: regular and normal     Pulmonary   pulmonary exam normal        breath sounds clear to auscultation           OUTSIDE LABS:  CBC:   Lab Results   Component Value Date    WBC 5.6 03/04/2022    WBC 6.0 02/11/2022    HGB 14.1 03/08/2022    HGB 14.3 03/04/2022    HCT 42.2 03/04/2022    HCT 43.2 02/11/2022     03/04/2022     02/11/2022     BMP:   Lab Results   Component Value Date     04/02/2021     01/22/2021    POTASSIUM 4.1 04/02/2021    POTASSIUM 3.6 01/22/2021    CHLORIDE 105 04/02/2021    CHLORIDE 105 01/22/2021    CO2 26 04/02/2021    CO2 26 01/22/2021    BUN 13 04/02/2021    BUN 10 01/22/2021    CR 0.54 04/02/2021    CR 0.54 01/22/2021    GLC 93 04/02/2021     (H) 01/22/2021     COAGS: No results found for: \"PTT\", \"INR\", \"FIBR\"  POC:   Lab Results   Component Value Date    BGM 76 05/07/2021    HCG Positive (A) 06/18/2020     HEPATIC:   Lab Results   Component Value Date    ALBUMIN 2.5 (L) 04/02/2021    PROTTOTAL 6.6 (L) 04/02/2021    ALT 29 04/02/2021    AST 16 04/02/2021    ALKPHOS 71 04/02/2021    BILITOTAL 0.2 04/02/2021     OTHER:   Lab Results   Component Value Date    A1C 5.2 02/11/2022    LATHA 8.4 (L) 04/02/2021    TSH 1.04 11/02/2022    T4 1.07 02/11/2022    T4 13.0 02/11/2022       Anesthesia Plan    ASA Status:  2    NPO Status:  NPO Appropriate    Anesthesia Type: MAC.     - Reason for MAC: Deep or markedly invasive procedure (G8)   Induction: Intravenous.   Maintenance: TIVA.        Consents    Anesthesia Plan(s) and associated risks, benefits, and realistic alternatives discussed. Questions answered and patient/representative(s) expressed understanding.     - Discussed:     - Discussed with:  Patient      - Extended Intubation/Ventilatory Support Discussed: No.      - Patient is DNR/DNI Status: No     Use of blood products discussed: No .     Postoperative " Care    Pain management: Multi-modal analgesia.   PONV prophylaxis: Background Propofol Infusion     Comments:    Other Comments: The risks and benefits of anesthesia, and the alternatives where applicable, have been discussed with the patient, and they wish to proceed.             ADARSH Rutledge CRNA    I have reviewed the pertinent notes and labs in the chart from the past 30 days and (re)examined the patient.  Any updates or changes from those notes are reflected in this note.

## 2024-01-09 NOTE — ANESTHESIA CARE TRANSFER NOTE
Patient: Estefania Arguello    Procedure: Procedure(s):  ESOPHAGOGASTRODUODENOSCOPY, WITH BIOPSY  COLONOSCOPY, FLEXIBLE, WITH LESION REMOVAL USING SNARE       Diagnosis: Anal fissure [K60.2]  Rectal bleeding [K62.5]  Family history of gastric cancer [Z80.0]  Diagnosis Additional Information: No value filed.    Anesthesia Type:   MAC     Note:    Oropharynx: oropharynx clear of all foreign objects and spontaneously breathing  Level of Consciousness: drowsy  Oxygen Supplementation: room air    Independent Airway: airway patency satisfactory and stable  Dentition: dentition unchanged  Vital Signs Stable: post-procedure vital signs reviewed and stable  Report to RN Given: handoff report given  Patient transferred to: Phase II    Handoff Report: Identifed the Patient, Identified the Reponsible Provider, Reviewed the pertinent medical history, Discussed the surgical course, Reviewed Intra-OP anesthesia mangement and issues during anesthesia, Set expectations for post-procedure period and Allowed opportunity for questions and acknowledgement of understanding      Vitals:  Vitals Value Taken Time   /76 01/09/24 1350   Temp     Pulse 65 01/09/24 1350   Resp     SpO2 100 % 01/09/24 1355   Vitals shown include unfiled device data.    Electronically Signed By: ADARSH Rutledge CRNA  January 9, 2024  1:56 PM

## 2024-01-09 NOTE — ANESTHESIA POSTPROCEDURE EVALUATION
Patient: Estefania Arguello    Procedure: Procedure(s):  ESOPHAGOGASTRODUODENOSCOPY, WITH BIOPSY  COLONOSCOPY, FLEXIBLE, WITH LESION REMOVAL USING SNARE       Anesthesia Type:  MAC    Note:  Disposition: Outpatient   Postop Pain Control: Uneventful            Sign Out: Well controlled pain   PONV: No   Neuro/Psych: Uneventful            Sign Out: Acceptable/Baseline neuro status   Airway/Respiratory: Uneventful            Sign Out: Acceptable/Baseline resp. status   CV/Hemodynamics: Uneventful            Sign Out: Acceptable CV status   Other NRE: NONE   DID A NON-ROUTINE EVENT OCCUR? No    Event details/Postop Comments:  Pt was happy with anesthesia care.  No complications.  I will follow up with the pt if needed.           Last vitals:  Vitals Value Taken Time   /76 01/09/24 1350   Temp     Pulse 65 01/09/24 1350   Resp     SpO2 100 % 01/09/24 1355   Vitals shown include unfiled device data.    Electronically Signed By: ADARSH Rutledge CRNA  January 9, 2024  1:56 PM

## 2024-01-09 NOTE — DISCHARGE INSTRUCTIONS
[facility]    Home Care Following Endoscopy          Activity:  You have just undergone an endoscopic procedure usually performed with conscious sedation.  Do not work or operate machinery (including a car) for at least 12 hours.    I encourage you to walk and attempt to pass this air as soon as possible.    Diet:  Return to the diet you were on before your procedure but eat lightly for the first 12-24 hours.  Drink plenty of water.  Resume any regular medications unless otherwise advised by your physician.  Please begin any new medication prescribed as a result of your procedure as directed by your physician.   If you had any biopsy or polyp removed please refrain from aspirin or aspirin products for 2 days.  If on Coumadin please restart as instructed by your physician.   Pain:  You may take Tylenol as needed for pain.  Expected Recovery:  You can expect some mild abdominal fullness and/or discomfort due to the air used to inflate your intestinal tract. It is also normal to have a mild sore throat after upper endoscopy.    Call Your Physician if You Have:  After Upper Endoscopy:  Shoulder, back or chest pain.  Difficulty breathing or swallowing.  Vomiting blood.  After Colonoscopy:  Worsening persisting abdominal pain which is worse with activity.  Fevers (>101 degrees F), chills or shakes.  Passage of continued blood with bowel movements.     Any questions or concerns about your recovery, please call [phone] or after hours [phone] includes the [facility] ([phone]) Nurse Advice Line.    Follow-up Care:  You did have polyps/biopsy tissue sample(s) removed.  The polyps/biopsy tissue sample(s) will be sent to pathology.    You should receive letter in your My Chart from Dr Soto with your results within 1-2 weeks. If you do not participate in My Chart a physical letter will come in the mail in 2-3 weeks.  Please call if you have not received a notification of your results.  If asked to return to clinic  please make an appointment 1 week after your procedure.  Call [phone].

## 2024-02-01 ENCOUNTER — TELEPHONE (OUTPATIENT)
Dept: FAMILY MEDICINE | Facility: CLINIC | Age: 44
End: 2024-02-01
Payer: COMMERCIAL

## 2024-02-01 DIAGNOSIS — E03.9 HYPOTHYROIDISM, UNSPECIFIED TYPE: ICD-10-CM

## 2024-02-02 ENCOUNTER — NURSE TRIAGE (OUTPATIENT)
Dept: FAMILY MEDICINE | Facility: CLINIC | Age: 44
End: 2024-02-02
Payer: COMMERCIAL

## 2024-02-02 RX ORDER — LEVOTHYROXINE SODIUM 100 UG/1
100 TABLET ORAL DAILY
Qty: 90 TABLET | Refills: 0 | OUTPATIENT
Start: 2024-02-02

## 2024-02-02 NOTE — TELEPHONE ENCOUNTER
Patient has had on and off ringing in her right ear for a couple of months and this has been getting worse and more frequent.  She has not had any hearing loss.    Per protocol patient advised to be seen. Patient agrees with the plan.  .Ana Mcclellan RN on 2/2/2024 at 3:38 PM    Reason for Disposition   Symptoms only or mainly in 1 ear (unilateral tinnitus)    Additional Information   Negative: Followed an ear injury   Negative: Hearing loss is main symptom   Negative: Patient sounds very sick or weak to the triager   Negative: Taking aspirin and dosage sounds high (i.e., > 1500 mg/day)   Negative: Hearing loss in one or both ears and sudden onset and present now   Negative: Ear is painful   Negative: Decreased hearing followed sudden, extremely loud noise (e.g., explosion, not just loud concert)   Negative: Taking medication that can damage hearing (i.e., gentamycin, tobramycin, furosemide, ethacrynic acid, cisplatin, quinidine)   Negative: Painful rash with multiple small blisters grouped together (i.e., dermatomal distribution or 'band' or 'stripe' on ear or one side of face)   Negative: MODERATE-SEVERE tinnitus (i.e., interferes with work, school, or sleep)    Protocols used: Tinnitus-A-OH

## 2024-02-02 NOTE — TELEPHONE ENCOUNTER
Patient has an appointment scheduled for April, can she get her labs done and refill to hold her.    She currently has 2 pills left of her synthroid.    Ana Mcclellan RN on 2/2/2024 at 3:32 PM

## 2024-02-02 NOTE — TELEPHONE ENCOUNTER
RN Triage    Patient Contact    Attempt # 1    Was call answered?  No.  Left message on voicemail with information to call me back.    Ana Mcclellan RN on 2/2/2024 at 3:04 PM

## 2024-02-03 RX ORDER — LEVOTHYROXINE SODIUM 100 UG/1
100 TABLET ORAL DAILY
Qty: 30 TABLET | Refills: 0 | Status: SHIPPED | OUTPATIENT
Start: 2024-02-03 | End: 2024-02-06

## 2024-02-05 NOTE — TELEPHONE ENCOUNTER
RN TRIAGE CALL:    Patient Contact    Attempt # 1    Was call answered?  No.  Left message on voicemail with information to call me back.    Upon callback, would like to relay the following message from covering provider:      Becki Rodriguez PA-C  Dyad 1 Triage2 days ago     TSH lab ordered, please make lab appointment. 1 month refill sent. Labs needed prior to getting additional refills.       JS AlvaradoN, RN

## 2024-02-06 ENCOUNTER — OFFICE VISIT (OUTPATIENT)
Dept: FAMILY MEDICINE | Facility: CLINIC | Age: 44
End: 2024-02-06
Payer: COMMERCIAL

## 2024-02-06 VITALS
HEART RATE: 88 BPM | OXYGEN SATURATION: 99 % | TEMPERATURE: 97.8 F | WEIGHT: 202 LBS | HEIGHT: 66 IN | DIASTOLIC BLOOD PRESSURE: 84 MMHG | BODY MASS INDEX: 32.47 KG/M2 | RESPIRATION RATE: 14 BRPM | SYSTOLIC BLOOD PRESSURE: 128 MMHG

## 2024-02-06 DIAGNOSIS — M25.521 RIGHT ELBOW PAIN: ICD-10-CM

## 2024-02-06 DIAGNOSIS — Z30.41 ORAL CONTRACEPTIVE PILL SURVEILLANCE: ICD-10-CM

## 2024-02-06 DIAGNOSIS — K29.00 ACUTE SUPERFICIAL GASTRITIS WITHOUT HEMORRHAGE: ICD-10-CM

## 2024-02-06 DIAGNOSIS — E66.811 CLASS 1 OBESITY WITHOUT SERIOUS COMORBIDITY WITH BODY MASS INDEX (BMI) OF 32.0 TO 32.9 IN ADULT, UNSPECIFIED OBESITY TYPE: ICD-10-CM

## 2024-02-06 DIAGNOSIS — E66.811 CLASS 1 OBESITY DUE TO EXCESS CALORIES WITHOUT SERIOUS COMORBIDITY WITH BODY MASS INDEX (BMI) OF 31.0 TO 31.9 IN ADULT: ICD-10-CM

## 2024-02-06 DIAGNOSIS — K90.0 CELIAC DISEASE: ICD-10-CM

## 2024-02-06 DIAGNOSIS — K29.80 DUODENITIS DETERMINED BY BIOPSY: Primary | ICD-10-CM

## 2024-02-06 DIAGNOSIS — E03.9 HYPOTHYROIDISM, UNSPECIFIED TYPE: Primary | ICD-10-CM

## 2024-02-06 DIAGNOSIS — H93.11 TINNITUS, RIGHT: ICD-10-CM

## 2024-02-06 DIAGNOSIS — Z86.32 HISTORY OF GESTATIONAL DIABETES MELLITUS (GDM): ICD-10-CM

## 2024-02-06 DIAGNOSIS — E66.09 CLASS 1 OBESITY DUE TO EXCESS CALORIES WITHOUT SERIOUS COMORBIDITY WITH BODY MASS INDEX (BMI) OF 31.0 TO 31.9 IN ADULT: ICD-10-CM

## 2024-02-06 LAB — TSH SERPL DL<=0.005 MIU/L-ACNC: 0.91 UIU/ML (ref 0.3–4.2)

## 2024-02-06 PROCEDURE — 99215 OFFICE O/P EST HI 40 MIN: CPT

## 2024-02-06 PROCEDURE — 36415 COLL VENOUS BLD VENIPUNCTURE: CPT

## 2024-02-06 PROCEDURE — 86364 TISS TRNSGLTMNASE EA IG CLAS: CPT

## 2024-02-06 PROCEDURE — 84443 ASSAY THYROID STIM HORMONE: CPT

## 2024-02-06 RX ORDER — FAMOTIDINE 20 MG/1
20 TABLET, FILM COATED ORAL 2 TIMES DAILY
Qty: 180 TABLET | Refills: 0 | Status: SHIPPED | OUTPATIENT
Start: 2024-02-06 | End: 2024-03-04

## 2024-02-06 RX ORDER — NIFEDIPINE
POWDER (GRAM) MISCELLANEOUS
COMMUNITY
Start: 2024-01-18 | End: 2024-02-19

## 2024-02-06 RX ORDER — LEVOTHYROXINE SODIUM 100 UG/1
100 TABLET ORAL DAILY
Qty: 90 TABLET | Refills: 3 | Status: SHIPPED | OUTPATIENT
Start: 2024-02-06

## 2024-02-06 RX ORDER — OMEPRAZOLE 40 MG/1
40 CAPSULE, DELAYED RELEASE ORAL DAILY
Qty: 90 CAPSULE | Refills: 3 | Status: SHIPPED | OUTPATIENT
Start: 2024-02-06 | End: 2024-04-06

## 2024-02-06 NOTE — PATIENT INSTRUCTIONS
Hypothyroidism:  Recommend checking thyroid labs today.  If labs are stable and within normal range, I recommend continuing levothyroxine at the current dose.  If your labs are abnormal, we may consider adjusting the levothyroxine dose  Make sure to take levothyroxine first thing in the morning on empty stomach, wait 1 hour prior to eating anything or taking other medications, and avoid calcium containing products for at least 4 hours after taking levothyroxine.    All oral products:  Take on an empty stomach at least 30 to 60 minutes before breakfast.  Do not take iron products, antacids that have aluminum or magnesium, or calcium carbonate, within 4 hours before or 4 hours after taking this drug.  If you take other drugs, they may need to be taken at a different time than this drug. Check with your doctor or pharmacist about the best time to take them.  Some foods and drinks may affect this drug in your body. These include milk, other dairy products, soybean flour (infant formula), walnuts, grapefruit, and foods with fiber. Talk with your doctor to see if you need to avoid these foods and drinks at the same time you take this drug.  If you drink grapefruit juice or eat grapefruit often, talk with your doctor.  Keep taking this drug as you have been told by your doctor or other health care provider, even if you feel well.  There are different brands and forms of this drug. Do not switch between different brands or forms of this drug without calling the doctor who ordered it.       Ear ringing (tinnitus):  Evaluation today  Tinnitus questionnaire today  I recommend follow-up with ENT for further evaluation and management  Monitor for changes in hearing.  If you develop hearing changes notify the clinic immediately  You may try taking Zyrtec, over-the-counter decongestions, and Flonase nasal spray to see if this helps with the symptoms  Follow-up with any new, worsening, or persistent symptoms  Stop taking Excedrin,  Take Tylenol and Aleve OTC as needed for pain      Superficial gastritis/concern for celiac disease:  Recommend blood testing for celiac disease, orders were placed today  Discussed EGD results and superficial gastritis.  Discussed biopsy results.  It was recommended that she start a proton pump inhibitor medication.  I recommend starting omeprazole 40 mg, take this medication first thing in the morning on empty stomach and wait 1 hour prior to eating or taking any other medications.  Take famotidine (Pepcid) 20 mg twice a day  Schedule follow-up appointment  Stop taking Excedrin and I recommend avoiding NSAID medication if possible.  You may take Tylenol.  You may try over-the-counter Aleve as needed, however I recommend trying to avoid NSAIDs if possible  For celiac disease, recommendations are a gluten-free diet  I recommend follow-up with your primary care provider next available provider    Obesity:  She also has questions regarding weight loss medications.  She was prescribed Wegovy in the past and she was not able to fill it because was out of stock.  We discussed a low supply and patient is asking about additional options.  The appointment went over in time today and I recommended scheduling a follow-up appointment to further discuss weight loss medications and develop a treatment plan.  We discussed potential other weight loss medications.  I recommend making changes with diet and exercise, see additional information below.  Contact the clinic if you would like referral to nutrition or weight management  Schedule follow-up appointment to further discuss weight management    Elbow pain:  Discussed concerns for tennis elbow.  Use Tylenol as needed for pain  Avoid taking ibuprofen  May do a trial of topical over-the-counter Voltaren gel 4 times daily  Recommend heat and stretching, see attachment with exercises  Symptoms worsen or fail to improve, recommend follow-up appointment  Contact clinic if you would  like referral to physical therapy      Tinnitus: Care Instructions  Overview     Many people have some ringing sounds in their ears once in a while. You may hear a roar, a hiss, a tinkle, or a buzz. The sound usually lasts only a few minutes. Ringing in the ears that doesn't get better or go away is called tinnitus.  Tinnitus is usually caused by long-term exposure to loud noise. This damages the nerves in the inner ear. It can occur with all types of hearing loss. It may be a symptom of almost any ear problem.  Tinnitus may be caused by a buildup of earwax. Or it may be caused by ear infections or certain medicines (especially antibiotics or large amounts of aspirin). You can also hear noises in your ears because of an injury to the ears or a medical condition.  You may need tests to evaluate your hearing and to find causes of long-lasting tinnitus.  Your doctor may suggest one or more treatments to help you cope with it. You can also do things at home to help reduce symptoms.  Follow-up care is a key part of your treatment and safety. Be sure to make and go to all appointments, and call your doctor if you are having problems. It's also a good idea to know your test results and keep a list of the medicines you take.  How can you care for yourself at home?  Do not smoke or use other tobacco products. Nicotine reduces blood flow to the ear and makes tinnitus worse. If you need help quitting, talk to your doctor about stop-smoking programs and medicines. These can increase your chances of quitting for good.  Talk to your doctor about whether to stop taking aspirin and similar products such as ibuprofen or naproxen.  Get exercise often. It can improve blood flow to the ear.  Ways to cope with noise  Some tinnitus may last a long time. To cope with noise, try to:  Avoid noises that you think caused your tinnitus. If you can't avoid loud noises, wear earplugs or earmuffs.  Ignore the sound by paying attention to other  things.  Relax using biofeedback, meditation, or yoga. Feeling stressed and being tired can make tinnitus worse.  Play music or white noise to help you sleep. Background noise may cover up the noise that you hear in your ears. You can buy a machine that makes soothing sounds, such as ocean waves.  When should you call for help?   Call 911 anytime you think you may need emergency care. For example, call if:    You have symptoms of a stroke. These may include:  Sudden numbness, tingling, weakness, or loss of movement in your face, arm, or leg, especially on only one side of your body.  Sudden vision changes.  Sudden trouble speaking.  Sudden confusion or trouble understanding simple statements.  Sudden problems with walking or balance.  A sudden, severe headache that is different from past headaches.   Call your doctor now or seek immediate medical care if:    You develop other symptoms. These may include hearing loss (or worse hearing loss), balance problems, dizziness, nausea, or vomiting.   Watch closely for changes in your health, and be sure to contact your doctor if:    Your tinnitus moves from both ears to one ear.     Your hearing loss gets worse within 1 day after an ear injury.     Your tinnitus or hearing loss does not get better within 1 week after an ear injury.     Your tinnitus bothers you enough that you want to take medicines to help you cope with it.     Tinnitus Evaluation Appointment  Tinnitus (tin-ni-tus) is a ringing, buzzing, or other sound in your head. About 50 million people in the [place] have tinnitus at some point in their life.   The tinnitus evaluation appointment offers testing, teaching, and counseling for tinnitus. Our goal is to help you better understand your tinnitus and how to manage its effects.  Who is this evaluation for?  The evaluation appointment is for people whose tinnitus bothers them and lasts 3 to 6 months or longer.  Will my insurance pay for this evaluation?  Check with  "your insurance company to see if your plan will cover this appointment. The charge codes for the Tinnitus Evaluation are 89139 and 44705. (Your insurance company may ask you for these.)  How does it work?  We will set up a meeting for you with a specialist who treats hearing problems, called an audiologist ([id number]).   Before this meeting you need:  The results of a recent hearing test  An Ear, Nose and Throat doctor (ENT) to say it is okay (called a \"medical clearance\").  To fill out these 2 forms:  Tinnitus History Questionnaire  Tinnitus Handicap Inventory (THI)  What happens at the evaluation?  This will be a 90-minute appointment. Please feel free to bring a family member or friend.   The audiologist will:  Do more tinnitus testing, if needed.  Review your tinnitus history and questionnaire.  Talk with you about testing and treatment options.  Answer your questions.  Managing your tinnitus won't happen overnight. But we will strive to give you training and skills to help you cope. That way, you can focus on the important things in your life.  How do I get started?  Once you had your hearing test and have seen ENT, please call to set up your first meeting with an audiologist.  [facility] [address], [address], [place]  [phone]    Hypothyroidism: Care Instructions  Your Care Instructions     When you have hypothyroidism, your body doesn't make enough thyroid hormone. This hormone helps your body use energy. If your thyroid level is low, you may feel tired, be constipated, have an increase in your blood pressure, or have dry skin or memory problems. You may also get cold easily, even when it is warm. Women with low thyroid levels may have heavy menstrual periods.  A blood test to find your thyroid-stimulating hormone (TSH) level is used to check for hypothyroidism. A high TSH level may mean that you have it.  The treatment for hypothyroidism is " thyroid hormone pills. You should start to feel better in 1 to 2 weeks. Most people need treatment for the rest of their lives. You will need regular visits with your doctor to make sure you are doing well and that you have the right dose of medicine.  Follow-up care is a key part of your treatment and safety. Be sure to make and go to all appointments, and call your doctor if you are having problems. It's also a good idea to know your test results and keep a list of the medicines you take.  How can you care for yourself at home?  Take your thyroid hormone medicine exactly as prescribed. Call your doctor if you think you are having a problem with your medicine. Most people do not have side effects if they take the right amount of medicine regularly.  Take the medicine 30 minutes before breakfast, and do not take it with calcium, vitamins, or iron.  Do not take extra doses of your thyroid medicine. It will not help you get better any faster, and it may cause side effects.  If you forget to take a dose, do NOT take a double dose of medicine. Take your usual dose the next day.  Tell your doctor about all prescription, herbal, or over-the-counter products you take.  Take care of yourself. Eat a healthy diet, get enough sleep, and get regular exercise.  When should you call for help?   Call 911 anytime you think you may need emergency care. For example, call if:    You passed out (lost consciousness).     You have severe trouble breathing.     You have a very slow heartbeat (less than 60 beats a minute).     You have a low body temperature (95 F or below).   Call your doctor now or seek immediate medical care if:    You feel tired, sluggish, or weak.     You have trouble remembering things or concentrating.     You do not begin to feel better 2 weeks after starting your medicine.   Watch closely for changes in your health, and be sure to contact your doctor if you have any problems.      What is tinnitus?  Tinnitus is  "the term doctors use for when a person hears ringing, buzzing, hissing, or roaring in 1 or both ears. Many people have this problem. In some people, it can last for months or years. Tinnitus can be annoying. But it is not usually a sign of a serious problem.  What causes tinnitus?  Tinnitus is often caused by damage to cells in a part of the inner ear (figure 1). When these cells are damaged, they send signals to the brain that make you think you are hearing things that are not really there.  The damage that leads to tinnitus can be caused by:  ?Normal aging and hearing loss  ?Loud noise  ?Medicines, including some antibiotics, anti-seizure medicines, and painkillers  ?Head or neck injuries  ?Certain diseases  What are the symptoms of tinnitus?        Most people with tinnitus hear a high-pitched, steady ringing. But for some people, it might sound like:  ?Pulsing  ?Rushing  ?Humming  ?Buzzing  ?Roaring  These sounds sometimes get louder or softer during movement or exercise.  Should I see a doctor or nurse?  Yes. If you hear ringing or other sounds that the people around you do not hear, see your doctor or nurse. They can do tests to make sure that there is nothing seriously wrong with your ears.  Is there a treatment for tinnitus?  There is no cure for tinnitus. But there are treatments that can make it easier to live with. These include:  ?Hearing aids - People with tinnitus caused by hearing loss often find that their symptoms are better if they get a hearing aid. Hearing aids make outside sounds clearer and louder, which helps make tinnitus less noticeable.  ?Tinnitus retraining therapy - This involves working with a tinnitus expert to \"retrain\" your brain. You learn to think of the ringing in your ears as normal background sounds. Over time, the sounds bother you less.  ?Biofeedback - This involves learning to breathe deeply, relax, and change your reaction to your tinnitus. Doing this helps you to be less " "bothered by the sounds.  ?Cognitive behavioral therapy - This involves talking with a psychologist or counselor. You learn ways to distract yourself and cope with tinnitus.  ?Masking - This means covering up tinnitus by listening to music or other soft sounds. You can even get a special masking device for tinnitus. This looks like a hearing aid and makes \"white noise.\"  ?Procedure to treat blood vessels near the ear - This is only done for rare cases where tinnitus is caused by a more serious problem with the blood vessels near the ear.  What will my life be like?  If you have had tinnitus for a long time, it probably will not go away. But it might bother you less over time.  Try to stay positive about your condition. If you are having a hard time coping, or if you feel anxious or depressed, talk to your doctor or nurse. They can help.  If your tinnitus makes it hard to sleep, talk to your doctor or nurse about that, too. Not getting enough sleep can make tinnitus even harder to cope with. But there might be treatments or other things that can help.        TINNITUS OVERVIEW  Tinnitus is the perception of a ringing, buzzing, hissing, or roaring sound in one or both ears. It may be continuous or intermittent (occasional), can be pulsing or steady, and can range in severity from a soft buzz to a loud ring.  It is estimated that as many as 50 million people in the [place] suffer from chronic tinnitus (lasting longer than six months) [1]; about a quarter of those have tinnitus that is severe enough to interfere with daily activities. Tinnitus is more common in men than women, and it becomes more common with aging [2,3].  Although tinnitus can be annoying, it is not usually a sign of a serious problem. There are ways to mask and adapt to the symptoms to minimize the impact of tinnitus on daily life.  TINNITUS CAUSES  Tinnitus is often caused by damage to the tiny hairs on auditory cells within the inner ear (figure " 1). When the cells are damaged or stressed, they change the signal they send to the brain. Sometimes this produces a noise heard only by the patient. Damage to the hair cells can be a result of normal aging, or it can occur after exposure to very loud noise, certain medications, injury, or disease. Sometimes the damage is temporary but the noise is permanent.  Occasionally tinnitus can be a result of problems not related to the hearing system. Disorders of the jaw joint (called the temporomandibular joint), severe anxiety, and neck injuries can cause tinnitus.  Age-related hearing loss -- Normal hearing loss often occurs with advancing age and is frequently accompanied by tinnitus.  Noise-induced hearing loss -- Prolonged exposure to excessively loud noise (eg, from chainsaws, gunfire, or even loud music) may lead to temporary or permanent hearing loss and tinnitus. A short blast of loud noise also can cause severe to profound hearing loss, pain, or hyperacusis (abnormal sensitivity to noise).  Other causes of auditory system dysfunction -- There are many auditory problems that can lead to hearing loss and tinnitus. These include use of certain medications, stiffening of the bones in the middle ear (otosclerosis), tumors within the auditory system, blood vessels or neurologic disorders, and genetic or inherited inner ear disorders.  TINNITUS SYMPTOMS  A high-pitched steady tone (ringing) is the most common form of tinnitus. Other symptoms can include a pulsation that is rushing or humming and varies in intensity with exercise or changing of body position. A clicking sound may indicates a nerve or muscle abnormality.  TINNITUS DIAGNOSIS  It is important for anyone with tinnitus to be evaluated to ensure that there is not another, more serious problem. In addition, anyone with severe or worsening and continuous ringing, buzzing, or other noises in the ear should be evaluated. Other specialized tests may be  "recommended.  Hearing tests can help provide more information about the potential cause of tinnitus. Other tests, such as brain imaging with magnetic resonance imaging (MRI) or computed tomography (CT) scans may be needed, depending upon the results of the medical history and physical examination.  TINNITUS MANAGEMENT  The management of tinnitus involves treating any underlying disorders or abnormalities (see 'Tinnitus causes' above) as well as addressing the tinnitus itself. Although there is no cure for most cases of chronic tinnitus, there are ways to manage the condition.  Treatment of underlying problems  Hearing loss -- Hearing aids may improve tinnitus symptoms in people with age-related hearing loss. Hearing aids work by making outside sounds clearer and louder, which may cause the tinnitus noise to be less noticeable. People with other causes of hearing loss may benefit from surgery to the outer or middle ear.  Cochlear implants are devices that are implanted in the inner ear and use electrical stimulation to help improve hearing. They may be recommended for adults or children with severe to profound hearing loss who do not benefit from hearing aids. These implants may help relieve tinnitus in some cases; however, they are only available in selected cases and a full evaluation is required before consideration.  In patients with auditory system damage due to use of medication, stopping the medication may improve tinnitus and prevent hearing loss from worsening. Patients should consult with a health care provider before starting or stopping any medications.  Depression -- Depression is common in people with tinnitus. Safe and effective treatments for depression are available and antidepressant medications may improve the symptoms of tinnitus in some people. Antidepressants can also improve a person's ability to cope with the problem. (See \"Patient education: Depression in adults (Beyond the Basics)\" and " "\"Patient education: Depression treatment options for adults (Beyond the Basics)\".)  Insomnia -- People who have difficulty sleeping as a result of tinnitus may be treated with medications and/or behavior changes to improve sleep. (See \"Patient education: Insomnia treatments (Beyond the Basics)\".)  Behavioral therapies -- A number of behavioral therapies can help a person to live with chronic tinnitus.  Tinnitus retraining therapy -- Tinnitus retraining therapy (TRT) involves retraining the subconscious part of the auditory system to accept the sounds associated with tinnitus as normal, natural sounds rather than annoying sounds [4]. The goal is for the person to become unaware of their tinnitus unless they consciously choose to focus on it.  TRT is performed by experts in a tinnitus center; it includes counseling as well as the use of a wearable device that emits low-level noise and environmental sounds. Although TRT has demonstrated short-term success in many tinnitus sufferers, it requires a commitment to the program; the noise-generating device may need to be worn for one to two years. Studies show up to 80 percent of patients find some relief of their tinnitus with TRT.  Masking -- Masking devices resemble hearing aids and produce low-level sounds, which help to reduce or eliminate the tinnitus noise in some patients [5]. Masking does not relieve tinnitus in all patients. Some people report a worsening of their tinnitus with masking. Those who do experience relief typically find that tinnitus returns after the masking device is removed.  Patients can also achieve a lesser degree of masking by listening to quiet music or creating low background noise with a radio on low volume, a fan, a white noise machine, or pillow speakers. This may be especially helpful for people with tinnitus that is bothersome in quiet environments.  Biofeedback and stress reduction -- Biofeedback is a relaxation technique that teaches a " person to control certain body functions, such as heart and breathing rate. Biofeedback may help people to manage tinnitus-related distress by changing their reaction to it. Some people experience relief of tinnitus symptoms once they are able to stop thinking of their tinnitus as bothersome or stressful [6].  Cognitive behavioral therapy -- The goal of cognitive behavioral therapy (CBT) is to teach patients to manage their psychological responses to tinnitus. It involves using coping strategies, distraction skills, and relaxation techniques.  Other therapies -- A number of other types of treatments for tinnitus have been studied, although none have been found to reliably be more effective than placebo. Nevertheless, many tinnitus support groups have members who are helped by these treatments. Individual patients who respond may be experiencing a true benefit.  ?Electrical stimulation - Electrical stimulation of certain parts of the inner ear can be accomplished by placing electrodes on the skin or using acupuncture needles (see below). There are different modes of delivering electrical stimulation, depending on the patient's situation and other factors such as whether hearing loss is present.  ?Acupuncture - Acupuncture involves inserting hair-thin metal needles into the skin at specific points on the body. It causes little to no pain. Acupuncture may be used alone or in combination with electric stimulation.  ?Repetitive transcranial magnetic stimulation - During repetitive transcranial magnetic stimulation (rTMS), a powerful magnetic field is used to stimulate the brain. This treatment shows promise for some tinnitus sufferers but is still experimental.  ?Herbal remedies - Combinations of herbs (also called botanicals) are often promoted as treatments for medical problems. Herbal medicines may come in the form of a powder, liquid, or pill. Ginkgo biloba and melatonin have both been studied for use in treating  tinnitus. While some patients report benefit from these types of treatment, there are no studies with high rates of improvement in tinnitus from herbal remedies.  ?Vitamins and minerals - Vitamins and minerals that have been studied for treatment of tinnitus and inner-ear health include niacin (a B vitamin), zinc, and copper. While some patients report benefit from these types of treatment, there are no studies with high rates of improvement in tinnitus from vitamin or mineral remedies.  PROGNOSIS  The impact of tinnitus on everyday life varies, often depending upon the severity of the tinnitus noise. About 25 percent of sufferers report an increase in tinnitus severity over time [7]. Long-term tinnitus is unlikely to go away completely. However, it often becomes less bothersome over time, especially when hearing loss is also present.        Patient understood and verbally consented to the treatment plan. Discussed symptoms that would warrant an urgent or emergent visit. All of the patients' questions were answered. Patient was instructed to contact the clinic if questions or concerns arise. Recommend follow up appointments if symptoms worsen or fail to improve. Recommend follow up as needed. Recommend ER in the case of an emergency.    Becki Rodriguez PA-C    Please note: Voice recognition software may have been used in preparing this note, unintended word substitutions may be present.

## 2024-02-06 NOTE — RESULT ENCOUNTER NOTE
Hello -    Here are my comments about the recent results.  Your TSH is within normal range.  Medication refills were sent to your pharmacy.    Please let us know if you have any questions or concerns.    Regards,  Becki Rodriguez PA-C

## 2024-02-06 NOTE — PROGRESS NOTES
"  Assessment & Plan     Hypothyroidism, unspecified type  - TSH with free T4 reflex  - levothyroxine (SYNTHROID/LEVOTHROID) 100 MCG tablet; Take 1 tablet (100 mcg) by mouth daily    Tinnitus, right    Acute superficial gastritis without hemorrhage  - famotidine (PEPCID) 20 MG tablet; Take 1 tablet (20 mg) by mouth 2 times daily  - Tissue transglutaminase yomi IgA and IgG; Future  - Tissue transglutaminase yomi IgA and IgG    Celiac disease    Right elbow pain    Class 1 obesity without serious comorbidity with body mass index (BMI) of 32.0 to 32.9 in adult, unspecified obesity type    Ordering of each unique test  Prescription drug management  I spent a total of 46 minutes on the day of the visit.   Time spent by me doing chart review, history and exam, documentation and further activities per the note      BMI  Estimated body mass index is 32.35 kg/m  as calculated from the following:    Height as of this encounter: 1.683 m (5' 6.26\").    Weight as of this encounter: 91.6 kg (202 lb).   Weight management plan: Discussed healthy diet and exercise guidelines scheduled for appointment to discuss weight management and medication options      See Patient Instructions  Patient Instructions   Hypothyroidism:  Recommend checking thyroid labs today.  If labs are stable and within normal range, I recommend continuing levothyroxine at the current dose.  If your labs are abnormal, we may consider adjusting the levothyroxine dose  Make sure to take levothyroxine first thing in the morning on empty stomach, wait 1 hour prior to eating anything or taking other medications, and avoid calcium containing products for at least 4 hours after taking levothyroxine.    All oral products:  Take on an empty stomach at least 30 to 60 minutes before breakfast.  Do not take iron products, antacids that have aluminum or magnesium, or calcium carbonate, within 4 hours before or 4 hours after taking this drug.  If you take other drugs, they may " """Stable no progression.  Continue to monitor.""" need to be taken at a different time than this drug. Check with your doctor or pharmacist about the best time to take them.  Some foods and drinks may affect this drug in your body. These include milk, other dairy products, soybean flour (infant formula), walnuts, grapefruit, and foods with fiber. Talk with your doctor to see if you need to avoid these foods and drinks at the same time you take this drug.  If you drink grapefruit juice or eat grapefruit often, talk with your doctor.  Keep taking this drug as you have been told by your doctor or other health care provider, even if you feel well.  There are different brands and forms of this drug. Do not switch between different brands or forms of this drug without calling the doctor who ordered it.       Ear ringing (tinnitus):  Evaluation today  Tinnitus questionnaire today  I recommend follow-up with ENT for further evaluation and management  Monitor for changes in hearing.  If you develop hearing changes notify the clinic immediately  You may try taking Zyrtec, over-the-counter decongestions, and Flonase nasal spray to see if this helps with the symptoms  Follow-up with any new, worsening, or persistent symptoms  Stop taking Excedrin, Take Tylenol and Aleve OTC as needed for pain      Superficial gastritis/concern for celiac disease:  Recommend blood testing for celiac disease, orders were placed today  Discussed EGD results and superficial gastritis.  Discussed biopsy results.  It was recommended that she start a proton pump inhibitor medication.  I recommend starting omeprazole 40 mg, take this medication first thing in the morning on empty stomach and wait 1 hour prior to eating or taking any other medications.  Take famotidine (Pepcid) 20 mg twice a day  Schedule follow-up appointment  Stop taking Excedrin and I recommend avoiding NSAID medication if possible.  You may take Tylenol.  You may try over-the-counter Aleve as needed, however I recommend trying to  avoid NSAIDs if possible  For celiac disease, recommendations are a gluten-free diet  I recommend follow-up with your primary care provider next available provider    Obesity:  She also has questions regarding weight loss medications.  She was prescribed Wegovy in the past and she was not able to fill it because was out of stock.  We discussed a low supply and patient is asking about additional options.  The appointment went over in time today and I recommended scheduling a follow-up appointment to further discuss weight loss medications and develop a treatment plan.  We discussed potential other weight loss medications.  I recommend making changes with diet and exercise, see additional information below.  Contact the clinic if you would like referral to nutrition or weight management  Schedule follow-up appointment to further discuss weight management    Elbow pain:  Discussed concerns for tennis elbow.  Use Tylenol as needed for pain  Avoid taking ibuprofen  May do a trial of topical over-the-counter Voltaren gel 4 times daily  Recommend heat and stretching, see attachment with exercises  Symptoms worsen or fail to improve, recommend follow-up appointment  Contact clinic if you would like referral to physical therapy      Tinnitus: Care Instructions  Overview     Many people have some ringing sounds in their ears once in a while. You may hear a roar, a hiss, a tinkle, or a buzz. The sound usually lasts only a few minutes. Ringing in the ears that doesn't get better or go away is called tinnitus.  Tinnitus is usually caused by long-term exposure to loud noise. This damages the nerves in the inner ear. It can occur with all types of hearing loss. It may be a symptom of almost any ear problem.  Tinnitus may be caused by a buildup of earwax. Or it may be caused by ear infections or certain medicines (especially antibiotics or large amounts of aspirin). You can also hear noises in your ears because of an injury to the  ears or a medical condition.  You may need tests to evaluate your hearing and to find causes of long-lasting tinnitus.  Your doctor may suggest one or more treatments to help you cope with it. You can also do things at home to help reduce symptoms.  Follow-up care is a key part of your treatment and safety. Be sure to make and go to all appointments, and call your doctor if you are having problems. It's also a good idea to know your test results and keep a list of the medicines you take.  How can you care for yourself at home?  Do not smoke or use other tobacco products. Nicotine reduces blood flow to the ear and makes tinnitus worse. If you need help quitting, talk to your doctor about stop-smoking programs and medicines. These can increase your chances of quitting for good.  Talk to your doctor about whether to stop taking aspirin and similar products such as ibuprofen or naproxen.  Get exercise often. It can improve blood flow to the ear.  Ways to cope with noise  Some tinnitus may last a long time. To cope with noise, try to:  Avoid noises that you think caused your tinnitus. If you can't avoid loud noises, wear earplugs or earmuffs.  Ignore the sound by paying attention to other things.  Relax using biofeedback, meditation, or yoga. Feeling stressed and being tired can make tinnitus worse.  Play music or white noise to help you sleep. Background noise may cover up the noise that you hear in your ears. You can buy a machine that makes soothing sounds, such as ocean waves.  When should you call for help?   Call 911 anytime you think you may need emergency care. For example, call if:    You have symptoms of a stroke. These may include:  Sudden numbness, tingling, weakness, or loss of movement in your face, arm, or leg, especially on only one side of your body.  Sudden vision changes.  Sudden trouble speaking.  Sudden confusion or trouble understanding simple statements.  Sudden problems with walking or balance.  A  "sudden, severe headache that is different from past headaches.   Call your doctor now or seek immediate medical care if:    You develop other symptoms. These may include hearing loss (or worse hearing loss), balance problems, dizziness, nausea, or vomiting.   Watch closely for changes in your health, and be sure to contact your doctor if:    Your tinnitus moves from both ears to one ear.     Your hearing loss gets worse within 1 day after an ear injury.     Your tinnitus or hearing loss does not get better within 1 week after an ear injury.     Your tinnitus bothers you enough that you want to take medicines to help you cope with it.     Tinnitus Evaluation Appointment  Tinnitus (tin-ni-tus) is a ringing, buzzing, or other sound in your head. About 50 million people in the [place] have tinnitus at some point in their life.   The tinnitus evaluation appointment offers testing, teaching, and counseling for tinnitus. Our goal is to help you better understand your tinnitus and how to manage its effects.  Who is this evaluation for?  The evaluation appointment is for people whose tinnitus bothers them and lasts 3 to 6 months or longer.  Will my insurance pay for this evaluation?  Check with your insurance company to see if your plan will cover this appointment. The charge codes for the Tinnitus Evaluation are 91643 and 77738. (Your insurance company may ask you for these.)  How does it work?  We will set up a meeting for you with a specialist who treats hearing problems, called an audiologist ([id number]).   Before this meeting you need:  The results of a recent hearing test  An Ear, Nose and Throat doctor (ENT) to say it is okay (called a \"medical clearance\").  To fill out these 2 forms:  Tinnitus History Questionnaire  Tinnitus Handicap Inventory (THI)  What happens at the evaluation?  This will be a 90-minute appointment. Please feel free to bring a family member or friend.   The audiologist will:  Do more " tinnitus testing, if needed.  Review your tinnitus history and questionnaire.  Talk with you about testing and treatment options.  Answer your questions.  Managing your tinnitus won't happen overnight. But we will strive to give you training and skills to help you cope. That way, you can focus on the important things in your life.  How do I get started?  Once you had your hearing test and have seen ENT, please call to set up your first meeting with an audiologist.  [facility] [address], [address], [place]  [phone]    Hypothyroidism: Care Instructions  Your Care Instructions     When you have hypothyroidism, your body doesn't make enough thyroid hormone. This hormone helps your body use energy. If your thyroid level is low, you may feel tired, be constipated, have an increase in your blood pressure, or have dry skin or memory problems. You may also get cold easily, even when it is warm. Women with low thyroid levels may have heavy menstrual periods.  A blood test to find your thyroid-stimulating hormone (TSH) level is used to check for hypothyroidism. A high TSH level may mean that you have it.  The treatment for hypothyroidism is thyroid hormone pills. You should start to feel better in 1 to 2 weeks. Most people need treatment for the rest of their lives. You will need regular visits with your doctor to make sure you are doing well and that you have the right dose of medicine.  Follow-up care is a key part of your treatment and safety. Be sure to make and go to all appointments, and call your doctor if you are having problems. It's also a good idea to know your test results and keep a list of the medicines you take.  How can you care for yourself at home?  Take your thyroid hormone medicine exactly as prescribed. Call your doctor if you think you are having a problem with your medicine. Most people do not have side effects if they take the right amount of  medicine regularly.  Take the medicine 30 minutes before breakfast, and do not take it with calcium, vitamins, or iron.  Do not take extra doses of your thyroid medicine. It will not help you get better any faster, and it may cause side effects.  If you forget to take a dose, do NOT take a double dose of medicine. Take your usual dose the next day.  Tell your doctor about all prescription, herbal, or over-the-counter products you take.  Take care of yourself. Eat a healthy diet, get enough sleep, and get regular exercise.  When should you call for help?   Call 911 anytime you think you may need emergency care. For example, call if:    You passed out (lost consciousness).     You have severe trouble breathing.     You have a very slow heartbeat (less than 60 beats a minute).     You have a low body temperature (95 F or below).   Call your doctor now or seek immediate medical care if:    You feel tired, sluggish, or weak.     You have trouble remembering things or concentrating.     You do not begin to feel better 2 weeks after starting your medicine.   Watch closely for changes in your health, and be sure to contact your doctor if you have any problems.      What is tinnitus?  Tinnitus is the term doctors use for when a person hears ringing, buzzing, hissing, or roaring in 1 or both ears. Many people have this problem. In some people, it can last for months or years. Tinnitus can be annoying. But it is not usually a sign of a serious problem.  What causes tinnitus?  Tinnitus is often caused by damage to cells in a part of the inner ear (figure 1). When these cells are damaged, they send signals to the brain that make you think you are hearing things that are not really there.  The damage that leads to tinnitus can be caused by:  ?Normal aging and hearing loss  ?Loud noise  ?Medicines, including some antibiotics, anti-seizure medicines, and painkillers  ?Head or neck injuries  ?Certain diseases  What are the symptoms  "of tinnitus?        Most people with tinnitus hear a high-pitched, steady ringing. But for some people, it might sound like:  ?Pulsing  ?Rushing  ?Humming  ?Buzzing  ?Roaring  These sounds sometimes get louder or softer during movement or exercise.  Should I see a doctor or nurse?  Yes. If you hear ringing or other sounds that the people around you do not hear, see your doctor or nurse. They can do tests to make sure that there is nothing seriously wrong with your ears.  Is there a treatment for tinnitus?  There is no cure for tinnitus. But there are treatments that can make it easier to live with. These include:  ?Hearing aids - People with tinnitus caused by hearing loss often find that their symptoms are better if they get a hearing aid. Hearing aids make outside sounds clearer and louder, which helps make tinnitus less noticeable.  ?Tinnitus retraining therapy - This involves working with a tinnitus expert to \"retrain\" your brain. You learn to think of the ringing in your ears as normal background sounds. Over time, the sounds bother you less.  ?Biofeedback - This involves learning to breathe deeply, relax, and change your reaction to your tinnitus. Doing this helps you to be less bothered by the sounds.  ?Cognitive behavioral therapy - This involves talking with a psychologist or counselor. You learn ways to distract yourself and cope with tinnitus.  ?Masking - This means covering up tinnitus by listening to music or other soft sounds. You can even get a special masking device for tinnitus. This looks like a hearing aid and makes \"white noise.\"  ?Procedure to treat blood vessels near the ear - This is only done for rare cases where tinnitus is caused by a more serious problem with the blood vessels near the ear.  What will my life be like?  If you have had tinnitus for a long time, it probably will not go away. But it might bother you less over time.  Try to stay positive about your condition. If you are having " a hard time coping, or if you feel anxious or depressed, talk to your doctor or nurse. They can help.  If your tinnitus makes it hard to sleep, talk to your doctor or nurse about that, too. Not getting enough sleep can make tinnitus even harder to cope with. But there might be treatments or other things that can help.        TINNITUS OVERVIEW  Tinnitus is the perception of a ringing, buzzing, hissing, or roaring sound in one or both ears. It may be continuous or intermittent (occasional), can be pulsing or steady, and can range in severity from a soft buzz to a loud ring.  It is estimated that as many as 50 million people in the [place] suffer from chronic tinnitus (lasting longer than six months) [1]; about a quarter of those have tinnitus that is severe enough to interfere with daily activities. Tinnitus is more common in men than women, and it becomes more common with aging [2,3].  Although tinnitus can be annoying, it is not usually a sign of a serious problem. There are ways to mask and adapt to the symptoms to minimize the impact of tinnitus on daily life.  TINNITUS CAUSES  Tinnitus is often caused by damage to the tiny hairs on auditory cells within the inner ear (figure 1). When the cells are damaged or stressed, they change the signal they send to the brain. Sometimes this produces a noise heard only by the patient. Damage to the hair cells can be a result of normal aging, or it can occur after exposure to very loud noise, certain medications, injury, or disease. Sometimes the damage is temporary but the noise is permanent.  Occasionally tinnitus can be a result of problems not related to the hearing system. Disorders of the jaw joint (called the temporomandibular joint), severe anxiety, and neck injuries can cause tinnitus.  Age-related hearing loss -- Normal hearing loss often occurs with advancing age and is frequently accompanied by tinnitus.  Noise-induced hearing loss -- Prolonged exposure to  excessively loud noise (eg, from chainsaws, gunfire, or even loud music) may lead to temporary or permanent hearing loss and tinnitus. A short blast of loud noise also can cause severe to profound hearing loss, pain, or hyperacusis (abnormal sensitivity to noise).  Other causes of auditory system dysfunction -- There are many auditory problems that can lead to hearing loss and tinnitus. These include use of certain medications, stiffening of the bones in the middle ear (otosclerosis), tumors within the auditory system, blood vessels or neurologic disorders, and genetic or inherited inner ear disorders.  TINNITUS SYMPTOMS  A high-pitched steady tone (ringing) is the most common form of tinnitus. Other symptoms can include a pulsation that is rushing or humming and varies in intensity with exercise or changing of body position. A clicking sound may indicates a nerve or muscle abnormality.  TINNITUS DIAGNOSIS  It is important for anyone with tinnitus to be evaluated to ensure that there is not another, more serious problem. In addition, anyone with severe or worsening and continuous ringing, buzzing, or other noises in the ear should be evaluated. Other specialized tests may be recommended.  Hearing tests can help provide more information about the potential cause of tinnitus. Other tests, such as brain imaging with magnetic resonance imaging (MRI) or computed tomography (CT) scans may be needed, depending upon the results of the medical history and physical examination.  TINNITUS MANAGEMENT  The management of tinnitus involves treating any underlying disorders or abnormalities (see 'Tinnitus causes' above) as well as addressing the tinnitus itself. Although there is no cure for most cases of chronic tinnitus, there are ways to manage the condition.  Treatment of underlying problems  Hearing loss -- Hearing aids may improve tinnitus symptoms in people with age-related hearing loss. Hearing aids work by making outside  "sounds clearer and louder, which may cause the tinnitus noise to be less noticeable. People with other causes of hearing loss may benefit from surgery to the outer or middle ear.  Cochlear implants are devices that are implanted in the inner ear and use electrical stimulation to help improve hearing. They may be recommended for adults or children with severe to profound hearing loss who do not benefit from hearing aids. These implants may help relieve tinnitus in some cases; however, they are only available in selected cases and a full evaluation is required before consideration.  In patients with auditory system damage due to use of medication, stopping the medication may improve tinnitus and prevent hearing loss from worsening. Patients should consult with a health care provider before starting or stopping any medications.  Depression -- Depression is common in people with tinnitus. Safe and effective treatments for depression are available and antidepressant medications may improve the symptoms of tinnitus in some people. Antidepressants can also improve a person's ability to cope with the problem. (See \"Patient education: Depression in adults (Beyond the Basics)\" and \"Patient education: Depression treatment options for adults (Beyond the Basics)\".)  Insomnia -- People who have difficulty sleeping as a result of tinnitus may be treated with medications and/or behavior changes to improve sleep. (See \"Patient education: Insomnia treatments (Beyond the Basics)\".)  Behavioral therapies -- A number of behavioral therapies can help a person to live with chronic tinnitus.  Tinnitus retraining therapy -- Tinnitus retraining therapy (TRT) involves retraining the subconscious part of the auditory system to accept the sounds associated with tinnitus as normal, natural sounds rather than annoying sounds [4]. The goal is for the person to become unaware of their tinnitus unless they consciously choose to focus on it.  TRT is " performed by experts in a tinnitus center; it includes counseling as well as the use of a wearable device that emits low-level noise and environmental sounds. Although TRT has demonstrated short-term success in many tinnitus sufferers, it requires a commitment to the program; the noise-generating device may need to be worn for one to two years. Studies show up to 80 percent of patients find some relief of their tinnitus with TRT.  Masking -- Masking devices resemble hearing aids and produce low-level sounds, which help to reduce or eliminate the tinnitus noise in some patients [5]. Masking does not relieve tinnitus in all patients. Some people report a worsening of their tinnitus with masking. Those who do experience relief typically find that tinnitus returns after the masking device is removed.  Patients can also achieve a lesser degree of masking by listening to quiet music or creating low background noise with a radio on low volume, a fan, a white noise machine, or pillow speakers. This may be especially helpful for people with tinnitus that is bothersome in quiet environments.  Biofeedback and stress reduction -- Biofeedback is a relaxation technique that teaches a person to control certain body functions, such as heart and breathing rate. Biofeedback may help people to manage tinnitus-related distress by changing their reaction to it. Some people experience relief of tinnitus symptoms once they are able to stop thinking of their tinnitus as bothersome or stressful [6].  Cognitive behavioral therapy -- The goal of cognitive behavioral therapy (CBT) is to teach patients to manage their psychological responses to tinnitus. It involves using coping strategies, distraction skills, and relaxation techniques.  Other therapies -- A number of other types of treatments for tinnitus have been studied, although none have been found to reliably be more effective than placebo. Nevertheless, many tinnitus support groups have  members who are helped by these treatments. Individual patients who respond may be experiencing a true benefit.  ?Electrical stimulation - Electrical stimulation of certain parts of the inner ear can be accomplished by placing electrodes on the skin or using acupuncture needles (see below). There are different modes of delivering electrical stimulation, depending on the patient's situation and other factors such as whether hearing loss is present.  ?Acupuncture - Acupuncture involves inserting hair-thin metal needles into the skin at specific points on the body. It causes little to no pain. Acupuncture may be used alone or in combination with electric stimulation.  ?Repetitive transcranial magnetic stimulation - During repetitive transcranial magnetic stimulation (rTMS), a powerful magnetic field is used to stimulate the brain. This treatment shows promise for some tinnitus sufferers but is still experimental.  ?Herbal remedies - Combinations of herbs (also called botanicals) are often promoted as treatments for medical problems. Herbal medicines may come in the form of a powder, liquid, or pill. Ginkgo biloba and melatonin have both been studied for use in treating tinnitus. While some patients report benefit from these types of treatment, there are no studies with high rates of improvement in tinnitus from herbal remedies.  ?Vitamins and minerals - Vitamins and minerals that have been studied for treatment of tinnitus and inner-ear health include niacin (a B vitamin), zinc, and copper. While some patients report benefit from these types of treatment, there are no studies with high rates of improvement in tinnitus from vitamin or mineral remedies.  PROGNOSIS  The impact of tinnitus on everyday life varies, often depending upon the severity of the tinnitus noise. About 25 percent of sufferers report an increase in tinnitus severity over time [7]. Long-term tinnitus is unlikely to go away completely. However, it  often becomes less bothersome over time, especially when hearing loss is also present.        Patient understood and verbally consented to the treatment plan. Discussed symptoms that would warrant an urgent or emergent visit. All of the patients' questions were answered. Patient was instructed to contact the clinic if questions or concerns arise. Recommend follow up appointments if symptoms worsen or fail to improve. Recommend follow up as needed. Recommend ER in the case of an emergency.    Becki Rodriguez PA-C    Please note: Voice recognition software may have been used in preparing this note, unintended word substitutions may be present.        Wayne Mac is a 43 year old, presenting for the following health issues:  Ear Problem (/)        2/6/2024    12:52 PM   Additional Questions   Roomed by Isidra IRELAND     History of Present Illness       Reason for visit:  Ear ringing  Symptom onset:  More than a month  Symptoms include:  Ringing  Symptom intensity:  Mild  Symptom progression:  Worsening  Had these symptoms before:  No  What makes it worse:  No  What makes it better:  No    She eats 2-3 servings of fruits and vegetables daily.She consumes 1 sweetened beverage(s) daily.She exercises with enough effort to increase her heart rate 10 to 19 minutes per day.  She exercises with enough effort to increase her heart rate 3 or less days per week.   She is taking medications regularly.     Patient got her TSH lab drawn today.  Her levothyroxine dose has been stable.  She does take her levothyroxine in the morning with her birth control medication.  She with around 1 hour prior to eating.  She has had months of earring over the right ear.  She noticed it more in the evening.  She has been noticing it more daily.  She reports it is a high-pitched ringing.  She does not have any changes in her hearing patterns.  No history of ear infections, ear issues, prior ear surgeries, or ear tubes.  Negative for ear pain or  "ear pressure.  Reports mildly muffled hearing over the years.  She has not been seen by ENT.  She does have chronic headaches and she has been taking Excedrin 2 tablets every 6 hours as needed over the past 2 years.  Reports she takes Excedrin a few times a week.  She notes that she has right-sided elbow pain and notices it more when she is lifting up her daughter.  She has concerns about tennis elbow.  She also has questions regarding weight loss medications.  She was prescribed Wegovy in the past and she was not able to fill it because was out of stock.  We discussed a low supply and patient is asking about additional options.  The appointment went over in time today and I recommended scheduling a follow-up appointment to further discuss weight loss medications and develop a treatment plan.  See tinnitus questionnaire below.                Review of Systems  Constitutional, HEENT, cardiovascular, pulmonary, GI, , musculoskeletal, neuro, skin, endocrine and psych systems are negative, except as otherwise noted.      Objective    /84   Pulse 88   Temp 97.8  F (36.6  C) (Temporal)   Resp 14   Ht 1.683 m (5' 6.26\")   Wt 91.6 kg (202 lb)   SpO2 99%   BMI 32.35 kg/m    Body mass index is 32.35 kg/m .  Physical Exam   GENERAL: alert and no distress  EYES: Eyes grossly normal to inspection, PERRL and conjunctivae and sclerae normal  HENT: ear canals and TM's retracted bilaterally, no cerumen impaction, no erythema/bulging,   MS: no gross musculoskeletal defects noted,.  Right elbow: Forearm tenderness noted, pain with wrist extension against resistance.  SKIN: no suspicious lesions or rashes  NEURO: Normal strength and tone, mentation intact and speech normal  PSYCH: mentation appears normal, affect normal/bright    Admission on 01/09/2024, Discharged on 01/09/2024   Component Date Value Ref Range Status    Case Report 01/09/2024    Final                    Value:Surgical Pathology Report                    "      Case: [id number]                                  Authorizing Provider:  Robi Soto MD       Collected:           01/09/2024 01:21 PM          Ordering Location:     [facility]          Received:            01/09/2024 01:53 PM                                 [facility]                                                          Pathologist:           Julien Guevara MD PhD                                                      Specimens:   A) - Small Intestine, Duodenum, Duodenal biopsy                                                     B) - Stomach, Antrum, Antral biopsy                                                                 C) - Large Intestine, Colon, Cecum, Cecal polyp                                                     D) - Large Intestine, Colon, Sigmoid, Sigmoid colon polyp                                  Final Diagnosis 01/09/2024    Final                    Value:This result contains rich text formatting which cannot be displayed here.    Comment 01/09/2024    Final                    Value:This result contains rich text formatting which cannot be displayed here.    Clinical Information 01/09/2024    Final                    Value:This result contains rich text formatting which cannot be displayed here.    Gross Description 01/09/2024    Final                    Value:This result contains rich text formatting which cannot be displayed here.    Microscopic Description 01/09/2024    Final                    Value:This result contains rich text formatting which cannot be displayed here.    Special Stains 01/09/2024    Final                    Value:This result contains rich text formatting which cannot be displayed here.    Performing Labs 01/09/2024    Final                    Value:This result contains rich text formatting which cannot be displayed here.    Upper GI Endoscopy 01/09/2024    Final                    Value:[facility]  [address] [facility] [place] [address]  _______________________________________________________________________________  Patient Name: Estefania Arguello          Procedure Date: 1/9/2024 1:14 PM  MRN: [id number]                       Account Number: [id number]  YOB: 1980              Admit Type: Outpatient  Age: 43                               Room: [place] [address]  Gender: Female                        Note Status: Finalized  Attending MD: JEANETTE CAPONE MD,   Total Sedation Time:   _______________________________________________________________________________     Procedure:             Upper GI endoscopy  Indications:           Personal history of malignant gastric neoplasm  Providers:             JEANETTE CAPONE MD  Referring MD:          JEANETTE CAPONE MD  Medicines:             Propofol per Anesthesia  Complications:         No immediate complications.  _____________________________________________________________________________                          __  Procedure:             Pre-Anesthesia Assessment:                         - Prior to the procedure, a History and Physical was                          performed, and patient medications, allergies and                          sensitivities were reviewed. The patient's tolerance                          of previous anesthesia was reviewed.                         - Pre-procedure physical examination revealed no                          contraindications to sedation.                         After obtaining informed consent, the endoscope was                          passed under direct vision. Throughout the procedure,                          the patient's blood pressure, pulse, and oxygen                          saturations were monitored continuously. The                          Endosonoscope was introduced through the mouth, and                          advanced to the second part of duodenum.                                                                                    Fi                          ndings:       The examined esophagus was normal.       The Z-line was regular and was found 37 cm from the incisors.       Localized mild inflammation characterized by linear erosions was found        in the prepyloric region of the stomach. Biopsies were taken with a cold        forceps for Helicobacter pylori testing.       Localized mild inflammation characterized by linear erosions was found        in the duodenal bulb. Biopsies for histology were taken with a cold        forceps for evaluation of celiac disease.                                                                                   Impression:            - Normal esophagus.                         - Z-line regular, 37 cm from the incisors.                         - Gastritis. Biopsied.                         - Duodenitis. Biopsied.  Recommendation:        - Use a proton pump inhibitor PO daily. Over the                          counter is OK                         - Await pathology results.                                                                                                             Procedure Code(s):       --- Professional ---       27055, Esophagogastroduodenoscopy, flexible, transoral; with biopsy,        single or multiple    CPT copyright 2022 American Medical Association. All rights reserved.    The codes documented in this report are preliminary and upon  review may   be revised to meet current compliance requirements.    Robi Capone  ___________________  ROBI CAPONE MD  1/9/2024 1:26:24 PM  I was physically present for the entire viewing portion of the exam.ROBI CAPONE MD  Number of Addenda: 0    Note Initiated On: 1/9/2024 1:14 PM      COLONOSCOPY 01/09/2024    Final                    Value:[address]  [address]  _______________________________________________________________________________  Patient Name: Estefania Arguello          Procedure Date: 1/9/2024 1:24 PM  MRN: [id number]                       Account Number: [id number]  YOB: 1980              Admit Type: Outpatient  Age: 43                               Room: [place] [address]  Gender: Female                        Note Status: Finalized  Attending MD: JEANETTE CAPONE MD,   Total Sedation Time:   _______________________________________________________________________________     Procedure:             Colonoscopy  Indications:           Rectal bleeding  Providers:             JEANETTE CAPONE MD  Referring MD:          JEANETTE CAPONE MD  Medicines:             Propofol per Anesthesia  Complications:         No immediate complications.  _______________________________________________________________________________  Procedure:             Pre-Anesthe                          dominga Assessment:                         - Prior to the procedure, a History and Physical was                          performed, and patient medications, allergies and                          sensitivities were reviewed. The patient's tolerance                          of previous anesthesia was reviewed.                         - Pre-procedure physical examination revealed no                          contraindications to sedation.                         After obtaining informed consent, the colonoscope was                          passed under direct vision. Throughout the procedure,                          the patient's blood pressure, pulse, and oxygen                          saturations were monitored continuously. The                          Colonoscope was introduced through the anus and                          advanced to the cecum, identified by appendiceal                          orifice and ileocecal valve.                                                                                                              Findings:       An anal fissure was found on perianal exam.       A 5 mm polyp was found in the cecum. The polyp was sessile. The polyp        was removed with a cold snare. Resection and retrieval were complete.       A 16 mm polyp was found in the sigmoid colon. The polyp was        semi-pedunculated. The polyp was removed with a cold snare. Resection        and retrieval were complete.       No additional abnormalities were found on retroflexion.                                                                                   Impression:            - Anal fissure found on perianal exam.                         - One 5 mm polyp in the cecum, removed with a cold                          snare. Resected and retrieved.                         - One 16 mm polyp in the sigmoid colon, removed with a                          cold snare. Resected and retrieved.  Recommendation:        - High fiber diet.                         - Await pathology results.                                                   - Repeat colonoscopy in 5 years for surveillance.                                                                                   Procedure Code(s):       --- Professional ---       53163, Colonoscopy, flexible; with removal of tumor(s), polyp(s), or        other lesion(s) by snare technique    CPT copyright 2022 American Medical Association. All rights reserved.    The codes documented in this report are preliminary and upon  review may   be revised to meet current compliance requirements.    Robi Capone  ___________________  ROBI CAPONE MD  1/9/2024 1:43:35 PM  I was physically present for the entire viewing portion of the exam.ROBI CAPONE MD  Number of Addenda: 0    Note Initiated On: 1/9/2024 1:24 PM       Results for orders placed or performed in visit on 02/06/24   TSH with free T4 reflex     Status: Normal   Result Value Ref Range    TSH 0.91  0.30 - 4.20 uIU/mL     No results found.        Signed Electronically by: Becki Rodriguez PA-C

## 2024-02-07 LAB
TTG IGA SER-ACNC: 10 U/ML
TTG IGG SER-ACNC: 1.3 U/ML

## 2024-02-07 RX ORDER — DESOGESTREL AND ETHINYL ESTRADIOL 0.15-0.03
KIT ORAL
Qty: 112 TABLET | Refills: 0 | OUTPATIENT
Start: 2024-02-07

## 2024-02-07 NOTE — TELEPHONE ENCOUNTER
Medication Question or Refill    Contacts         Type Contact Phone/Fax    02/06/2024 06:30 PM CST Phone (Incoming) Estefania Arguello (Self) [phone] (H)            What medication are you calling about (include dose and sig)?: APRI 0.15-30 MG-MCG tablet   Semaglutide-Weight Management (WEGOVY) 0.25 MG/0.5ML pen     Preferred Pharmacy:   [id number] [place], [place] - [address] [place] [address]  Phone: [phone] Fax: [phone]      Controlled Substance Agreement on file:   CSA -- Patient Level:    CSA: None found at the patient level.       Who prescribed the medication?: Anyi Holder MD     Do you need a refill? Yes    When did you use the medication last? 2/6/24-Apri  Just gonna be starting however pharm suggested getting a refill in right away as this med is hard to obtain(Wegovy)    Patient offered an appointment? Yes: Accepted    Do you have any questions or concerns?  No      Could we send this information to you in [facility] or would you prefer to receive a phone call?:   Patient would prefer a phone call   Okay to leave a detailed message?: Yes at Cell number on file:    Telephone Information:   Mobile [phone]

## 2024-02-08 NOTE — RESULT ENCOUNTER NOTE
Hello -    Here are my comments about the recent results.  TG antibody IgA is elevated.  With recent biopsy concerning for celiac disease, I recommend adhering to a gluten-free diet and scheduling a follow-up appointment.    Please let us know if you have any questions or concerns.    Regards,  Becki Rodriguez PA-C

## 2024-02-08 NOTE — TELEPHONE ENCOUNTER
Called patient to relay the below message from provider.    Patient states she made an in person appointment to discuss medications but that is not until 3/1/24. She states she just started the Wegovy yesterday because pharmacy had not had it in stock. She states the pharmacy told her to ask for a refill of the increased dose right away because of the shortages. She was unsure if she should have an appointment to discuss it so soon since she just started it.     She states she is willing to make a phone appointment sooner than 3/1/24 if this would be appropriate to discuss mediations?     Patient also states she saw her lab comment from provider this morning about biopsy and celiac disease. She states she read that the provider recommended gluten free diet and a follow up appointment. She would like clarification on what this means, does this mean she has celiac disease?     JS AlvaradoN, RN        Becki Rodriguez PA-C   to [facility] Triage     2/8/24  6:58 AM  Refilled denied. Due for an appointment. Please call and assist with scheduling.    Sincerely,  Becki Rodriguez PA-C

## 2024-02-09 PROBLEM — K29.80 DUODENITIS DETERMINED BY BIOPSY: Status: ACTIVE | Noted: 2024-02-09

## 2024-02-09 NOTE — TELEPHONE ENCOUNTER
Per chart review, surgical pathology report comment from 1/9/2024:    Part A - Although not entirely specific, in the appropriate clinical context and laboratory findings, the histologic pattern is highly suggestive of celiac disease (marsh level 3b). Other differential diagnostic possibilities include autoimmune disorders, food protein allergies, Crohn's disease, infections, among others. Additional reactive features consistent with peptic duodenitis are present.     I recommend follow-up with GI and nutrition for further evaluation and management.  Referrals were placed.    Celiac Disease: Care Instructions  Overview  Celiac disease (or celiac sprue) is a problem with digesting gluten. Gluten is a type of protein found in wheat, rye, and other grains. This problem starts when the body's immune system attacks the small intestine when gluten is eaten. The immune system is supposed to fight off viruses and other invaders, but sometimes it turns on the person's own body. (This is called an autoimmune disease.) Celiac disease seems to run in families.  Celiac disease causes damage to the small intestine. This makes it hard for the body to absorb vitamins and other nutrients. You cannot prevent celiac disease. But you can stop and reverse the damage to the small intestine by eating a strict gluten-free diet.  Follow-up care is a key part of your treatment and safety. Be sure to make and go to all appointments, and call your doctor if you are having problems. It's also a good idea to know your test results and keep a list of the medicines you take.  How can you care for yourself at home?    Eat a gluten-free diet to prevent symptoms and damage to the small intestine. Even a small amount of gluten may cause damage.  ? Avoid all foods that contain wheat, rye, and barley. Foods that are often made with these grains include bread, bagels, pasta, pizza, malted breakfast cereals, and crackers.  ? Avoid oats, at least at first.  "Oats may cause symptoms in some people. The oats may be contaminated with wheat, barley, or rye from processing. But many people who have celiac disease can eat moderate amounts of oats without having symptoms. Health professionals vary in their long-term recommendations regarding eating foods with oats. But most agree it is safe to eat oats labeled as gluten-free.     Talk to your doctor or contact your local hospital or dietitian for information about support groups in your area. You may find a support group helpful for discovering ways to help you deal with celiac disease. Celiac disease support groups often share recipes and good food sources.    Look for gluten-free foods. Many food stores, especially health food stores, offer specially marked gluten-free food.   Go to https://www.[employer].net/patiented  Enter O343 in the search box to learn more about \"Celiac Disease: Care Instructions.\"  Current as of: March 21, 2023               Content Version: 13.8    3147-0859 [employer].  "

## 2024-02-09 NOTE — TELEPHONE ENCOUNTER
RN TRIAGE CALL:    Patient Contact    Attempt # 1    Was call answered?  No.  Left message on voicemail with information to call me back.    Upon callback, please relay the below response from provider.    VICENTE Alvarado, RN        Becki Rodriguez PA-C  [facility] 1 Triage1 hour ago (6:45 AM)     KW  Recommend virtual appointment to further discuss medications and changing medication doses. We did not discuss the weight loss medication during her last visit and I recommended scheduling a follow-up to further discuss.  See instructions from my note below.  Her celiac disease blood work shows evidence of celiac disease and I recommend follow-up for further discussion.    Instructions from note:  Obesity:  She also has questions regarding weight loss medications.  She was prescribed Wegovy in the past and she was not able to fill it because was out of stock.  We discussed a low supply and patient is asking about additional options.  The appointment went over in time today and I recommended scheduling a follow-up appointment to further discuss weight loss medications and develop a treatment plan.  We discussed potential other weight loss medications.  I recommend making changes with diet and exercise, see additional information below.  Contact the clinic if you would like referral to nutrition or weight management  Schedule follow-up appointment to further discuss weight management    Becki Rodriguez PA-C

## 2024-02-09 NOTE — TELEPHONE ENCOUNTER
"Patient is phoning back.  Informed patient of documentation per [facility] as stated below.  Patient stated understanding but had questions on the evidence of \"Celiac disease\".    Patient stated she didn't understand if there were further tests to be done to confirm Celiac disease and would like to know if she needs to completely change her diet right away or if this is just a \"possibility\" that she has this disease.  She stated she is concerned that there is not a note on confirmation and the next steps.  She stated if this is something she needs to do with diet change right away she would really like to hear that confirmation.    Patient was scheduled with Kelsea Noriega for follow up on Wegovy and has a scheduled visit with Dr. Isaiah MD for her follow up on the endoscopy/ colonoscopy results.      Patient verbalized she would like a message sent to Dr. Isaiah MD asking if she should be changing her diet immediately and if there is a next step in testing to confirm celiac disease prior to her appointment with him scheduled on 3/1/2024.  She stated she would really like to have someone call her back on these questions.    Will forward to Dr. Isaiah MD per patient request for review.    Hui Ling RN    "

## 2024-02-12 ENCOUNTER — TELEPHONE (OUTPATIENT)
Dept: GASTROENTEROLOGY | Facility: CLINIC | Age: 44
End: 2024-02-12
Payer: COMMERCIAL

## 2024-02-12 NOTE — TELEPHONE ENCOUNTER
[facility] Call Center    Phone Message    May a detailed message be left on voicemail: yes     Reason for Call: Other: Requesting a call back, has some medical questions in regards to celiac disease.     Action Taken: Other: ph gastro    Travel Screening: Not Applicable

## 2024-02-16 NOTE — TELEPHONE ENCOUNTER
JARETH advising the questions she has about celiac disease will be discussed at her appointment on 3/4/2024 with CONSUELO Gross.  Casie Walker cma.....2/16/2024 at 9:57 AM

## 2024-02-19 ENCOUNTER — VIRTUAL VISIT (OUTPATIENT)
Dept: FAMILY MEDICINE | Facility: CLINIC | Age: 44
End: 2024-02-19
Payer: COMMERCIAL

## 2024-02-19 DIAGNOSIS — Z86.32 HISTORY OF GESTATIONAL DIABETES MELLITUS (GDM): ICD-10-CM

## 2024-02-19 DIAGNOSIS — E66.09 CLASS 1 OBESITY DUE TO EXCESS CALORIES WITHOUT SERIOUS COMORBIDITY WITH BODY MASS INDEX (BMI) OF 31.0 TO 31.9 IN ADULT: Primary | ICD-10-CM

## 2024-02-19 DIAGNOSIS — Z30.41 ORAL CONTRACEPTIVE PILL SURVEILLANCE: ICD-10-CM

## 2024-02-19 DIAGNOSIS — E66.811 CLASS 1 OBESITY DUE TO EXCESS CALORIES WITHOUT SERIOUS COMORBIDITY WITH BODY MASS INDEX (BMI) OF 31.0 TO 31.9 IN ADULT: Primary | ICD-10-CM

## 2024-02-19 PROCEDURE — 99442 PR PHYSICIAN TELEPHONE EVALUATION 11-20 MIN: CPT | Mod: 93 | Performed by: NURSE PRACTITIONER

## 2024-02-19 RX ORDER — DESOGESTREL AND ETHINYL ESTRADIOL 0.15-0.03
KIT ORAL
Qty: 112 TABLET | Refills: 0 | Status: SHIPPED | OUTPATIENT
Start: 2024-02-19 | End: 2024-04-24

## 2024-02-19 NOTE — PROGRESS NOTES
"    Instructions Relayed to Patient by Virtual Roomer:     Patient is active on [employer]:   Relayed following to patient: \"It looks like you are active on [employer], are you able to join the visit this way? If not, do you need us to send you a link now or would you like your provider to send a link via text or email when they are ready to initiate the visit?\"    Reminded patient to ensure they were logged on to virtual visit by arrival time listed. Documented in appointment notes if patient had flexibility to initiate visit sooner than arrival time. If pediatric virtual visit, ensured pediatric patient along with parent/guardian will be present for video visit.     Patient offered the website [email or web address] and/or phone number to [employer] Help line: [phone]    Estefania is a 43 year old who is being evaluated via a billable telephone visit.      What phone number would you like to be contacted at? [phone]  How would you like to obtain your AVS? [employer]ul[employer]    Distant Location (provider location):  On-site    Assessment & Plan     Class 1 obesity due to excess calories without serious comorbidity with body mass index (BMI) of 31.0 to 31.9 in adult    History of gestational diabetes mellitus (GDM)    Oral contraceptive pill surveillance    Tolerating Wegovy at 0.25mg dose. Refill placed for 0.5mg dose.   Follow-up with PCP for ongoing refills and dose adjustments.   Encouraged to continue with GI and work-up for Celiac as directed  Continue with increased exercise and dietary modifications.   Refill of oral contraceptives sent to pharmacy as well.   Follow-up with any new or worsening symptoms, questions or concerns.     I explained my diagnostic considerations and recommendations to the patient, who voiced understanding and agreement with the assessment and treatment plan. All questions were answered to patient's apparent satisfaction. We discussed potential side effects of any prescribed or " "recommended therapies, as well as expectations for response to treatments and importance of lifestyle measures that may improve symptoms. Patient was advised to contact our office if there are new symptoms or no improvement or worsening of conditions or symptoms.                    Wayne Mac is a 43 year old, presenting for the following health issues:  Weight Loss      2/19/2024    12:17 PM   Additional Questions   Roomed by jitendra   Accompanied by self     HPI     Pt wasn't sure what new condition was in mychart and also wanted to talk about that    Medication Followup of Wegovy  Taking Medication as prescribed: yes  Side Effects:  None - maybe a little soreness in stomach and goes away  Medication Helping Symptoms:  yes            Objective       Estefania calls today to check in after starting Wegovy. She started the injections 2 weeks ago, she will have her third injection later this week. She denies any side effects. She notes she does have chronic headaches, however does not feel this has worsened after starting the medication. She denies any GI symptoms, however notes she has been seen in regards of Celiac disease and will be seeing GI later this month. She has been changing her diet in relation to this. She notes \"a few\" pounds of weight loss. She denies any significant nausea, she has had some decrease in appetite but is unsure if this is related to changing her diet or the medication. She is scheduled for follow-up with her PCP in April, 2024. She does also request a refill of the Wegovy today and dose increase.     Vitals:  No vitals were obtained today due to virtual visit.    Physical Exam   General: Alert and no distress //Respiratory: No audible wheeze, cough, or shortness of breath // Psychiatric:  Appropriate affect, tone, and pace of words            Phone call duration: 13 minutes  Signed Electronically by: Kelsea Noriega NP    "

## 2024-02-25 ENCOUNTER — HEALTH MAINTENANCE LETTER (OUTPATIENT)
Age: 44
End: 2024-02-25

## 2024-02-28 ENCOUNTER — MYC MEDICAL ADVICE (OUTPATIENT)
Dept: GASTROENTEROLOGY | Facility: CLINIC | Age: 44
End: 2024-02-28
Payer: COMMERCIAL

## 2024-02-29 ENCOUNTER — NURSE TRIAGE (OUTPATIENT)
Dept: FAMILY MEDICINE | Facility: CLINIC | Age: 44
End: 2024-02-29
Payer: COMMERCIAL

## 2024-02-29 NOTE — TELEPHONE ENCOUNTER
Nurse Triage SBAR    Is this a 2nd Level Triage? NO    Situation: Patient reports a 10 day history of cough and congestion. She is not having fevers, wheezing or shortness of breath. She reports she is coughing up some phlegm but it is clear to white in color.   She reports she had a bad headache yesterday, but feels a little better today after starting on Mucinex. She reports she is having coughing fits.    Background: 10 day history of a cough    Assessment: Patient has had an ongoing cough for 10 days. Feels as though it may be moving in to her chest. Discussed continuing on Mucinex if it is helping and increasing fluids. She will go to  in [place] if she worsens as there are not appts in clinic and her appt was cancelled today with Dr. Colon    Protocol Recommended Disposition:   See in Office Today or Tomorrow    Recommendation: Patient will go to  either this evening or tomorrow if she worsens.  Discussed red flag symptoms and when to go to ED      Does the patient meet one of the following criteria for ADS visit consideration? No      VICENTE Vazquez, RN          Reason for Disposition   Continuous (nonstop) coughing interferes with work or school and no improvement using cough treatment per Care Advice    Additional Information   Negative: Bluish (or gray) lips or face   Negative: SEVERE difficulty breathing (e.g., struggling for each breath, speaks in single words)   Negative: Rapid onset of cough and has hives   Negative: Coughing started suddenly after medicine, an allergic food or bee sting   Negative: Difficulty breathing after exposure to flames, smoke, or fumes   Negative: Sounds like a life-threatening emergency to the triager   Negative: Previous asthma attacks and this feels like asthma attack   Negative: Dry cough (non-productive; no sputum or minimal clear sputum) and within 14 days of COVID-19 Exposure   Negative: MODERATE difficulty breathing (e.g., speaks in phrases, SOB even at  rest, pulse 100-120) and still present when not coughing   Negative: Chest pain present when not coughing   Negative: Passed out (i.e., fainted, collapsed and was not responding)   Negative: Patient sounds very sick or weak to the triager   Negative: MILD difficulty breathing (e.g., minimal/no SOB at rest, SOB with walking, pulse <100) and still present when not coughing   Negative: Coughed up > 1 tablespoon (15 ml) blood (Exception: Blood-tinged sputum.)   Negative: Fever > 103 F (39.4 C)   Negative: Fever > 101 F (38.3 C) and over 60 years of age   Negative: Fever > 100.0 F (37.8 C) and has diabetes mellitus or a weak immune system (e.g., HIV positive, cancer chemotherapy, organ transplant, splenectomy, chronic steroids)   Negative: Fever > 100.0 F (37.8 C) and bedridden (e.g., CVA, chronic illness, recovering from surgery)   Negative: Increasing ankle swelling   Negative: Wheezing is present   Negative: SEVERE coughing spells (e.g., whooping sound after coughing, vomiting after coughing)   Negative: Coughing up may-colored (reddish-brown) or blood-tinged sputum   Negative: Fever present > 3 days (72 hours)   Negative: Fever returns after gone for over 24 hours and symptoms worse or not improved   Negative: Using nasal washes and pain medicine > 24 hours and sinus pain persists   Negative: Known COPD or other severe lung disease (i.e., bronchiectasis, cystic fibrosis, lung surgery) and worsening symptoms (i.e., increased sputum purulence or amount, increased breathing difficulty)    Protocols used: Cough-A-OH

## 2024-03-01 NOTE — PROGRESS NOTES
Gastroenterology CLINIC VISIT, NEW PATIENT    CC/REFERRING PROVIDER: Becki Rodriguez  REASON FOR CONSULTATION: gluten intolerance    HPI: 43 year old female with PMH of hemorrhoids, hypothyroidism, and chronic low back pain, was referred  to GI clinic for consult on suspected diagnosis of celiac disease. Patient stated that she requested EGD due to family hx of stomach cancer in maternal uncle and aunt. Accidental findings of erosive gastritis and duodenopathy. Pathology report was suggestive for prominent villous blunting and intraepithelial lymphocytosis of small bowel mucosa biopsy. TTG IgA was slightly elevated. Patient is questioning if she has celiac disease. She denies nausea, bloating, diarrhea, indigestion, acid reflux, skin rashes, joint pain, or abdominal pain. Denies family hx of celiac disease. She started gluten free diet 3 weeks ago but did not notice any difference in her health status.    Her main concern today is rectal discomfort (itching, pain) and small amount of red blood on wipes after defecation. She saw Dr. Mock and was diagnosed with anal fissure. Said that nifedipine cream is helping, but she feels like her fissure re-opened last week, when she had viral infection with one day of diarrhea. Asking for a referral to Botox injection.  She is also wondering about abnormal polyp of colon on colonoscopy. Denies problems with constipation. Having 1-2 formed stools a day. No weight loss. No black stools. No family Hx of colon cancer.    PREVIOUS ENDOSCOPY:  1/9/2024 EGD  Findings:       The examined esophagus was normal.        The Z-line was regular and was found 37 cm from the incisors.        Localized mild inflammation characterized by linear erosions was found        in the prepyloric region of the stomach. Biopsies were taken with a cold        forceps for Helicobacter pylori testing.        Localized mild inflammation characterized by linear erosions was found        in the duodenal  bulb. Biopsies for histology were taken with a cold        forceps for evaluation of celiac disease.       1/9/2024 Colonoscopy  Findings:       An anal fissure was found on perianal exam.        A 5 mm polyp was found in the cecum. The polyp was sessile. The polyp        was removed with a cold snare. Resection and retrieval were complete.        A 16 mm polyp was found in the sigmoid colon. The polyp was        semi-pedunculated. The polyp was removed with a cold snare. Resection        and retrieval were complete.        No additional abnormalities were found on retroflexion.       Final Diagnosis   A(1).  Duodenum, biopsy:  -Small bowel mucosa with prominent villous blunting and intraepithelial lymphocytosis (see comment).  -Focal features of peptic duodenitis.  -Normal component of lamina propria plasma cells identified.  -Negative for luminal organisms.  -Negative for granulomas.  -Negative for dysplasia or malignancy.     B(2). Stomach, antrum, biopsy  -Antral type gastric mucosa with mild chronic inflammation.  -Negative for H. Pylori organisms on immunohistochemical stains.  - Negative for intestinal metaplasia.   -Negative for dysplasia or malignancy      C(3).   Colon, Cecum, polyp, polypectomy:  -Acellular debris and vegetable matter only.  -Insufficient for further diagnosis.      D(4).   Colon, Sigmoid, polyp, polypectomy:  -Fragments of tubular adenoma with focal high-grade dysplasia  -Negative for high-grade dysplasia and malignancy.          Comment     Part A - Although not entirely specific, in the appropriate clinical context and laboratory findings, the histologic pattern is  highly suggestive of celiac disease (marsh level 3b).  Other differential diagnostic possibilities include autoimmune disorders, food protein allergies, Crohn's disease, infections, among others.  Additional reactive features consistent with peptic duodenitis are present.         PERTINENT STUDIES Reviewed in EMR      ROS:  "10pt ROS performed and otherwise negative.    PAST MEDICAL HISTORY:  Past Medical History:   Diagnosis Date    Arthritis 2023    I think im starting to notice arthritis in my hands    Back pain     Hypothyroidism     Seasonal allergies        PREVIOUS ABDOMINAL/GYNECOLOGIC SURGERIES:  Past Surgical History:   Procedure Laterality Date    BIOPSY  ?    I ve had a couple of tumors biopsied over the years. Also had some tissue biopsied during my last endoscopy    COLONOSCOPY N/A 01/09/2024    Procedure: COLONOSCOPY, FLEXIBLE, WITH LESION REMOVAL USING SNARE;  Surgeon: Robi Soto MD;  Location: [facility]    DILATION AND CURETTAGE SUCTION N/A 03/08/2022    Procedure: DILATION AND CURETTAGE, UTERUS, USING SUCTION;  Surgeon: Bairon Thomas MD;  Location: [facility]    ESOPHAGOSCOPY, GASTROSCOPY, DUODENOSCOPY (EGD), COMBINED N/A 01/09/2024    Procedure: ESOPHAGOGASTRODUODENOSCOPY, WITH BIOPSY;  Surgeon: Robi Soto MD;  Location: [facility]    EYE SURGERY      \"benign tumor above right eye\"    MOUTH SURGERY      \"benign tumor under molar removed\"         PERTINENT MEDICATIONS:  Current Outpatient Medications   Medication Sig Dispense Refill    COMPOUNDED NON-CONTROLLED SUBSTANCE (CMPD RX) - PHARMACY TO MIX COMPOUNDED MEDICATION Please Compound 0.3% Nifedipine in 1.5% Lidocaine ointment  Apply 1 g topically to affected rectal area BID 60 g 3    desogestrel-ethinyl estradiol (APRI) 0.15-30 MG-MCG tablet Take 1 tablet by mouth daily Active pills continuously, skip placebos 112 tablet 0    famotidine (PEPCID) 20 MG tablet Take 1 tablet (20 mg) by mouth 2 times daily 180 tablet 0    levothyroxine (SYNTHROID/LEVOTHROID) 100 MCG tablet Take 1 tablet (100 mcg) by mouth daily 90 tablet 3    Multiple Vitamins-Minerals (MULTI FOR HER) CAPS Take 1 capsule by mouth daily      omeprazole (PRILOSEC) 40 MG DR capsule Take 1 capsule (40 mg) by mouth daily 90 capsule 3    Semaglutide-Weight Management (WEGOVY) 0.25 MG/0.5ML pen Inject " 0.5 mg Subcutaneous once a week for 4 doses 4 mL 0    tiZANidine (ZANAFLEX) 2 MG tablet Take 1 tablet (2 mg) by mouth 3 times daily as needed for muscle spasms (for back pain) 30 tablet 0    Glucosamine-Chondroitin (GLUCOSAMINE CHONDR COMPLEX PO) Take 2 capsules by mouth daily           SOCIAL HISTORY:  Social History     Socioeconomic History    Marital status: Single     Spouse name: Marcelo    Number of children: 1    Years of education: Not on file    Highest education level: Not on file   Occupational History    Occupation: PCA, Homemaking, [employer] for [employer]   Tobacco Use    Smoking status: Former     Packs/day: 0.00     Years: 10.00     Additional pack years: 0.00     Total pack years: 0.00     Types: Cigarettes     Quit date: 2018     Years since quittin.6    Smokeless tobacco: Never   Vaping Use    Vaping Use: Never used   Substance and Sexual Activity    Alcohol use: Yes     Comment: Rarely    Drug use: Yes     Types: Marijuana    Sexual activity: Yes     Partners: Male     Birth control/protection: Pill   Other Topics Concern    Parent/sibling w/ CABG, MI or angioplasty before 65F 55M? No   Social History Narrative    2022  Lives in [place] with S.GINA.Marcelo and daughterLissy.   No indoor cats/kittens.  No concerns about domestic violence.  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     Social Determinants of Health     Financial Resource Strain: Low Risk  (2024)    Financial Resource Strain     Within the past 12 months, have you or your family members you live with been unable to get utilities (heat, electricity) when it was really needed?: No   Food Insecurity: Low Risk  (2024)    Food Insecurity     Within the past 12 months, did you worry that your food would run out before you got money to buy more?: No     Within the past 12 months, did the food you bought just not last and you didn t have money to get more?: No   Transportation Needs: Low Risk  (2024)    Transportation Needs      "Within the past 12 months, has lack of transportation kept you from medical appointments, getting your medicines, non-medical meetings or appointments, work, or from getting things that you need?: No   Physical Activity: Not on file   Stress: Not on file   Social Connections: Not on file   Interpersonal Safety: Low Risk  (2/6/2024)    Interpersonal Safety     Do you feel physically and emotionally safe where you currently live?: Yes     Within the past 12 months, have you been hit, slapped, kicked or otherwise physically hurt by someone?: No     Within the past 12 months, have you been humiliated or emotionally abused in other ways by your partner or ex-partner?: No   Housing Stability: Low Risk  (2/18/2024)    Housing Stability     Do you have housing? : Yes     Are you worried about losing your housing?: No       FAMILY HISTORY:  Denies colon/panc/esophageal/other GI CA, no other Peter or other HPS-related Rosa Maria. No IBD/celiac, no other AI/liver/thyroid disease.    Family History   Problem Relation Age of Onset    Diabetes Mother     Thyroid Disease Mother     Other - See Comments Mother         B12 deficiancy    Hyperlipidemia Mother     Asthma Mother     No Known Problems Father     No Known Problems Sister     No Known Problems Brother     Skin Cancer Maternal Grandmother     Cancer Maternal Grandmother         skin    Thyroid Disease Maternal Grandmother     Other Cancer Maternal Grandmother         Skin    Emphysema Maternal Grandfather     Hypertension Paternal Grandmother     Thyroid Disease Paternal Grandmother     Emphysema Paternal Grandfather     No Known Problems Daughter     Cancer Maternal Aunt         stomach    Cancer Maternal Uncle         stomach    Lymphoma Paternal Aunt         intravascular    ALS Paternal Uncle        PHYSICAL EXAMINATION:  Vitals reviewed  /82 (BP Location: Right arm, Patient Position: Sitting, Cuff Size: Adult Large)   Pulse 80   Ht 1.683 m (5' 6.26\")   Wt 88.9 kg " (196 lb)   LMP  (LMP Unknown)   Breastfeeding No   BMI 31.39 kg/m      General: Patient appears well in no acute distress.    Skin: No visualized rash or lesions on visualized skin  HEENT:    EOMI, no erythema, sclera icterus or discharge noted.  Mouth mucosa intact, pink, moist  No cervical or supraclavicular lymphadenopathy. Thyroid gland not enlarged.  Resp: breathing comfortably without accessory muscle usage, speaking in full sentences, no cough. Lung sounds clear  Card: Regular and rhythmic S1 and S2. No gallop or rub. No murmur.  No LE edema.  Abdomen: Active bowel sounds X 4 quadrants. Soft to palpation.  No guarding or rebound tenderness. Gaona's sign negative.  MSK: Appears to have normal range of motion based on visualized movements  Neurologic: No apparent tremors, facial movements symmetric  Psych: affect normal, alert and oriented      ASSESSMENT/PLAN:    ICD-10-CM    1. Acute erosive gastritis  K29.00 Adult GI Clinic Follow-Up Order (Blank)      2. Duodenitis determined by biopsy  K29.80 Adult GI  Referral - Consult Only     HLA DQB1 for Celiac Disease      3. Anal fissure  K60.2       4. Positive autoantibody screening for celiac disease  R76.8 HLA DQB1 for Celiac Disease     Adult GI Clinic Follow-Up Order (Blank)      5. High grade dysplasia in colonic adenoma  D12.6 Adult GI Clinic Follow-Up Order (Blank)     Colonoscopy Screening  Referral    f/up colonoscopy in 6 months (Sept.2024)      6. NSAID long-term use  Z79.1 tiZANidine (ZANAFLEX) 2 MG tablet      7. Chronic midline low back pain without sciatica  M54.50 tiZANidine (ZANAFLEX) 2 MG tablet    G89.29          43 year old female  presented to GI clinic for a follow up on suspected celiac disease on EGD and duodenal biopsy. Patient stated that she requested to have upper and lower Gi endoscopy due to family hx of stomach cancer (in maternal uncle and aunt) and small hematochezia with rectal discomfort. Patient underwent  EGD and colonoscopy on 1/9/24. Found to have duodenal mucosa changes suggestive celiac disease. Noted mild elevation in TTG IgA. Patient is not convinced that she has celiac disease as she is asymptomatic. Decided to try gluten free diet but said that she sees no difference in her health status. HLA DQB1 for celiac disease was ordered. Patient was educated that if the test comes back positive, she definitely has celiac disease. We briefly dicussed gluten free diet, family testing, risk for hyposplenia and lymphoma, screening for vitamin deficiency and bone density scan.  - Noted erosive gastritis and peptic duodenopathy on EGD. Patient admits to taking NSAIDs regularly for low back pain and joint pain. Stated that they are getting house ready for sale and packing to move to [facility]. Interestingly, she denies any upper GI symptoms despite abnormal EGD findings. Asking why omeprazole and famotidine were prescribed and if it would be safe to take them. Was told by pharmacy that omeprazole can cause malabsorption and deficiency of vitamins and minerals. Educated on safety and indication of omeprazole. Recommended to take 40 mg 30-60 min before breakfast for 4 weeks. OK to hold famotidine. Strongly advised to abstain NSAIDs. Recommended to take tylenol. Prescribed a trial of tizanidine. Advised to establish care with a PCP at her new residence.  - Noted  that 16 mm tubular adenoma was founds in sigmoid colon and resected. Pathology came back positive for high grade dysplasia. Educated on findings. Patient is a former smoker, quit 5 years ago. No family hx of colon cancer. Suggested to have a follow up study in 6 months. If normal colonoscopy, next follow up study should occurs in 3-5 years.  - I appreciate Dr. Mock's assistance with pt's care, who pleasantly agreed to see the patient later this week and to schedule Botox injection due to unhealed anal fissure.  -Briefly answered the patient's questions about  potential side effects of Wegovy.  Patient verbalized understanding and appreciation of care provided. Stated that all of the questions were answered to her/his satisfaction.  RTC in 6 months (patient can return to [facility] or to establish care with GI provider in [place])    Thank you for this consultation. It was a pleasure to participate in the care of this patient; please contact us with any further questions.    ADARSH Gross, FNP-C  Division of Gastroenterology, Hepatology, and Nutrition  [facility], [place], [place]    This note was created with Dragon voice recognition software, and while reviewed for accuracy, inadvertent minor typographic errors may occur. Please contact the provider if you have any questions.

## 2024-03-01 NOTE — PATIENT INSTRUCTIONS
"It was a pleasure taking care of you today.  I've included a brief summary of our discussion and care plan from today's visit below.  Please review this information with your primary care provider.  ______________________________________________________________________    My recommendations are summarized as follows:    Stay on gluten rich diet for 2 weeks and then, have your blood drawn for celiac disease.    2. Plan to have a follow up colonoscopy in 6 weeks for an adenoma with high grade dysplasia.    3. Take omeprazole 40 mg every day 30-60 min before breakfast and other pills.    4. Avoid NSAIDs. Take a muscle relaxer instead.    Return to GI Clinic in 6 months to review your progress.    ______________________________________________________________________    CELIAC DISEASE OVERVIEW  Celiac disease is a condition in which the immune system responds abnormally to a protein called gluten, which then leads to damage to the lining of the small intestine. Gluten is found in wheat, rye, barley, and a multitude of prepared foods. The small intestine is responsible for absorbing food and nutrients. Thus, damage to the lining of the small intestines can lead to difficulty absorbing important nutrients; this problem is referred to as \"malabsorption.\" Although celiac disease cannot be cured, avoiding gluten usually stops the damage to the intestinal lining and the malabsorption that results. Celiac disease can occur in people of any age, sex, and race.    CELIAC DISEASE SYMPTOMS  The symptoms of celiac disease vary from one person to another. In its mildest form, there may be no symptoms whatsoever. However, even if you have no symptoms, you may not be absorbing nutrients adequately; this can be detected with blood tests.  Some people with celiac disease have gastrointestinal symptoms, which commonly include:  ?Abdominal pain  ?Diarrhea  ?Bowel movements that are oily and float  ?Weight loss  ?Feeling bloated, or too " "full all the time  ?Lack of appetite  ?Bad gas  Certain other medical conditions are more common in people with celiac disease, including:  ?Osteopenia or osteoporosis (weakening of the bones)  ?Iron deficiency anemia (low blood count due to lack of iron)  ?Diabetes mellitus (type I or so-called juvenile onset diabetes mellitus)  ?Thyroid problems (usually hypothyroidism, an underactive thyroid)   ? A skin disease called dermatitis herpetiformis   ?Nervous system disorders  ?Liver disease    CELIAC DISEASE DIAGNOSIS  Celiac disease can be difficult to diagnose because it can cause so many different symptoms, many of which can also be caused by other conditions. Fortunately, testing is available that can easily distinguish untreated celiac disease from other disorders.  Blood tests -- A blood test can determine the blood level of antibodies (specific proteins) that become elevated in people with celiac disease. Over 95 percent of people with untreated celiac disease have elevated antibody levels (called IgA tissue transglutaminase, or IgA tTG), while these levels are rarely elevated in those without celiac disease. Levels of other antibodies (called IgA or IgG deamidated gliadin peptide) are also usually abnormally high in untreated celiac disease.  Before having these tests, it is important to continue eating foods that contain gluten. Avoiding or eliminating gluten could cause the antibody levels to fall to normal, delaying the diagnosis.  Small intestine biopsy -- If your blood test is positive, the diagnosis of celiac disease must be confirmed with a biopsy; this involves taking a sample of the intestinal lining and examining it under a microscope. The sample is usually collected during an upper endoscopy, a test that involves swallowing a small flexible instrument with a camera.   Normally, the lining of the small intestine has small finger-like structures that can be seen under a microscope, called \"villi.\" " "Villi allow the small intestine to absorb nutrients from food. When a person with celiac disease ingests gluten, the villi become flattened, and the body cannot properly absorb nutrients. Once the person removes gluten from their diet, the villi can resume a normal growth pattern. More than 70 percent of people with celiac disease begin to feel better within two weeks after stopping gluten.  \"Potential\" celiac disease -- People with a positive blood test but a normal small intestine biopsy are considered to have potential celiac disease. People with potential celiac disease are not usually advised to eat a gluten-free diet. However, ongoing monitoring (with blood tests) is recommended, and a repeat biopsy may be needed if you develop symptoms. Biopsies should be taken from several areas in the intestine.  \"Silent\" celiac disease -- If you have a positive blood test for celiac disease and an abnormal small intestine biopsy, but you have no other symptoms of celiac disease, you are said to have \"silent\" celiac disease. It is not clear if adults with silent celiac disease should eat a gluten-free diet, as there is not sufficient evidence that this is beneficial. Blood tests for malabsorption are recommended, and a gluten-free diet may be needed if you have evidence of malabsorption. A temporary trial of the gluten-free diet for several months may be worthwhile to determine whether subtle, unrecognized celiac symptoms (eg, low energy or mild intestinal symptoms) improve.  Testing for malabsorption -- You should be tested for nutritional deficiencies if your blood test or biopsy indicates celiac disease. Common tests include measurement of iron, folic acid, or vitamin B12, and vitamin D. You may have other tests if you have signs of mineral or fat deficiency, such as changes in taste or smell, poor appetite, changes in your nails, hair, or skin, or diarrhea.  Other tests -- Other standard tests include a CBC (complete " blood count), lipid levels (total cholesterol, HDL, LDL, and triglycerides), and thyroid levels. Once your celiac antibody levels return to normal, you should have a repeat test once per year.  Many clinicians recommend a test for bone loss 12 months after beginning a gluten-free diet. One method involves using a bone density (DEXA) scan to measures your bone density. The test is not painful and is similar to having an x-ray. If you have significant bone loss, you may need calcium and vitamin D supplements, an exercise program, and possibly a medicine to stop bone loss and encourage new bone growth.    CELIAC DISEASE COMPLICATIONS   Nonresponsive celiac disease -- Approximately 10 percent of people with celiac disease experience ongoing symptoms despite adhering to a gluten-free diet. There are many causes, including other food intolerances such as fructose (or other fermentable carbohydrates) malabsorption, food allergies, bacterial overgrowth in the small intestine or conditions such as microscopic colitis, irritable bowel syndrome, pancreatic exocrine insufficiency, or refractory celiac disease. However, the most common cause is ongoing, often inadvertent, gluten ingestion. Thus, an essential first step in evaluating nonresponsive celiac disease is consultation with an experienced celiac dietitian.  Refractory celiac disease -- A small percentage of people develop intestinal symptoms that do not improve despite use of a strict gluten-free diet. In other cases, intestinal symptoms initially improve with dietary changes but then return.  People who have these problems may have refractory celiac disease. The cause of this problem is not known. Treatment involves medications that suppress the immune system's abnormal response (eg, steroids). Treatment is important because people with untreated celiac disease can develop anemia, bone loss, and other consequences of malabsorption.  Ulcerative jejunitis -- People with  refractory celiac disease who do not improve with steroids (glucocorticoids) may have a condition known as ulcerative jejunitis. This condition causes the small intestine to develop multiple ulcers that do not heal; other symptoms may include a lack of appetite, weight loss, abdominal pain, diarrhea, and fever. This condition can be difficult to treat. Treatment may require surgery to remove the ulcerated area.  Lymphoma -- Cancer of the intestinal lymph system (lymphoma) is an uncommon complication of celiac disease. Avoiding gluten can usually prevent this complication.  Skin conditions -- Celiac disease is associated with a number of skin disorders, of which dermatitis herpetiformis is the most common. Dermatitis herpetiformis is characterized by intensely itchy, raised, fluid-filled areas on the skin, usually located on the elbows, knees, buttocks, lower back, face, neck, trunk, and occasionally within the mouth.  The most bothersome symptoms are itching and burning. This feeling is quickly relieved when the blister ruptures. Scratching causes the area to rupture, dry up, and leave an area of darkened skin and scarring. The condition will improve after eliminating gluten from the diet, although it may take several weeks to see significant improvement.     CELIAC DISEASE TREATMENT   Gluten-free diet -- The cornerstone of treatment for celiac disease is complete elimination of gluten from the diet for life. Gluten is the group of proteins found in wheat, rye, and barley that are toxic to those with celiac disease. Gluten is not only contained in these most commonly consumed grains in the [place] world, but is also hidden as an ingredient in a large number of prepared foods as well as medications and supplements.  Maintaining a gluten-free diet can be a challenging task that may require major lifestyle adjustments. Strict gluten avoidance is recommended since even small amounts can aggravate the disease. It is  "important to avoid both eating gluten and being exposed to large amounts of flour particles in the air.  Get help from a dietitian -- An experienced celiac dietitian can help you to learn how to eat a gluten-free diet, what foods to avoid, and what foods to add for a nutritionally balanced diet. Your dietitian can tailor your gluten-free diet to your own food preferences, culture, lifestyle, calorie needs, and other medical conditions. They can also recommend gluten-free vitamins/minerals and other supplements, as needed.  Your celiac dietitian can also educate you on grocery shopping, reading labels, food preparation, dining out, and lifestyle resources. Avoiding cross contact (when gluten-free foods come in contact with gluten-containing ingredients or foods) is also very important. Your dietitian can teach you common sources of cross contact in the kitchen, such as crumbs on the counter or sharing butter, jelly, or other condiments with those who eat gluten. Excellent resources are also available from celiac medical centers, organizations, and support groups.   Fortunately, life on a gluten-free diet becomes increasingly easier each year due to the rising popularity of gluten-free foods among those with celiac disease, non-celiac gluten sensitivity, and wheat allergies. Excellent gluten-free substitute foods are widely available in supermarkets, health food stores, and online.  General tips  ?Avoid foods containing wheat, rye, barley, malt, rizo's yeast, oats (unless uncontaminated, labeled gluten-free oats), and yeast extract and autolyzed yeast extract (unless the source is identified as gluten-free). \"Malt\" means \"barley malt\" unless another grain source is named, such as \"corn malt.\"  ?According to US Food and Drug Administration (FDA) regulations issued in 2013, foods with \"gluten-free\" labeling must contain less than 20 parts per million (ppm) of gluten; this is considered a safe limit for people with " celiac disease.   Naturally gluten-free foods include rice, wild rice, corn, potato, and other grains and foods . However, these foods may be contaminated with wheat, barley, or rye. Choose labeled gluten-free versions of these products. Exceptions are fresh corn, fresh potatoes, plain rice and plain wild rice. These foods may not be labeled gluten-free but are still considered safe to eat. Unless they are labeled gluten-free, dried beans, lentils, and other legumes, such as chickpeas, are allowed by law to contain a certain percentage of foreign grain, including wheat, barley, and/or rye. Whether they are labeled gluten-free or not, sort through them carefully and rinse carefully under running water. Choose labeled gluten-free nuts and seeds when possible, particularly seasoned or dry roasted.  ?Typically, people who have just been diagnosed depend on low-fiber and low-nutrient grains and starches like rice, corn, and potatoes because they are familiar with them. Some foods that contain these products are fortified with extra nutrients, but most companies do not fortify their products. In addition, a low fiber diet can lead to constipation. Adding some of the whole gluten-free grains (quinoa, amaranth, teff, buckwheat, sorghum, and millet) can help improve bowel movements and also increase your nutrients. Make sure they are labeled gluten-free. Be sure to also include plenty of fruits, vegetables, nuts and seeds, legumes and beans (navy, balbuena, black, cannellini, etc) in your diet. Add high-fiber foods slowly to give your stomach and intestines time to adjust. When eating a diet rich in fiber, it is important to drink water throughout the day to prevent constipation.  ?There is some concern over the potential for increased arsenic and other heavy metal exposure in people with celiac disease. Arsenic can be found in rice, and both children and adults with celiac disease consume a high intake of rice and rice-based  "products. We need more research to understand how this exposure may present a long-term health risk to those with celiac disease. In the meantime, it is another reason to rely less on rice and more on other labeled gluten-free grains. When you prepare rice, rinse it before cooking. Cook it in six times more water than rice (as when you cook pasta), and drain the excess water after the rice has finished cooking to remove about 50 percent of the arsenic.   ?If a food is regulated by the FDA and is not labeled gluten-free (such as prepared foods and condiments), read the ingredients list and \"contains\" statement carefully. The word \"wheat\" will be included if the product is FDA regulated and contains wheat protein. If you do not see any of the following words on the label of an FDA-regulated food (wheat, rye, barley, malt, rizo's yeast, oats, yeast extract, and autolyzed yeast extract) then the product is unlikely to include any gluten-containing ingredients. However, the Food Allergen Consumer Protection Act pertains to ingredients only. It does not cover wheat protein that may be in a product unintentionally due to cross-contact.  ?Unlike the FDA, there is no official standard for gluten-free labeling of [place] Department of Agriculture (USDA) products. However, if an USDA regulated product has a gluten-free label, it should meet the same standard as FDA products. See USDA labeling information in the resources listed below.  ?Distilled alcoholic beverages and vinegars, as well as wine, are gluten-free unless gluten-containing flavorings are added after production. However, malt beverages, including beer, are not considered gluten-free. There are specially produced beers that do not use malted barley that are labeled gluten-free and can be consumed on a gluten-free diet. Avoid beer that is labeled as gluten-removed or gluten-reduced. Please note that malt vinegar is not gluten-free.  ?You may not tolerate " "dairy products initially while your intestines are healing. If you tolerated lactose before your diagnosis, you may be able to tolerate it again after the intestine heals. In the meantime, choose lactose-reduced or lactose-free products if your symptoms are worsened by dairy products. Choose labeled gluten-free, dairy-free alternatives, such as rice, soy, or nut (almond, hazelnut) beverages that are enriched with calcium and vitamin D. Keep in mind that gluten-free rice and nut milks have minimal protein per serving compared with cow's or soy milk. Gluten-free lactase enzyme supplements are also available, which may help you to tolerate foods that contain lactose.  ?Discuss your need for calcium and vitamin D supplements with your health care provider or dietitian.  ?A small percentage of people with celiac disease cannot tolerate gluten-free oats for several reasons. Pure oats are naturally gluten free, but most are grown in or near barley, rye, and wheat. They can also be processed on the same equipment which contaminates them and makes them unsafe for someone with celiac disease to eat. At this time, there are two methods to produce gluten-free oats. Manufacturers of \"purity protocol\" oats follow several steps to avoid cross contact, such as separate equipment and facilities that do not share or process gluten-containing grains. Other companies use mechanical and optical sorting methods to \"clean\" oats. Their special equipment sorts oats from gluten-containing grains based on the differences in size, shape, and other factors. Regardless of the type of gluten-free oats, proper, rigorous testing by the company is the most important step to avoid cross-contact with gluten.  All oats consumed, at the very least, must be labeled gluten-free or certified gluten-free. If you choose to eat gluten-free oats, first talk to your doctor who can check your tTG-IgA level and monitor any symptoms. Limit your intake of " gluten-free oats to no more than 50 grams (approximately 1/2 cup dry rolled oats or 1/4 cup dry steel-cut oats) per day. If tolerated, you may be able to discuss eating more than 1/2 cup per day under the supervision of your doctor. People with severe or hard to manage disease should avoid even gluten-free oats.    Is strict gluten avoidance really necessary? -- People who have been diagnosed with celiac disease but have no symptoms often find it difficult to follow a strict gluten-free diet. Indeed, some health care providers have questioned the need for a gluten-free diet in this group. However, certain factors support a gluten-free diet, even in those without symptoms:  ?Strictly following a gluten-free diet sometimes helps you to feel more energetic and have an improved sense of health and wellbeing.  ?Some people with celiac disease have vitamin or nutrient deficiencies that do not cause them to feel ill, such as anemia due to iron deficiency or bone loss due to vitamin D deficiency. However, these deficiencies can cause problems over the long term.  ?Untreated celiac disease can increase the risk of developing certain types of gastrointestinal cancer. This risk can be reduced by eating a gluten-free diet.  Ongoing monitoring -- Once a person has been on a gluten-free diet for at least four to six weeks, blood tests can be used to confirm that antibody levels are declining  The health care provider will also monitor the person for any new or worsening symptoms.    IMPLICATIONS FOR THE FAMILY  Eliminating gluten requires a major lifestyle change for you as well as your family. However, with time and practice, it will be easier to know which foods, medications, supplements, and oral care products contain gluten and what alternatives are available. Although eating out can be challenging at first, restaurants have become increasingly interested in serving people with celiac disease by offering a gluten-free menu or  ingredient substitutions.  Families also need to be aware of their increased risk of celiac disease, as there is a genetic component. If you have been diagnosed with celiac disease, your first-degree relatives (parents, siblings, children) should consider being tested as well, especially if anyone has signs or symptoms of the condition.      ______________________________________________________________________    Who do I call with any questions after my visit?  Please be in touch if there are any further questions that arise following today's visit.  There are multiple ways to contact your gastroenterology care team.      During business hours, you may reach a Gastroenterology nurse at [phone], option 3.     To schedule or reschedule an appointment, please call [phone].   To schedule your imaging studies (CT, MRI, ultrasound)  call [phone] (or toll-free # [phone])  To schedule your lab work at [facility], please call [phone]    You can always send a secure message through [employer]  [employer] messages are answered by your nurse or doctor typically within 24 hours.  Please allow extra time on weekends and holidays.      For urgent/emergent questions after business hours, you may reach the on-call GI Fellow by contacting the [facility]  at [phone].    In order for your refill to be processed in a timely fashion, it is your responsibility to ensure you follow the recommendations from your provider regarding your laboratory studies and follow up appointments.       How will I get the results of any tests ordered?    You will receive all of your results.  If you have signed up for [employer], any tests ordered at your visit will be available to you after your physician reviews them.  Typically this takes 1-2 weeks.  If there are urgent results that require a change in your care plan, your physician or nurse will call you to discuss the next steps.    What is [employer]?  [employer] is a secure way for you to access all of your healthcare records from the [facility].  It is a web based computer program, so you can sign on to it from any location.  It also allows you to send secure messages to your care team.  I recommend signing up for [employer] access if you have not already done so and are comfortable with using a computer.    How to I schedule a follow-up visit?  If you did not schedule a follow-up visit today, please call [phone] to schedule a follow-up office visit.      Sincerely,  CONSUELO Gross,  [facility],  Division of Gastroenterology   ([facility])

## 2024-03-04 ENCOUNTER — OFFICE VISIT (OUTPATIENT)
Dept: GASTROENTEROLOGY | Facility: CLINIC | Age: 44
End: 2024-03-04
Payer: COMMERCIAL

## 2024-03-04 VITALS
BODY MASS INDEX: 31.5 KG/M2 | HEART RATE: 80 BPM | WEIGHT: 196 LBS | SYSTOLIC BLOOD PRESSURE: 124 MMHG | DIASTOLIC BLOOD PRESSURE: 82 MMHG | HEIGHT: 66 IN

## 2024-03-04 DIAGNOSIS — G89.29 CHRONIC MIDLINE LOW BACK PAIN WITHOUT SCIATICA: ICD-10-CM

## 2024-03-04 DIAGNOSIS — K29.00 ACUTE EROSIVE GASTRITIS: Primary | ICD-10-CM

## 2024-03-04 DIAGNOSIS — D12.6 HIGH GRADE DYSPLASIA IN COLONIC ADENOMA: ICD-10-CM

## 2024-03-04 DIAGNOSIS — K60.2 ANAL FISSURE: ICD-10-CM

## 2024-03-04 DIAGNOSIS — R76.8 POSITIVE AUTOANTIBODY SCREENING FOR CELIAC DISEASE: ICD-10-CM

## 2024-03-04 DIAGNOSIS — Z79.1 NSAID LONG-TERM USE: ICD-10-CM

## 2024-03-04 DIAGNOSIS — M54.50 CHRONIC MIDLINE LOW BACK PAIN WITHOUT SCIATICA: ICD-10-CM

## 2024-03-04 DIAGNOSIS — K29.80 DUODENITIS DETERMINED BY BIOPSY: ICD-10-CM

## 2024-03-04 PROCEDURE — 99203 OFFICE O/P NEW LOW 30 MIN: CPT | Performed by: NURSE PRACTITIONER

## 2024-03-04 RX ORDER — MULTIVIT-MIN/IRON/FOLIC ACID/K 18-600-40
1 CAPSULE ORAL DAILY
COMMUNITY
Start: 2023-04-29

## 2024-03-04 RX ORDER — TIZANIDINE 2 MG/1
2 TABLET ORAL 3 TIMES DAILY PRN
Qty: 30 TABLET | Refills: 0 | Status: SHIPPED | OUTPATIENT
Start: 2024-03-04

## 2024-03-04 ASSESSMENT — PAIN SCALES - GENERAL: PAINLEVEL: NO PAIN (0)

## 2024-03-04 NOTE — LETTER
3/4/2024         RE: Estefania Arguello  [address] [facility] [address]        Dear Colleague,    Thank you for referring your patient, Estefania Arguello, to the [facility]. Please see a copy of my visit note below.    Gastroenterology CLINIC VISIT, NEW PATIENT    CC/REFERRING PROVIDER: Becki Rodriguez  REASON FOR CONSULTATION: gluten intolerance    HPI: 43 year old female with PMH of hemorrhoids, hypothyroidism, and chronic low back pain, was referred  to GI clinic for consult on suspected diagnosis of celiac disease. Patient stated that she requested EGD due to family hx of stomach cancer in maternal uncle and aunt. Accidental findings of erosive gastritis and duodenopathy. Pathology report was suggestive for prominent villous blunting and intraepithelial lymphocytosis of small bowel mucosa biopsy. TTG IgA was slightly elevated. Patient is questioning if she has celiac disease. She denies nausea, bloating, diarrhea, indigestion, acid reflux, skin rashes, joint pain, or abdominal pain. Denies family hx of celiac disease. She started gluten free diet 3 weeks ago but did not notice any difference in her health status.    Her main concern today is rectal discomfort (itching, pain) and small amount of red blood on wipes after defecation. She saw Dr. Mock and was diagnosed with anal fissure. Said that nifedipine cream is helping, but she feels like her fissure re-opened last week, when she had viral infection with one day of diarrhea. Asking for a referral to Botox injection.  She is also wondering about abnormal polyp of colon on colonoscopy. Denies problems with constipation. Having 1-2 formed stools a day. No weight loss. No black stools. No family Hx of colon cancer.    PREVIOUS ENDOSCOPY:  1/9/2024 EGD  Findings:       The examined esophagus was normal.        The Z-line was regular and was found 37 cm from the incisors.        Localized mild inflammation characterized by linear  erosions was found        in the prepyloric region of the stomach. Biopsies were taken with a cold        forceps for Helicobacter pylori testing.        Localized mild inflammation characterized by linear erosions was found        in the duodenal bulb. Biopsies for histology were taken with a cold        forceps for evaluation of celiac disease.       1/9/2024 Colonoscopy  Findings:       An anal fissure was found on perianal exam.        A 5 mm polyp was found in the cecum. The polyp was sessile. The polyp        was removed with a cold snare. Resection and retrieval were complete.        A 16 mm polyp was found in the sigmoid colon. The polyp was        semi-pedunculated. The polyp was removed with a cold snare. Resection        and retrieval were complete.        No additional abnormalities were found on retroflexion.       Final Diagnosis   A(1).  Duodenum, biopsy:  -Small bowel mucosa with prominent villous blunting and intraepithelial lymphocytosis (see comment).  -Focal features of peptic duodenitis.  -Normal component of lamina propria plasma cells identified.  -Negative for luminal organisms.  -Negative for granulomas.  -Negative for dysplasia or malignancy.     B(2). Stomach, antrum, biopsy  -Antral type gastric mucosa with mild chronic inflammation.  -Negative for H. Pylori organisms on immunohistochemical stains.  - Negative for intestinal metaplasia.   -Negative for dysplasia or malignancy      C(3).   Colon, Cecum, polyp, polypectomy:  -Acellular debris and vegetable matter only.  -Insufficient for further diagnosis.      D(4).   Colon, Sigmoid, polyp, polypectomy:  -Fragments of tubular adenoma with focal high-grade dysplasia  -Negative for high-grade dysplasia and malignancy.          Comment     Part A - Although not entirely specific, in the appropriate clinical context and laboratory findings, the histologic pattern is  highly suggestive of celiac disease (marsh level 3b).  Other differential  "diagnostic possibilities include autoimmune disorders, food protein allergies, Crohn's disease, infections, among others.  Additional reactive features consistent with peptic duodenitis are present.         PERTINENT STUDIES Reviewed in EMR      ROS: 10pt ROS performed and otherwise negative.    PAST MEDICAL HISTORY:  Past Medical History:   Diagnosis Date     Arthritis 2023    I think im starting to notice arthritis in my hands     Back pain      Hypothyroidism      Seasonal allergies        PREVIOUS ABDOMINAL/GYNECOLOGIC SURGERIES:  Past Surgical History:   Procedure Laterality Date     BIOPSY  ?    I ve had a couple of tumors biopsied over the years. Also had some tissue biopsied during my last endoscopy     COLONOSCOPY N/A 01/09/2024    Procedure: COLONOSCOPY, FLEXIBLE, WITH LESION REMOVAL USING SNARE;  Surgeon: Robi Soto MD;  Location:  GI     DILATION AND CURETTAGE SUCTION N/A 03/08/2022    Procedure: DILATION AND CURETTAGE, UTERUS, USING SUCTION;  Surgeon: Bairon Thomas MD;  Location: [facility] OR     ESOPHAGOSCOPY, GASTROSCOPY, DUODENOSCOPY (EGD), COMBINED N/A 01/09/2024    Procedure: ESOPHAGOGASTRODUODENOSCOPY, WITH BIOPSY;  Surgeon: Robi Soto MD;  Location:  GI     EYE SURGERY      \"benign tumor above right eye\"     MOUTH SURGERY      \"benign tumor under molar removed\"         PERTINENT MEDICATIONS:  Current Outpatient Medications   Medication Sig Dispense Refill     COMPOUNDED NON-CONTROLLED SUBSTANCE (CMPD RX) - PHARMACY TO MIX COMPOUNDED MEDICATION Please Compound 0.3% Nifedipine in 1.5% Lidocaine ointment  Apply 1 g topically to affected rectal area BID 60 g 3     desogestrel-ethinyl estradiol (APRI) 0.15-30 MG-MCG tablet Take 1 tablet by mouth daily Active pills continuously, skip placebos 112 tablet 0     famotidine (PEPCID) 20 MG tablet Take 1 tablet (20 mg) by mouth 2 times daily 180 tablet 0     levothyroxine (SYNTHROID/LEVOTHROID) 100 MCG tablet Take 1 tablet (100 mcg) by " mouth daily 90 tablet 3     Multiple Vitamins-Minerals (MULTI FOR HER) CAPS Take 1 capsule by mouth daily       omeprazole (PRILOSEC) 40 MG DR capsule Take 1 capsule (40 mg) by mouth daily 90 capsule 3     Semaglutide-Weight Management (WEGOVY) 0.25 MG/0.5ML pen Inject 0.5 mg Subcutaneous once a week for 4 doses 4 mL 0     tiZANidine (ZANAFLEX) 2 MG tablet Take 1 tablet (2 mg) by mouth 3 times daily as needed for muscle spasms (for back pain) 30 tablet 0     Glucosamine-Chondroitin (GLUCOSAMINE CHONDR COMPLEX PO) Take 2 capsules by mouth daily           SOCIAL HISTORY:  Social History     Socioeconomic History     Marital status: Single     Spouse name: Marcelo     Number of children: 1     Years of education: Not on file     Highest education level: Not on file   Occupational History     Occupation: PCA, Homemaking, [employer] for [employer]   Tobacco Use     Smoking status: Former     Packs/day: 0.00     Years: 10.00     Additional pack years: 0.00     Total pack years: 0.00     Types: Cigarettes     Quit date: 2018     Years since quittin.6     Smokeless tobacco: Never   Vaping Use     Vaping Use: Never used   Substance and Sexual Activity     Alcohol use: Yes     Comment: Rarely     Drug use: Yes     Types: Marijuana     Sexual activity: Yes     Partners: Male     Birth control/protection: Pill   Other Topics Concern     Parent/sibling w/ CABG, MI or angioplasty before 65F 55M? No   Social History Narrative    2022  Lives in [place] with S.GINA.Marcelo and daughter, Lissy.   No indoor cats/kittens.  No concerns about domestic violence.  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     Social Determinants of Health     Financial Resource Strain: Low Risk  (2024)    Financial Resource Strain      Within the past 12 months, have you or your family members you live with been unable to get utilities (heat, electricity) when it was really needed?: No   Food Insecurity: Low Risk  (2024)    Food Insecurity      Within  the past 12 months, did you worry that your food would run out before you got money to buy more?: No      Within the past 12 months, did the food you bought just not last and you didn t have money to get more?: No   Transportation Needs: Low Risk  (2/18/2024)    Transportation Needs      Within the past 12 months, has lack of transportation kept you from medical appointments, getting your medicines, non-medical meetings or appointments, work, or from getting things that you need?: No   Physical Activity: Not on file   Stress: Not on file   Social Connections: Not on file   Interpersonal Safety: Low Risk  (2/6/2024)    Interpersonal Safety      Do you feel physically and emotionally safe where you currently live?: Yes      Within the past 12 months, have you been hit, slapped, kicked or otherwise physically hurt by someone?: No      Within the past 12 months, have you been humiliated or emotionally abused in other ways by your partner or ex-partner?: No   Housing Stability: Low Risk  (2/18/2024)    Housing Stability      Do you have housing? : Yes      Are you worried about losing your housing?: No       FAMILY HISTORY:  Denies colon/panc/esophageal/other GI CA, no other Peter or other HPS-related Rosa Maria. No IBD/celiac, no other AI/liver/thyroid disease.    Family History   Problem Relation Age of Onset     Diabetes Mother      Thyroid Disease Mother      Other - See Comments Mother         B12 deficiancy     Hyperlipidemia Mother      Asthma Mother      No Known Problems Father      No Known Problems Sister      No Known Problems Brother      Skin Cancer Maternal Grandmother      Cancer Maternal Grandmother         skin     Thyroid Disease Maternal Grandmother      Other Cancer Maternal Grandmother         Skin     Emphysema Maternal Grandfather      Hypertension Paternal Grandmother      Thyroid Disease Paternal Grandmother      Emphysema Paternal Grandfather      No Known Problems Daughter      Cancer Maternal Aunt   "       stomach     Cancer Maternal Uncle         stomach     Lymphoma Paternal Aunt         intravascular     ALS Paternal Uncle        PHYSICAL EXAMINATION:  Vitals reviewed  /82 (BP Location: Right arm, Patient Position: Sitting, Cuff Size: Adult Large)   Pulse 80   Ht 1.683 m (5' 6.26\")   Wt 88.9 kg (196 lb)   LMP  (LMP Unknown)   Breastfeeding No   BMI 31.39 kg/m      General: Patient appears well in no acute distress.    Skin: No visualized rash or lesions on visualized skin  HEENT:    EOMI, no erythema, sclera icterus or discharge noted.  Mouth mucosa intact, pink, moist  No cervical or supraclavicular lymphadenopathy. Thyroid gland not enlarged.  Resp: breathing comfortably without accessory muscle usage, speaking in full sentences, no cough. Lung sounds clear  Card: Regular and rhythmic S1 and S2. No gallop or rub. No murmur.  No LE edema.  Abdomen: Active bowel sounds X 4 quadrants. Soft to palpation.  No guarding or rebound tenderness. Gaona's sign negative.  MSK: Appears to have normal range of motion based on visualized movements  Neurologic: No apparent tremors, facial movements symmetric  Psych: affect normal, alert and oriented      ASSESSMENT/PLAN:    ICD-10-CM    1. Acute erosive gastritis  K29.00 Adult GI Clinic Follow-Up Order (Blank)      2. Duodenitis determined by biopsy  K29.80 Adult GI  Referral - Consult Only     HLA DQB1 for Celiac Disease      3. Anal fissure  K60.2       4. Positive autoantibody screening for celiac disease  R76.8 HLA DQB1 for Celiac Disease     Adult GI Clinic Follow-Up Order (Blank)      5. High grade dysplasia in colonic adenoma  D12.6 Adult GI Clinic Follow-Up Order (Blank)     Colonoscopy Screening  Referral    f/up colonoscopy in 6 months (Sept.2024)      6. NSAID long-term use  Z79.1 tiZANidine (ZANAFLEX) 2 MG tablet      7. Chronic midline low back pain without sciatica  M54.50 tiZANidine (ZANAFLEX) 2 MG tablet    G89.29          43 " year old female  presented to GI clinic for a follow up on suspected celiac disease on EGD and duodenal biopsy. Patient stated that she requested to have upper and lower Gi endoscopy due to family hx of stomach cancer (in maternal uncle and aunt) and small hematochezia with rectal discomfort. Patient underwent EGD and colonoscopy on 1/9/24. Found to have duodenal mucosa changes suggestive celiac disease. Noted mild elevation in TTG IgA. Patient is not convinced that she has celiac disease as she is asymptomatic. Decided to try gluten free diet but said that she sees no difference in her health status. HLA DQB1 for celiac disease was ordered. Patient was educated that if the test comes back positive, she definitely has celiac disease. We briefly dicussed gluten free diet, family testing, risk for hyposplenia and lymphoma, screening for vitamin deficiency and bone density scan.  - Noted erosive gastritis and peptic duodenopathy on EGD. Patient admits to taking NSAIDs regularly for low back pain and joint pain. Stated that they are getting house ready for sale and packing to move to [facility]. Interestingly, she denies any upper GI symptoms despite abnormal EGD findings. Asking why omeprazole and famotidine were prescribed and if it would be safe to take them. Was told by pharmacy that omeprazole can cause malabsorption and deficiency of vitamins and minerals. Educated on safety and indication of omeprazole. Recommended to take 40 mg 30-60 min before breakfast for 4 weeks. OK to hold famotidine. Strongly advised to abstain NSAIDs. Recommended to take tylenol. Prescribed a trial of tizanidine. Advised to establish care with a PCP at her new residence.  - Noted  that 16 mm tubular adenoma was founds in sigmoid colon and resected. Pathology came back positive for high grade dysplasia. Educated on findings. Patient is a former smoker, quit 5 years ago. No family hx of colon cancer. Suggested to have a follow up study in  6 months. If normal colonoscopy, next follow up study should occurs in 3-5 years.  - I appreciate Dr. Mock's assistance with pt's care, who pleasantly agreed to see the patient later this week and to schedule Botox injection due to unhealed anal fissure.  -Briefly answered the patient's questions about potential side effects of Wegovy.  Patient verbalized understanding and appreciation of care provided. Stated that all of the questions were answered to her/his satisfaction.  RTC in 6 months (patient can return to [facility] or to establish care with GI provider in [place])    Thank you for this consultation. It was a pleasure to participate in the care of this patient; please contact us with any further questions.    ADARSH Gross, FNP-C  Division of Gastroenterology, Hepatology, and Nutrition  [facility], [place], [place]    This note was created with Dragon voice recognition software, and while reviewed for accuracy, inadvertent minor typographic errors may occur. Please contact the provider if you have any questions.       Again, thank you for allowing me to participate in the care of your patient.        Sincerely,        ADARSH GROSS CNP

## 2024-03-07 ENCOUNTER — TELEPHONE (OUTPATIENT)
Dept: SURGERY | Facility: CLINIC | Age: 44
End: 2024-03-07

## 2024-03-07 ENCOUNTER — OFFICE VISIT (OUTPATIENT)
Dept: SURGERY | Facility: CLINIC | Age: 44
End: 2024-03-07
Payer: COMMERCIAL

## 2024-03-07 VITALS
WEIGHT: 196 LBS | BODY MASS INDEX: 31.5 KG/M2 | TEMPERATURE: 96.4 F | HEIGHT: 66 IN | DIASTOLIC BLOOD PRESSURE: 78 MMHG | SYSTOLIC BLOOD PRESSURE: 116 MMHG

## 2024-03-07 DIAGNOSIS — K60.2 ANAL FISSURE: Primary | ICD-10-CM

## 2024-03-07 PROCEDURE — 99213 OFFICE O/P EST LOW 20 MIN: CPT | Performed by: SPECIALIST

## 2024-03-07 ASSESSMENT — PAIN SCALES - GENERAL: PAINLEVEL: NO PAIN (0)

## 2024-03-07 NOTE — LETTER
3/7/2024         RE: Estefania Arguello  [address] [facility] [address]        Dear Colleague,    Thank you for referring your patient, Estefania Arguello, to the [facility]. Please see a copy of my visit note below.    Follow-up for anal fissure and colonoscopy.      Subjective:  Patient is known to me for an anal fissure.  Was treated with nifedipine.  Also has had a recent EGD and colonoscopy.  On colonoscopy she was found of safe found to have a sigmoid polyp with fragments of high-grade dysplasia.  She had a bout of diarrhea and feels the fissure returned.  The fissure was noted on colonoscopy back in January.  She now presents for follow-up.      Objective:  B/P: 116/78, T: 96.4, P: Data Unavailable, R: Data Unavailable  Rectal: Deferred    Assessment/plan:  This is a 43 lady with an anal fissure that was found on colonoscopy back in January.  She was on nifedipine prior to that without healing.  I discussed the options of Botox as a neck step and she is in agreement with that after discussion the patient plan at this time is taken the OR for an exam under anesthesia with Botox injection.  The procedure, risks, benefits, and alternatives were discussed and the patient agrees to proceed.    Robi Soto MD, FACS      Again, thank you for allowing me to participate in the care of your patient.        Sincerely,        Robi Soto MD

## 2024-03-07 NOTE — PROGRESS NOTES
Follow-up for anal fissure and colonoscopy.      Subjective:  Patient is known to me for an anal fissure.  Was treated with nifedipine.  Also has had a recent EGD and colonoscopy.  On colonoscopy she was found of safe found to have a sigmoid polyp with fragments of high-grade dysplasia.  She had a bout of diarrhea and feels the fissure returned.  The fissure was noted on colonoscopy back in January.  She now presents for follow-up.      Objective:  B/P: 116/78, T: 96.4, P: Data Unavailable, R: Data Unavailable  Rectal: Deferred    Assessment/plan:  This is a 43 lady with an anal fissure that was found on colonoscopy back in January.  She was on nifedipine prior to that without healing.  I discussed the options of Botox as a neck step and she is in agreement with that after discussion the patient plan at this time is taken the OR for an exam under anesthesia with Botox injection.  The procedure, risks, benefits, and alternatives were discussed and the patient agrees to proceed.    Robi Soto MD, FACS

## 2024-03-07 NOTE — TELEPHONE ENCOUNTER
Type of surgery: EXAM UNDER ANESTHESIA, WITH BOTULINUM TOXIN INJECTION   Location of surgery: [facility]  Date and time of surgery: 3/29  Surgeon: Brittany  Pre-Op Appt Date: 3/20  Post-Op Appt Date: Patient  declined, states too much going on. She will call if any issues.  Packet sent out: Yes  Pre-cert/Authorization completed:  Not Applicable  Date: na

## 2024-03-12 ENCOUNTER — PATIENT OUTREACH (OUTPATIENT)
Dept: GASTROENTEROLOGY | Facility: CLINIC | Age: 44
End: 2024-03-12
Payer: COMMERCIAL

## 2024-03-20 ENCOUNTER — OFFICE VISIT (OUTPATIENT)
Dept: INTERNAL MEDICINE | Facility: CLINIC | Age: 44
End: 2024-03-20
Payer: COMMERCIAL

## 2024-03-20 ENCOUNTER — LAB (OUTPATIENT)
Dept: LAB | Facility: CLINIC | Age: 44
End: 2024-03-20
Payer: COMMERCIAL

## 2024-03-20 VITALS
WEIGHT: 194.2 LBS | BODY MASS INDEX: 31.21 KG/M2 | HEART RATE: 88 BPM | RESPIRATION RATE: 16 BRPM | SYSTOLIC BLOOD PRESSURE: 114 MMHG | OXYGEN SATURATION: 100 % | DIASTOLIC BLOOD PRESSURE: 84 MMHG | HEIGHT: 66 IN | TEMPERATURE: 99.2 F

## 2024-03-20 DIAGNOSIS — Z12.31 VISIT FOR SCREENING MAMMOGRAM: ICD-10-CM

## 2024-03-20 DIAGNOSIS — R76.8 POSITIVE AUTOANTIBODY SCREENING FOR CELIAC DISEASE: ICD-10-CM

## 2024-03-20 DIAGNOSIS — E66.811 CLASS 1 OBESITY DUE TO EXCESS CALORIES WITHOUT SERIOUS COMORBIDITY WITH BODY MASS INDEX (BMI) OF 31.0 TO 31.9 IN ADULT: ICD-10-CM

## 2024-03-20 DIAGNOSIS — E03.9 HYPOTHYROIDISM, UNSPECIFIED TYPE: ICD-10-CM

## 2024-03-20 DIAGNOSIS — E66.09 CLASS 1 OBESITY DUE TO EXCESS CALORIES WITHOUT SERIOUS COMORBIDITY WITH BODY MASS INDEX (BMI) OF 31.0 TO 31.9 IN ADULT: ICD-10-CM

## 2024-03-20 DIAGNOSIS — K90.0 CELIAC DISEASE: ICD-10-CM

## 2024-03-20 DIAGNOSIS — K60.2 ANAL FISSURE: ICD-10-CM

## 2024-03-20 DIAGNOSIS — Z86.32 HISTORY OF GESTATIONAL DIABETES MELLITUS (GDM): ICD-10-CM

## 2024-03-20 DIAGNOSIS — F41.9 ANXIETY: ICD-10-CM

## 2024-03-20 DIAGNOSIS — K29.80 DUODENITIS DETERMINED BY BIOPSY: ICD-10-CM

## 2024-03-20 DIAGNOSIS — Z01.818 PREOP GENERAL PHYSICAL EXAM: Primary | ICD-10-CM

## 2024-03-20 LAB
PATH REPORT.COMMENTS IMP SPEC: NORMAL
PATH REPORT.FINAL DX SPEC: NORMAL
PATH REPORT.GROSS SPEC: NORMAL
PATH REPORT.MICROSCOPIC SPEC OTHER STN: NORMAL
PATH REPORT.MICROSCOPIC SPEC OTHER STN: NORMAL
PATH REPORT.RELEVANT HX SPEC: NORMAL
PHOTO IMAGE: NORMAL

## 2024-03-20 PROCEDURE — 81376 HLA II TYPING 1 LOCUS LR: CPT

## 2024-03-20 PROCEDURE — 99214 OFFICE O/P EST MOD 30 MIN: CPT | Performed by: INTERNAL MEDICINE

## 2024-03-20 PROCEDURE — 36415 COLL VENOUS BLD VENIPUNCTURE: CPT

## 2024-03-20 RX ORDER — CITALOPRAM HYDROBROMIDE 10 MG/1
10 TABLET ORAL DAILY
Qty: 30 TABLET | Refills: 0 | Status: SHIPPED | OUTPATIENT
Start: 2024-03-20 | End: 2024-04-14

## 2024-03-20 ASSESSMENT — PATIENT HEALTH QUESTIONNAIRE - PHQ9: SUM OF ALL RESPONSES TO PHQ QUESTIONS 1-9: 5

## 2024-03-20 ASSESSMENT — PAIN SCALES - GENERAL: PAINLEVEL: NO PAIN (0)

## 2024-03-20 NOTE — PROGRESS NOTES
Preoperative Evaluation  [address] [address]  Phone: [phone]  Primary Provider: Anyi Holder  Pre-op Performing Provider: TERI DOWNS  Mar 20, 2024       Estefania is a 43 year old, presenting for the following:  Pre-Op Exam and Anxiety      Surgical Information  Surgery/Procedure: EXAM UNDER ANESTHESIA, WITH BOTULINUM TOXIN INJECTION   Surgery Location:  OR  Surgeon: Robi Soto MD   Surgery Date: 03/29/2024  Time of Surgery: 9:00 AM  Where patient plans to recover: At home with family  Fax number for surgical facility: Note does not need to be faxed, will be available electronically in Epic.    Assessment & Plan     The proposed surgical procedure is considered LOW risk.    Preop general physical exam      Anal fissure      Anxiety    - citalopram (CELEXA) 10 MG tablet; Take 1 tablet (10 mg) by mouth daily    Duodenitis determined by biopsy  Continue omeprazole    Celiac disease  Workup underway    History of gestational diabetes mellitus (GDM)  Recommend weight loss, exercise limitation carbohydrates in the diet    Hypothyroidism, unspecified type  Continue thyroid supplementation    Class 1 obesity due to excess calories without serious comorbidity with body mass index (BMI) of 31.0 to 31.9 in adult  As above      Visit for screening mammogram  Screening  - MA SCREENING DIGITAL BILAT - Future  (s+30); Future      Possible Sleep Apnea: Based on examination and review of systems, patient is at risk for obstructive sleep apnea.  Please do not leave unattended in the supine position.        - No identified additional risk factors other than previously addressed    Antiplatelet or Anticoagulation Medication Instructions   - Patient is on no antiplatelet or anticoagulation medications.    Additional Medication Instructions  Patient is to take all scheduled medications on the day of surgery    Recommendation  APPROVAL  GIVEN to proceed with proposed procedure, without further diagnostic evaluation.          Subjective       HPI related to upcoming procedure: Patient has an anal fissure which has become painful and problematic.  She has had no response to conservative therapy and is now anticipating surgical intervention.        3/20/2024     1:48 PM   Preop Questions   1. Have you ever had a heart attack or stroke? No   2. Have you ever had surgery on your heart or blood vessels, such as a stent placement, a coronary artery bypass, or surgery on an artery in your head, neck, heart, or legs? No   3. Do you have chest pain with activity? No   4. Do you have a history of  heart failure? No   5. Do you currently have a cold, bronchitis or symptoms of other infection? No   6. Do you have a cough, shortness of breath, or wheezing? No   7. Do you or anyone in your family have previous history of blood clots? No   8. Do you or does anyone in your family have a serious bleeding problem such as prolonged bleeding following surgeries or cuts? No   9. Have you ever had problems with anemia or been told to take iron pills? No   10. Have you had any abnormal blood loss such as black, tarry or bloody stools, or abnormal vaginal bleeding? No   11. Have you ever had a blood transfusion? No   12. Are you willing to have a blood transfusion if it is medically needed before, during, or after your surgery? Yes   13. Have you or any of your relatives ever had problems with anesthesia? No   14. Do you have sleep apnea, excessive snoring or daytime drowsiness? YES -    14a. Do you have a CPAP machine? No   15. Do you have any artifical heart valves or other implanted medical devices like a pacemaker, defibrillator, or continuous glucose monitor? No   16. Do you have artificial joints? No   17. Are you allergic to latex? No   18. Is there any chance that you may be pregnant? No       Health Care Directive  Patient does not have a Health Care Directive or  "Living Will: Discussed advance care planning with patient; however, patient declined at this time.    Preoperative Review of    reviewed - no record of controlled substances prescribed.          Patient Active Problem List    Diagnosis Date Noted    Duodenitis determined by biopsy 02/09/2024     Priority: Medium    Tinnitus, right 02/06/2024     Priority: Medium    Celiac disease 02/06/2024     Priority: Medium    History of gestational diabetes mellitus (GDM) 11/20/2022     Priority: Medium    Class 1 obesity without serious comorbidity with body mass index (BMI) of 32.0 to 32.9 in adult, unspecified obesity type 11/20/2022     Priority: Medium    Hypothyroidism, unspecified type 10/20/2020     Priority: Medium      Past Medical History:   Diagnosis Date    Arthritis 2023    I think im starting to notice arthritis in my hands    Back pain     Hypothyroidism     Seasonal allergies      Past Surgical History:   Procedure Laterality Date    BIOPSY  ?    I ve had a couple of tumors biopsied over the years. Also had some tissue biopsied during my last endoscopy    COLONOSCOPY N/A 01/09/2024    Procedure: COLONOSCOPY, FLEXIBLE, WITH LESION REMOVAL USING SNARE;  Surgeon: Robi Soto MD;  Location:  GI    DILATION AND CURETTAGE SUCTION N/A 03/08/2022    Procedure: DILATION AND CURETTAGE, UTERUS, USING SUCTION;  Surgeon: Bairon Thomas MD;  Location: [facility]    ESOPHAGOSCOPY, GASTROSCOPY, DUODENOSCOPY (EGD), COMBINED N/A 01/09/2024    Procedure: ESOPHAGOGASTRODUODENOSCOPY, WITH BIOPSY;  Surgeon: Robi Soto MD;  Location:  GI    EYE SURGERY      \"benign tumor above right eye\"    MOUTH SURGERY      \"benign tumor under molar removed\"     Current Outpatient Medications   Medication Sig Dispense Refill    COMPOUNDED NON-CONTROLLED SUBSTANCE (CMPD RX) - PHARMACY TO MIX COMPOUNDED MEDICATION Please Compound 0.3% Nifedipine in 1.5% Lidocaine ointment  Apply 1 g topically to affected rectal area BID 60 g 3 "    desogestrel-ethinyl estradiol (APRI) 0.15-30 MG-MCG tablet Take 1 tablet by mouth daily Active pills continuously, skip placebos 112 tablet 0    Glucosamine-Chondroitin (GLUCOSAMINE CHONDR COMPLEX PO) Take 2 capsules by mouth daily      levothyroxine (SYNTHROID/LEVOTHROID) 100 MCG tablet Take 1 tablet (100 mcg) by mouth daily 90 tablet 3    Multiple Vitamins-Minerals (MULTI FOR HER) CAPS Take 1 capsule by mouth daily      omeprazole (PRILOSEC) 40 MG DR capsule Take 1 capsule (40 mg) by mouth daily 90 capsule 3    tiZANidine (ZANAFLEX) 2 MG tablet Take 1 tablet (2 mg) by mouth 3 times daily as needed for muscle spasms (for back pain) 30 tablet 0       No Known Allergies     Social History     Tobacco Use    Smoking status: Former     Packs/day: 0.00     Years: 10.00     Additional pack years: 0.00     Total pack years: 0.00     Types: Cigarettes     Quit date: 2018     Years since quittin.7    Smokeless tobacco: Never   Substance Use Topics    Alcohol use: Yes     Comment: Rarely     Family History   Problem Relation Age of Onset    Diabetes Mother     Thyroid Disease Mother     Other - See Comments Mother         B12 deficiancy    Hyperlipidemia Mother     Asthma Mother     No Known Problems Father     No Known Problems Sister     No Known Problems Brother     Skin Cancer Maternal Grandmother     Cancer Maternal Grandmother         skin    Thyroid Disease Maternal Grandmother     Other Cancer Maternal Grandmother         Skin    Emphysema Maternal Grandfather     Hypertension Paternal Grandmother     Thyroid Disease Paternal Grandmother     Emphysema Paternal Grandfather     No Known Problems Daughter     Cancer Maternal Aunt         stomach    Cancer Maternal Uncle         stomach    Lymphoma Paternal Aunt         intravascular    ALS Paternal Uncle      History   Drug Use    Types: Marijuana         Review of Systems    Review of Systems  CONSTITUTIONAL: NEGATIVE for fever, chills, change in  "weight  INTEGUMENTARY/SKIN: NEGATIVE for worrisome rashes, moles or lesions  EYES: NEGATIVE for vision changes or irritation  ENT/MOUTH: NEGATIVE for ear, mouth and throat problems  RESP: NEGATIVE for significant cough or SOB  BREAST: NEGATIVE for masses, tenderness or discharge  CV: NEGATIVE for chest pain, palpitations or peripheral edema  GI: Patient has a history of epigastric discomfort.  Recent biopsies confirmed gastritis and duodenitis.  She is getting results with continue PPI.  Patient had a atypical/dysplastic polyp and will need yearly colonoscopies until proven clear.  Patient has anal fissure as noted above  : NEGATIVE for frequency, dysuria, or hematuria  MUSCULOSKELETAL: NEGATIVE for significant arthralgias or myalgia  NEURO: NEGATIVE for weakness, dizziness or paresthesias  ENDOCRINE: NEGATIVE for temperature intolerance, skin/hair changes  HEME: NEGATIVE for bleeding problems  PSYCHIATRIC: Positive for situational anxiety.    Objective    LMP  (LMP Unknown)    Estimated body mass index is 31.39 kg/m  as calculated from the following:    Height as of 3/7/24: 1.683 m (5' 6.26\").    Weight as of 3/7/24: 88.9 kg (196 lb).  Physical Exam  GENERAL: alert and no distress  EYES: Eyes grossly normal to inspection, PERRL and conjunctivae and sclerae normal  HENT: ear canals and TM's normal, nose and mouth without ulcers or lesions  NECK: no adenopathy, no asymmetry, masses, or scars  RESP: lungs clear to auscultation - no rales, rhonchi or wheezes  CV: regular rate and rhythm, normal S1 S2, no S3 or S4, no murmur, click or rub, no peripheral edema  ABDOMEN: soft, nontender, no hepatosplenomegaly, no masses and bowel sounds normal  MS: no gross musculoskeletal defects noted, no edema  SKIN: no suspicious lesions or rashes  NEURO: Normal strength and tone, mentation intact and speech normal  PSYCH: mentation appears normal, affect normal/bright    No results for input(s): \"HGB\", \"PLT\", \"INR\", \"NA\", " "\"POTASSIUM\", \"CR\", \"A1C\" in the last 21192 hours.     Diagnostics  No labs were ordered during this visit.   No EKG required for low risk surgery (cataract, skin procedure, breast biopsy, etc).    Revised Cardiac Risk Index (RCRI)  The patient has the following serious cardiovascular risks for perioperative complications:   - No serious cardiac risks = 0 points     RCRI Interpretation: 0 points: Class I (very low risk - 0.4% complication rate)         Signed Electronically by: Johnathon Colon DO  Copy of this evaluation report is provided to requesting physician.         "

## 2024-03-21 DIAGNOSIS — E66.811 CLASS 1 OBESITY DUE TO EXCESS CALORIES WITHOUT SERIOUS COMORBIDITY WITH BODY MASS INDEX (BMI) OF 31.0 TO 31.9 IN ADULT: Primary | ICD-10-CM

## 2024-03-21 DIAGNOSIS — E66.09 CLASS 1 OBESITY DUE TO EXCESS CALORIES WITHOUT SERIOUS COMORBIDITY WITH BODY MASS INDEX (BMI) OF 31.0 TO 31.9 IN ADULT: Primary | ICD-10-CM

## 2024-03-21 NOTE — TELEPHONE ENCOUNTER
Patient calls in requesting next dose of Wegovy. She was seen for preop yesterday and has completed her 4 doses of 0.5 mg and would like to increase the dose. See orders. Has appt next month for follow up.   Anyi Holder MD

## 2024-03-22 ENCOUNTER — HOSPITAL ENCOUNTER (OUTPATIENT)
Dept: MAMMOGRAPHY | Facility: CLINIC | Age: 44
Discharge: HOME | End: 2024-03-22
Attending: FAMILY MEDICINE | Admitting: FAMILY MEDICINE
Payer: COMMERCIAL

## 2024-03-22 DIAGNOSIS — Z12.31 VISIT FOR SCREENING MAMMOGRAM: ICD-10-CM

## 2024-03-22 PROCEDURE — 77063 BREAST TOMOSYNTHESIS BI: CPT

## 2024-03-27 LAB
DQA1* LOCUS: NORMAL
DQA1*: NORMAL
DQB1* LOCUS: NORMAL
DQB1*: NORMAL
DRSSO TEST METHOD: NORMAL
ZZZDRSSO COMMENTS: NORMAL

## 2024-03-28 ENCOUNTER — ANESTHESIA EVENT (OUTPATIENT)
Dept: SURGERY | Facility: CLINIC | Age: 44
End: 2024-03-28
Payer: COMMERCIAL

## 2024-03-28 ASSESSMENT — LIFESTYLE VARIABLES: TOBACCO_USE: 1

## 2024-03-29 ENCOUNTER — HOSPITAL ENCOUNTER (OUTPATIENT)
Facility: CLINIC | Age: 44
Discharge: HOME | End: 2024-03-29
Attending: SPECIALIST | Admitting: SPECIALIST
Payer: COMMERCIAL

## 2024-03-29 ENCOUNTER — ANESTHESIA (OUTPATIENT)
Dept: SURGERY | Facility: CLINIC | Age: 44
End: 2024-03-29
Payer: COMMERCIAL

## 2024-03-29 VITALS
SYSTOLIC BLOOD PRESSURE: 108 MMHG | OXYGEN SATURATION: 98 % | DIASTOLIC BLOOD PRESSURE: 93 MMHG | TEMPERATURE: 98.4 F | HEART RATE: 71 BPM | RESPIRATION RATE: 18 BRPM

## 2024-03-29 PROCEDURE — 999N000141 HC STATISTIC PRE-PROCEDURE NURSING ASSESSMENT: Performed by: SPECIALIST

## 2024-03-29 PROCEDURE — 250N000011 HC RX IP 250 OP 636: Performed by: NURSE ANESTHETIST, CERTIFIED REGISTERED

## 2024-03-29 PROCEDURE — 258N000003 HC RX IP 258 OP 636: Performed by: NURSE ANESTHETIST, CERTIFIED REGISTERED

## 2024-03-29 PROCEDURE — 250N000020 HC RX IP 250 OP 636 J0585: Mod: JZ | Performed by: SPECIALIST

## 2024-03-29 PROCEDURE — 360N000074 HC SURGERY LEVEL 1, PER MIN: Performed by: SPECIALIST

## 2024-03-29 PROCEDURE — 250N000009 HC RX 250: Performed by: SPECIALIST

## 2024-03-29 PROCEDURE — 250N000011 HC RX IP 250 OP 636: Performed by: SPECIALIST

## 2024-03-29 PROCEDURE — 710N000012 HC RECOVERY PHASE 2, PER MINUTE: Performed by: SPECIALIST

## 2024-03-29 PROCEDURE — 370N000017 HC ANESTHESIA TECHNICAL FEE, PER MIN: Performed by: SPECIALIST

## 2024-03-29 PROCEDURE — 46505 CHEMODENERVATION ANAL MUSC: CPT | Performed by: SPECIALIST

## 2024-03-29 PROCEDURE — 272N000001 HC OR GENERAL SUPPLY STERILE: Performed by: SPECIALIST

## 2024-03-29 PROCEDURE — 250N000009 HC RX 250: Performed by: NURSE ANESTHETIST, CERTIFIED REGISTERED

## 2024-03-29 RX ORDER — ONDANSETRON 4 MG/1
4 TABLET, ORALLY DISINTEGRATING ORAL EVERY 30 MIN PRN
Status: DISCONTINUED | OUTPATIENT
Start: 2024-03-29 | End: 2024-03-29 | Stop reason: HOSPADM

## 2024-03-29 RX ORDER — LIDOCAINE HYDROCHLORIDE 20 MG/ML
INJECTION, SOLUTION INFILTRATION; PERINEURAL PRN
Status: DISCONTINUED | OUTPATIENT
Start: 2024-03-29 | End: 2024-03-29

## 2024-03-29 RX ORDER — KETOROLAC TROMETHAMINE 30 MG/ML
INJECTION, SOLUTION INTRAMUSCULAR; INTRAVENOUS PRN
Status: DISCONTINUED | OUTPATIENT
Start: 2024-03-29 | End: 2024-03-29

## 2024-03-29 RX ORDER — CEFAZOLIN SODIUM/WATER 2 G/20 ML
2 SYRINGE (ML) INTRAVENOUS
Status: COMPLETED | OUTPATIENT
Start: 2024-03-29 | End: 2024-03-29

## 2024-03-29 RX ORDER — PROPOFOL 10 MG/ML
INJECTION, EMULSION INTRAVENOUS CONTINUOUS PRN
Status: DISCONTINUED | OUTPATIENT
Start: 2024-03-29 | End: 2024-03-29

## 2024-03-29 RX ORDER — ONDANSETRON 2 MG/ML
INJECTION INTRAMUSCULAR; INTRAVENOUS PRN
Status: DISCONTINUED | OUTPATIENT
Start: 2024-03-29 | End: 2024-03-29

## 2024-03-29 RX ORDER — SODIUM CHLORIDE, SODIUM LACTATE, POTASSIUM CHLORIDE, CALCIUM CHLORIDE 600; 310; 30; 20 MG/100ML; MG/100ML; MG/100ML; MG/100ML
INJECTION, SOLUTION INTRAVENOUS CONTINUOUS
Status: DISCONTINUED | OUTPATIENT
Start: 2024-03-29 | End: 2024-03-29 | Stop reason: HOSPADM

## 2024-03-29 RX ORDER — DEXAMETHASONE SODIUM PHOSPHATE 4 MG/ML
INJECTION, SOLUTION INTRA-ARTICULAR; INTRALESIONAL; INTRAMUSCULAR; INTRAVENOUS; SOFT TISSUE PRN
Status: DISCONTINUED | OUTPATIENT
Start: 2024-03-29 | End: 2024-03-29

## 2024-03-29 RX ORDER — LIDOCAINE 40 MG/G
CREAM TOPICAL
Status: DISCONTINUED | OUTPATIENT
Start: 2024-03-29 | End: 2024-03-29 | Stop reason: HOSPADM

## 2024-03-29 RX ORDER — FENTANYL CITRATE 50 UG/ML
25 INJECTION, SOLUTION INTRAMUSCULAR; INTRAVENOUS
Status: DISCONTINUED | OUTPATIENT
Start: 2024-03-29 | End: 2024-03-29 | Stop reason: HOSPADM

## 2024-03-29 RX ORDER — CEFAZOLIN SODIUM/WATER 2 G/20 ML
2 SYRINGE (ML) INTRAVENOUS SEE ADMIN INSTRUCTIONS
Status: DISCONTINUED | OUTPATIENT
Start: 2024-03-29 | End: 2024-03-29 | Stop reason: HOSPADM

## 2024-03-29 RX ORDER — FENTANYL CITRATE 50 UG/ML
INJECTION, SOLUTION INTRAMUSCULAR; INTRAVENOUS PRN
Status: DISCONTINUED | OUTPATIENT
Start: 2024-03-29 | End: 2024-03-29

## 2024-03-29 RX ORDER — ONDANSETRON 2 MG/ML
4 INJECTION INTRAMUSCULAR; INTRAVENOUS EVERY 30 MIN PRN
Status: DISCONTINUED | OUTPATIENT
Start: 2024-03-29 | End: 2024-03-29 | Stop reason: HOSPADM

## 2024-03-29 RX ORDER — PROPOFOL 10 MG/ML
INJECTION, EMULSION INTRAVENOUS PRN
Status: DISCONTINUED | OUTPATIENT
Start: 2024-03-29 | End: 2024-03-29

## 2024-03-29 RX ADMIN — PROPOFOL 100 MG: 10 INJECTION, EMULSION INTRAVENOUS at 08:41

## 2024-03-29 RX ADMIN — LIDOCAINE HYDROCHLORIDE 50 MG: 20 INJECTION, SOLUTION INFILTRATION; PERINEURAL at 08:41

## 2024-03-29 RX ADMIN — FENTANYL CITRATE 50 MCG: 50 INJECTION INTRAMUSCULAR; INTRAVENOUS at 08:33

## 2024-03-29 RX ADMIN — MIDAZOLAM 1 MG: 1 INJECTION INTRAMUSCULAR; INTRAVENOUS at 08:33

## 2024-03-29 RX ADMIN — DEXMEDETOMIDINE HYDROCHLORIDE 10 MCG: 100 INJECTION, SOLUTION INTRAVENOUS at 08:48

## 2024-03-29 RX ADMIN — Medication 2 G: at 08:33

## 2024-03-29 RX ADMIN — PROPOFOL 150 MCG/KG/MIN: 10 INJECTION, EMULSION INTRAVENOUS at 08:41

## 2024-03-29 RX ADMIN — SODIUM CHLORIDE, POTASSIUM CHLORIDE, SODIUM LACTATE AND CALCIUM CHLORIDE: 600; 310; 30; 20 INJECTION, SOLUTION INTRAVENOUS at 08:01

## 2024-03-29 RX ADMIN — DEXAMETHASONE SODIUM PHOSPHATE 6 MG: 4 INJECTION, SOLUTION INTRA-ARTICULAR; INTRALESIONAL; INTRAMUSCULAR; INTRAVENOUS; SOFT TISSUE at 08:48

## 2024-03-29 RX ADMIN — KETOROLAC TROMETHAMINE 30 MG: 30 INJECTION, SOLUTION INTRAMUSCULAR at 09:05

## 2024-03-29 RX ADMIN — ONDANSETRON 4 MG: 2 INJECTION INTRAMUSCULAR; INTRAVENOUS at 08:48

## 2024-03-29 ASSESSMENT — ACTIVITIES OF DAILY LIVING (ADL)
ADLS_ACUITY_SCORE: 31
ADLS_ACUITY_SCORE: 31

## 2024-03-29 NOTE — ANESTHESIA PREPROCEDURE EVALUATION
"Anesthesia Pre-Procedure Evaluation    Patient: Estefania Arguello   MRN: [id number] : 1980        Procedure : Procedure(s):  EXAM UNDER ANESTHESIA, WITH BOTULINUM TOXIN INJECTION          Past Medical History:   Diagnosis Date    Arthritis     I think im starting to notice arthritis in my hands    Back pain     Hypothyroidism     Seasonal allergies       Past Surgical History:   Procedure Laterality Date    BIOPSY  ?    I ve had a couple of tumors biopsied over the years. Also had some tissue biopsied during my last endoscopy    COLONOSCOPY N/A 2024    Procedure: COLONOSCOPY, FLEXIBLE, WITH LESION REMOVAL USING SNARE;  Surgeon: Robi Soto MD;  Location:  GI    DILATION AND CURETTAGE SUCTION N/A 2022    Procedure: DILATION AND CURETTAGE, UTERUS, USING SUCTION;  Surgeon: Bairon Thomas MD;  Location: [facility]    ESOPHAGOSCOPY, GASTROSCOPY, DUODENOSCOPY (EGD), COMBINED N/A 2024    Procedure: ESOPHAGOGASTRODUODENOSCOPY, WITH BIOPSY;  Surgeon: Robi Soto MD;  Location:  GI    EYE SURGERY      \"benign tumor above right eye\"    MOUTH SURGERY      \"benign tumor under molar removed\"      No Known Allergies   Social History     Tobacco Use    Smoking status: Former     Packs/day: 0.00     Years: 10.00     Additional pack years: 0.00     Total pack years: 0.00     Types: Cigarettes     Quit date: 2018     Years since quittin.7    Smokeless tobacco: Never   Substance Use Topics    Alcohol use: Yes     Comment: Rarely      Wt Readings from Last 1 Encounters:   24 88.1 kg (194 lb 3.2 oz)        Anesthesia Evaluation   Pt has had prior anesthetic.     No history of anesthetic complications       ROS/MED HX  ENT/Pulmonary:     (+)                tobacco use, Past use,                       Neurologic:  - neg neurologic ROS     Cardiovascular:  - neg cardiovascular ROS   (+)  - -   -  - -                                 No previous cardiac testing     METS/Exercise " "Tolerance:     Hematologic:  - neg hematologic  ROS     Musculoskeletal: Comment: CLBP      GI/Hepatic:  - neg GI/hepatic ROS  (-) GERD   Renal/Genitourinary:  - neg Renal ROS     Endo:     (+)          thyroid problem, hypothyroidism,    Obesity,       Psychiatric/Substance Use:  - neg psychiatric ROS     Infectious Disease:  - neg infectious disease ROS     Malignancy:  - neg malignancy ROS     Other:  - neg other ROS          Physical Exam    Airway  airway exam normal      Mallampati: II   TM distance: > 3 FB   Neck ROM: full   Mouth opening: > 3 cm    Respiratory Devices and Support         Dental       (+) Completely normal teeth      Cardiovascular   cardiovascular exam normal       Rhythm and rate: regular and normal     Pulmonary   pulmonary exam normal        breath sounds clear to auscultation           OUTSIDE LABS:  CBC:   Lab Results   Component Value Date    WBC 5.6 03/04/2022    WBC 6.0 02/11/2022    HGB 14.1 03/08/2022    HGB 14.3 03/04/2022    HCT 42.2 03/04/2022    HCT 43.2 02/11/2022     03/04/2022     02/11/2022     BMP:   Lab Results   Component Value Date     04/02/2021     01/22/2021    POTASSIUM 4.1 04/02/2021    POTASSIUM 3.6 01/22/2021    CHLORIDE 105 04/02/2021    CHLORIDE 105 01/22/2021    CO2 26 04/02/2021    CO2 26 01/22/2021    BUN 13 04/02/2021    BUN 10 01/22/2021    CR 0.54 04/02/2021    CR 0.54 01/22/2021    GLC 93 04/02/2021     (H) 01/22/2021     COAGS: No results found for: \"PTT\", \"INR\", \"FIBR\"  POC:   Lab Results   Component Value Date    BGM 76 05/07/2021    HCG Positive (A) 06/18/2020     HEPATIC:   Lab Results   Component Value Date    ALBUMIN 2.5 (L) 04/02/2021    PROTTOTAL 6.6 (L) 04/02/2021    ALT 29 04/02/2021    AST 16 04/02/2021    ALKPHOS 71 04/02/2021    BILITOTAL 0.2 04/02/2021     OTHER:   Lab Results   Component Value Date    A1C 5.2 02/11/2022    LATHA 8.4 (L) 04/02/2021    TSH 0.91 02/06/2024    T4 1.07 02/11/2022    T4 13.0 " "02/11/2022       Anesthesia Plan    ASA Status:  2    NPO Status:  NPO Appropriate    Anesthesia Type: MAC.     - Reason for MAC: Deep or markedly invasive procedure (G8)   Induction: Intravenous.   Maintenance: TIVA.        Consents    Anesthesia Plan(s) and associated risks, benefits, and realistic alternatives discussed. Questions answered and patient/representative(s) expressed understanding.     - Discussed:     - Discussed with:  Patient      - Extended Intubation/Ventilatory Support Discussed: No.      - Patient is DNR/DNI Status: No     Use of blood products discussed: No .     Postoperative Care    Pain management: Multi-modal analgesia, IV analgesics.   PONV prophylaxis: Background Propofol Infusion, Ondansetron (or other 5HT-3), Dexamethasone or Solumedrol     Comments:    Other Comments: The risks and benefits of anesthesia, and the alternatives where applicable, have been discussed with the patient, and they wish to proceed.             ADARSH York CRNA    I have reviewed the pertinent notes and labs in the chart from the past 30 days and (re)examined the patient.  Any updates or changes from those notes are reflected in this note.              # Obesity: Estimated body mass index is 31.06 kg/m  as calculated from the following:    Height as of 3/20/24: 1.684 m (5' 6.3\").    Weight as of 3/20/24: 88.1 kg (194 lb 3.2 oz).      "

## 2024-03-29 NOTE — ANESTHESIA CARE TRANSFER NOTE
Patient: Estefania Arguello    Procedure: Procedure(s):  EXAM UNDER ANESTHESIA, WITH BOTULINUM TOXIN INJECTION       Diagnosis: Anal fissure [K60.2]  Diagnosis Additional Information: No value filed.    Anesthesia Type:   MAC     Note:    Oropharynx: spontaneously breathing  Level of Consciousness: awake  Oxygen Supplementation: room air    Independent Airway: airway patency satisfactory and stable  Dentition: dentition unchanged  Vital Signs Stable: post-procedure vital signs reviewed and stable  Report to RN Given: handoff report given  Patient transferred to: Phase II    Handoff Report: Identifed the Patient, Identified the Reponsible Provider, Reviewed the pertinent medical history, Discussed the surgical course, Reviewed Intra-OP anesthesia mangement and issues during anesthesia, Set expectations for post-procedure period and Allowed opportunity for questions and acknowledgement of understanding    Vitals:  Vitals Value Taken Time   BP     Temp     Pulse     Resp     SpO2 97 % 03/29/24 0917   Vitals shown include unfiled device data.    Electronically Signed By: ADARSH York CRNA  March 29, 2024  9:19 AM

## 2024-03-29 NOTE — OP NOTE
[facility] Operative Note    Pre-operative diagnosis: Anal fissure [K60.2]   Post-operative diagnosis: Same   Procedure: Procedure(s):  EXAM UNDER ANESTHESIA, WITH BOTULINUM TOXIN INJECTION   Surgeon: Robi Soto MD, FACS   Assistant(s): None   Anesthesia: MAC   Estimated blood loss: Minimal   Total IV fluids: (See anesthesia record)   Blood transfusion: No transfusion was given during surgery   Total urine output: (See anesthesia record)   Drains: None   Specimens: None   Implants: None   Findings: Small posterior midline anal fissure partially healed   Complications: None   Condition: Stable   Comments: Indication procedure: This a 43-year lady well-known to me for posterior midline anal fissure.  On exam was still only partially healed and she continued to have symptoms.  She failed a trial of nifedipine so was elected to inject Botox.     Details procedure:  Patient was taken the operating placed table in supine position.  After receiving some sedation she was placed in lithotomy position.  A timeout was performed confirm the date and the patient was a procedure performed.  A digital rectal exam was then carried out and a Jacobson retractor was inserted.  A small posterior midline fissure was seen.  It appeared to now be partially healed since she was last seen.  The remaining open fissure was curetted.  50 units of Botox were injected into the muscle on each side of the fissure for a total of 100 units.  Local anesthetic was placed around the fissure for analgesia relief.  She was then taken from the operating to recovery in stable condition to be sent home    Robi Soto MD, FACS

## 2024-03-29 NOTE — DISCHARGE INSTRUCTIONS
"You received \"Ibuprofen\" (Toradol) at 9:05 a.m. The next time you can take Ibuprofen today is at 3:00 p.m.  "

## 2024-03-29 NOTE — ADDENDUM NOTE
Addendum  created 03/29/24 1025 by Leland Streeter APRN CRNA    Flowsheet accepted, Flowsheet data copied forward, Intraprocedure Meds edited

## 2024-04-14 ENCOUNTER — MYC REFILL (OUTPATIENT)
Dept: INTERNAL MEDICINE | Facility: CLINIC | Age: 44
End: 2024-04-14
Payer: COMMERCIAL

## 2024-04-14 DIAGNOSIS — F41.9 ANXIETY: ICD-10-CM

## 2024-04-15 RX ORDER — CITALOPRAM HYDROBROMIDE 10 MG/1
10 TABLET ORAL DAILY
Qty: 30 TABLET | Refills: 0 | Status: SHIPPED | OUTPATIENT
Start: 2024-04-15 | End: 2024-04-24

## 2024-04-24 ENCOUNTER — TELEPHONE (OUTPATIENT)
Dept: FAMILY MEDICINE | Facility: CLINIC | Age: 44
End: 2024-04-24

## 2024-04-24 ENCOUNTER — OFFICE VISIT (OUTPATIENT)
Dept: FAMILY MEDICINE | Facility: CLINIC | Age: 44
End: 2024-04-24
Payer: COMMERCIAL

## 2024-04-24 VITALS
HEIGHT: 66 IN | WEIGHT: 190 LBS | HEART RATE: 82 BPM | TEMPERATURE: 98 F | DIASTOLIC BLOOD PRESSURE: 82 MMHG | BODY MASS INDEX: 30.53 KG/M2 | OXYGEN SATURATION: 100 % | SYSTOLIC BLOOD PRESSURE: 118 MMHG

## 2024-04-24 DIAGNOSIS — K90.0 CELIAC DISEASE: ICD-10-CM

## 2024-04-24 DIAGNOSIS — F41.9 ANXIETY: ICD-10-CM

## 2024-04-24 DIAGNOSIS — E03.9 HYPOTHYROIDISM, UNSPECIFIED TYPE: ICD-10-CM

## 2024-04-24 DIAGNOSIS — Z30.41 ORAL CONTRACEPTIVE PILL SURVEILLANCE: ICD-10-CM

## 2024-04-24 DIAGNOSIS — E66.811 CLASS 1 OBESITY DUE TO EXCESS CALORIES WITHOUT SERIOUS COMORBIDITY WITH BODY MASS INDEX (BMI) OF 30.0 TO 30.9 IN ADULT: ICD-10-CM

## 2024-04-24 DIAGNOSIS — E66.09 CLASS 1 OBESITY DUE TO EXCESS CALORIES WITHOUT SERIOUS COMORBIDITY WITH BODY MASS INDEX (BMI) OF 30.0 TO 30.9 IN ADULT: ICD-10-CM

## 2024-04-24 DIAGNOSIS — Z86.32 HISTORY OF GESTATIONAL DIABETES MELLITUS (GDM): ICD-10-CM

## 2024-04-24 DIAGNOSIS — Z00.00 ROUTINE GENERAL MEDICAL EXAMINATION AT A HEALTH CARE FACILITY: Primary | ICD-10-CM

## 2024-04-24 DIAGNOSIS — K60.2 ANAL FISSURE: ICD-10-CM

## 2024-04-24 DIAGNOSIS — G43.909 MIGRAINE WITHOUT STATUS MIGRAINOSUS, NOT INTRACTABLE, UNSPECIFIED MIGRAINE TYPE: ICD-10-CM

## 2024-04-24 LAB
ERYTHROCYTE [DISTWIDTH] IN BLOOD BY AUTOMATED COUNT: 11.5 % (ref 10–15)
FERRITIN SERPL-MCNC: 169 NG/ML (ref 6–175)
HBA1C MFR BLD: 5 %
HCT VFR BLD AUTO: 41.9 % (ref 35–47)
HGB BLD-MCNC: 14.4 G/DL (ref 11.7–15.7)
MCH RBC QN AUTO: 31.9 PG (ref 26.5–33)
MCHC RBC AUTO-ENTMCNC: 34.4 G/DL (ref 31.5–36.5)
MCV RBC AUTO: 93 FL (ref 78–100)
PLATELET # BLD AUTO: 299 10E3/UL (ref 150–450)
RBC # BLD AUTO: 4.52 10E6/UL (ref 3.8–5.2)
WBC # BLD AUTO: 5.8 10E3/UL (ref 4–11)

## 2024-04-24 PROCEDURE — 83036 HEMOGLOBIN GLYCOSYLATED A1C: CPT | Performed by: FAMILY MEDICINE

## 2024-04-24 PROCEDURE — 85027 COMPLETE CBC AUTOMATED: CPT | Performed by: FAMILY MEDICINE

## 2024-04-24 PROCEDURE — 82728 ASSAY OF FERRITIN: CPT | Performed by: FAMILY MEDICINE

## 2024-04-24 PROCEDURE — 99214 OFFICE O/P EST MOD 30 MIN: CPT | Mod: 25 | Performed by: FAMILY MEDICINE

## 2024-04-24 PROCEDURE — 99396 PREV VISIT EST AGE 40-64: CPT | Performed by: FAMILY MEDICINE

## 2024-04-24 PROCEDURE — 36415 COLL VENOUS BLD VENIPUNCTURE: CPT | Performed by: FAMILY MEDICINE

## 2024-04-24 PROCEDURE — 82607 VITAMIN B-12: CPT | Performed by: FAMILY MEDICINE

## 2024-04-24 PROCEDURE — 82306 VITAMIN D 25 HYDROXY: CPT | Performed by: FAMILY MEDICINE

## 2024-04-24 RX ORDER — DESOGESTREL AND ETHINYL ESTRADIOL 0.15-0.03
KIT ORAL
Qty: 112 TABLET | Refills: 3 | Status: SHIPPED | OUTPATIENT
Start: 2024-04-24

## 2024-04-24 RX ORDER — HYDROCORTISONE ACETATE 25 MG/1
25 SUPPOSITORY RECTAL 2 TIMES DAILY PRN
Qty: 24 SUPPOSITORY | Refills: 1 | Status: SHIPPED | OUTPATIENT
Start: 2024-04-24

## 2024-04-24 RX ORDER — CITALOPRAM HYDROBROMIDE 20 MG/1
20 TABLET ORAL DAILY
Qty: 90 TABLET | Refills: 1 | Status: SHIPPED | OUTPATIENT
Start: 2024-04-24 | End: 2024-06-21

## 2024-04-24 RX ORDER — SUMATRIPTAN 50 MG/1
50 TABLET, FILM COATED ORAL
Qty: 9 TABLET | Refills: 2 | Status: SHIPPED | OUTPATIENT
Start: 2024-04-24 | End: 2024-05-17

## 2024-04-24 SDOH — HEALTH STABILITY: PHYSICAL HEALTH: ON AVERAGE, HOW MANY DAYS PER WEEK DO YOU ENGAGE IN MODERATE TO STRENUOUS EXERCISE (LIKE A BRISK WALK)?: 3 DAYS

## 2024-04-24 ASSESSMENT — SOCIAL DETERMINANTS OF HEALTH (SDOH): HOW OFTEN DO YOU GET TOGETHER WITH FRIENDS OR RELATIVES?: ONCE A WEEK

## 2024-04-24 ASSESSMENT — PAIN SCALES - GENERAL: PAINLEVEL: NO PAIN (0)

## 2024-04-24 NOTE — PATIENT INSTRUCTIONS
Estefania, I apologize but I could not find a doctor that I know well enough to recommend in the [facility] area.   As for gastroenterology, we still have Dr. Atkinson here, who is a GI specialist and does scopes, so that is who I recommend for you.   I have refilled all your medications as needed today with the changes as we discussed.   Please update me in the next month as to how you are doing with the new medication doses (Wygovy, Celexa, and the imitrex). I will have my nurse contact you to set up a medication followup in 1-2 months. This can be virtual depending on where you are at that time, but we can review meds at that time.     I will notify you with lab results from today.   Preventive Care Advice   This is general advice given by our system to help you stay healthy. However, your care team may have specific advice just for you. Please talk to your care team about your preventive care needs.  Nutrition  Eat 5 or more servings of fruits and vegetables each day.  Try wheat bread, brown rice and whole grain pasta (instead of white bread, rice, and pasta).  Get enough calcium and vitamin D. Check the label on foods and aim for 100% of the RDA (recommended daily allowance).  Lifestyle  Exercise at least 150 minutes each week   (30 minutes a day, 5 days a week).  Do muscle strengthening activities 2 days a week. These help control your weight and prevent disease.  No smoking.  Wear sunscreen to prevent skin cancer.  Have a dental exam and cleaning every 6 months.  Yearly exams  See your health care team every year to talk about:  Any changes in your health.  Any medicines your care team has prescribed.  Preventive care, family planning, and ways to prevent chronic diseases.  Shots (vaccines)   HPV shots (up to age 26), if you've never had them before.  Hepatitis B shots (up to age 59), if you've never had them before.  COVID-19 shot: Get this shot when it's due.  Flu shot: Get a flu shot every year.  Tetanus  shot: Get a tetanus shot every 10 years.  Pneumococcal, hepatitis A, and RSV shots: Ask your care team if you need these based on your risk.  Shingles shot (for age 50 and up).  General health tests  Diabetes screening:  Starting at age 35, Get screened for diabetes at least every 3 years.  If you are younger than age 35, ask your care team if you should be screened for diabetes.  Cholesterol test: At age 39, start having a cholesterol test every 5 years, or more often if advised.  Bone density scan (DEXA): At age 50, ask your care team if you should have this scan for osteoporosis (brittle bones).  Hepatitis C: Get tested at least once in your life.  STIs (sexually transmitted infections)  Before age 24: Ask your care team if you should be screened for STIs.  After age 24: Get screened for STIs if you're at risk. You are at risk for STIs (including HIV) if:  You are sexually active with more than one person.  You don't use condoms every time.  You or a partner was diagnosed with a sexually transmitted infection.  If you are at risk for HIV, ask about PrEP medicine to prevent HIV.  Get tested for HIV at least once in your life, whether you are at risk for HIV or not.  Cancer screening tests  Cervical cancer screening: If you have a cervix, begin getting regular cervical cancer screening tests at age 21. Most people who have regular screenings with normal results can stop after age 65. Talk about this with your provider.  Breast cancer scan (mammogram): If you've ever had breasts, begin having regular mammograms starting at age 40. This is a scan to check for breast cancer.  Colon cancer screening: It is important to start screening for colon cancer at age 45.  Have a colonoscopy test every 10 years (or more often if you're at risk) Or, ask your provider about stool tests like a FIT test every year or Cologuard test every 3 years.  To learn more about your testing options, visit:  [email or web address].  For help making a decision, visit: https://bit.ly/vd56209.  Prostate cancer screening test: If you have a prostate and are age 55 to 69, ask your provider if you would benefit from a yearly prostate cancer screening test.  Lung cancer screening: If you are a current or former smoker age 50 to 80, ask your care team if ongoing lung cancer screenings are right for you.  For informational purposes only. Not to replace the advice of your health care provider. Copyright   2023 [employer]. All rights reserved. Clinically reviewed by the [facility] Transitions Program. Circuport 120711 - REV 01/24.    Learning About Stress  What is stress?     Stress is your body's response to a hard situation. Your body can have a physical, emotional, or mental response. Stress is a fact of life for most people, and it affects everyone differently. What causes stress for you may not be stressful for someone else.  A lot of things can cause stress. You may feel stress when you go on a job interview, take a test, or run a race. This kind of short-term stress is normal and even useful. It can help you if you need to work hard or react quickly. For example, stress can help you finish an important job on time.  Long-term stress is caused by ongoing stressful situations or events. Examples of long-term stress include long-term health problems, ongoing problems at work, or conflicts in your family. Long-term stress can harm your health.  How does stress affect your health?  When you are stressed, your body responds as though you are in danger. It makes hormones that speed up your heart, make you breathe faster, and give you a burst of energy. This is called the fight-or-flight stress response. If the stress is over quickly, your body goes back to normal and no harm is done.  But if stress happens too often or lasts too long, it can have bad effects. Long-term stress can make you more  likely to get sick, and it can make symptoms of some diseases worse. If you tense up when you are stressed, you may develop neck, shoulder, or low back pain. Stress is linked to high blood pressure and heart disease.  Stress also harms your emotional health. It can make you melendez, tense, or depressed. Your relationships may suffer, and you may not do well at work or school.  What can you do to manage stress?  You can try these things to help manage stress:   Do something active. Exercise or activity can help reduce stress. Walking is a great way to get started. Even everyday activities such as housecleaning or yard work can help.  Try yoga or alia chi. These techniques combine exercise and meditation. You may need some training at first to learn them.  Do something you enjoy. For example, listen to music or go to a movie. Practice your hobby or do volunteer work.  Meditate. This can help you relax, because you are not worrying about what happened before or what may happen in the future.  Do guided imagery. Imagine yourself in any setting that helps you feel calm. You can use online videos, books, or a teacher to guide you.  Do breathing exercises. For example:  From a standing position, bend forward from the waist with your knees slightly bent. Let your arms dangle close to the floor.  Breathe in slowly and deeply as you return to a standing position. Roll up slowly and lift your head last.  Hold your breath for just a few seconds in the standing position.  Breathe out slowly and bend forward from the waist.  Let your feelings out. Talk, laugh, cry, and express anger when you need to. Talking with supportive friends or family, a counselor, or a byron leader about your feelings is a healthy way to relieve stress. Avoid discussing your feelings with people who make you feel worse.  Write. It may help to write about things that are bothering you. This helps you find out how much stress you feel and what is causing it.  "When you know this, you can find better ways to cope.  What can you do to prevent stress?  You might try some of these things to help prevent stress:  Manage your time. This helps you find time to do the things you want and need to do.  Get enough sleep. Your body recovers from the stresses of the day while you are sleeping.  Get support. Your family, friends, and community can make a difference in how you experience stress.  Limit your news feed. Avoid or limit time on social media or news that may make you feel stressed.  Do something active. Exercise or activity can help reduce stress. Walking is a great way to get started.  Where can you learn more?  Go to https://www.[employer].net/patiented  Enter N032 in the search box to learn more about \"Learning About Stress.\"  Current as of: October 24, 2023               Content Version: 14.0    9314-1067 [employer].   Care instructions adapted under license by your healthcare professional. If you have questions about a medical condition or this instruction, always ask your healthcare professional. [employer] disclaims any warranty or liability for your use of this information.      Substance Use Disorder: Care Instructions  Overview     You can improve your life and health by stopping your use of alcohol or drugs. When you don't drink or use drugs, you may feel and sleep better. You may get along better with your family, friends, and coworkers. There are medicines and programs that can help with substance use disorder.  How can you care for yourself at home?  Here are some ways to help you stay sober and prevent relapse.  If you have been given medicine to help keep you sober or reduce your cravings, be sure to take it exactly as prescribed.  Talk to your doctor about programs that can help you stop using drugs or drinking alcohol.  Do not keep alcohol or drugs in your home.  Plan ahead. Think about what you'll say if other people ask you to " drink or use drugs. Try not to spend time with people who drink or use drugs.  Use the time and money spent on drinking or drugs to do something that's important to you.  Preventing a relapse  Have a plan to deal with relapse. Learn to recognize changes in your thinking that lead you to drink or use drugs. Get help before you start to drink or use drugs again.  Try to stay away from situations, friends, or places that may lead you to drink or use drugs.  If you feel the need to drink alcohol or use drugs again, seek help right away. Call a trusted friend or family member. Some people get support from organizations such as Narcotics Anonymous or [employer] or from treatment facilities.  If you relapse, get help as soon as you can. Some people make a plan with another person that outlines what they want that person to do for them if they relapse. The plan usually includes how to handle the relapse and who to notify in case of relapse.  Don't give up. Remember that a relapse doesn't mean that you have failed. Use the experience to learn the triggers that lead you to drink or use drugs. Then quit again. Recovery is a lifelong process. Many people have several relapses before they are able to quit for good.  Follow-up care is a key part of your treatment and safety. Be sure to make and go to all appointments, and call your doctor if you are having problems. It's also a good idea to know your test results and keep a list of the medicines you take.  When should you call for help?   Call 911  anytime you think you may need emergency care. For example, call if you or someone else:    Has overdosed or has withdrawal signs. Be sure to tell the emergency workers that you are or someone else is using or trying to quit using drugs. Overdose or withdrawal signs may include:  Losing consciousness.  Seizure.  Seeing or hearing things that aren't there (hallucinations).     Is thinking or talking about suicide or harming others.  "  Where to get help 24 hours a day, 7 days a week   If you or someone you know talks about suicide, self-harm, a mental health crisis, a substance use crisis, or any other kind of emotional distress, get help right away. You can:    Call the Suicide and Crisis Lifeline at 988.     Call 1-553-392-TALK (1-122.900.9557).     Text HOME to 037899 to access the Crisis Text Line.   Consider saving these numbers in your phone.  Go to [employer] for more information or to chat online.  Call your doctor now or seek immediate medical care if:    You are having withdrawal symptoms. These may include nausea or vomiting, sweating, shakiness, and anxiety.   Watch closely for changes in your health, and be sure to contact your doctor if:    You have a relapse.     You need more help or support to stop.   Where can you learn more?  Go to https://www.[employer].net/patiented  Enter H573 in the search box to learn more about \"Substance Use Disorder: Care Instructions.\"  Current as of: November 15, 2023               Content Version: 14.0    2537-6049 [employer].   Care instructions adapted under license by your healthcare professional. If you have questions about a medical condition or this instruction, always ask your healthcare professional. [employer] disclaims any warranty or liability for your use of this information.      "

## 2024-04-24 NOTE — TELEPHONE ENCOUNTER
----- Message from Anyi Holder MD sent at 4/24/2024  4:41 PM CDT -----  Regarding: follow up appt  Please schedule her for a med follow up, can be virtual, in about 2 months to review med changes from today.   Anyi Holder MD

## 2024-04-24 NOTE — PROGRESS NOTES
Preventive Care Visit  [facility]  Anyi Holder MD, Family Medicine  Apr 24, 2024      Assessment & Plan       ICD-10-CM    1. Routine general medical examination at a health care facility  Z00.00       2. Class 1 obesity due to excess calories without serious comorbidity with body mass index (BMI) of 30.0 to 30.9 in adult  E66.09 Semaglutide-Weight Management (WEGOVY) 1.7 MG/0.75ML pen    Z68.30       3. Anxiety  F41.9 citalopram (CELEXA) 20 MG tablet      4. Migraine without status migrainosus, not intractable, unspecified migraine type  G43.909 SUMAtriptan (IMITREX) 50 MG tablet      5. Anal fissure  K60.2 hydrocortisone (ANUSOL-HC) 25 MG suppository      6. Celiac disease  K90.0 Vitamin B12     Vitamin D Deficiency     Ferritin     CBC with platelets      7. Hypothyroidism, unspecified type  E03.9       8. History of gestational diabetes mellitus (GDM)  Z86.32 Hemoglobin A1c      9. Oral contraceptive pill surveillance  Z30.41 desogestrel-ethinyl estradiol (APRI) 0.15-30 MG-MCG tablet             Patient has been advised of split billing requirements and indicates understanding: Yes  I recommend a yearly physical, monthly self breast exam, mammogram every year, Pap smear every 3 years, not due today.  She will need to establish a new primary care provider when she moves up to [facility].    For her celiac disease, I do recommend that she get back in with Dr. Atkinson here this summer for a scope but let her recover from her anal fissure first.  Because of her celiac disease I am getting labs on her today including B12, vitamin D, ferritin, and a CBC.  We talked about dietary changes and foods that can substitute for the things that she had to cut out of her diet.  Also for the anal fissure and going to give her some hydrocortisone suppositories that she may use occasionally as needed for itching.  If these are not covered by insurance she may just try the  topical cortisone 10 cream.  She is already using the witch hazel pads and may continue those as well.  Tub soaks are good as well.    For her weight loss, we are going to increase her to the next dose of Wegovy, the 1.7 mg weekly dose.  I anticipate she might use this for 1 to 2 months or maybe longer if it is effective at weight loss.  I reminded her that the lower doses are mostly just to get her to tolerate the medication and not necessarily effective for weight loss yet.  However 1.7 may be effective and she may stay at that long-term if it is working.  Otherwise if not, we will go up to the 2.4 mg dose.  We discussed issues with side effects to watch for and also the potential for weight regain after stopping the medication.    For her anxiety, we agreed to go up on her Celexa dose to 20 mg daily.  I would like to follow-up with her in 1 to 2 months to see if this dose is working better.    For her headaches, I am going to give her a trial of Imitrex.  We talked about dose range and appropriate use.  If this dose is effective then I will refill it for her.  If not we may need to try at 100 mg.  We can discuss this at her med follow-up.    I did refill her birth control pills without change.    Because of her history of gestational diabetes I do recommend that she be screened for diabetes type 2 on a yearly basis.  A1c pending today.      25 additional minutes spent by me on the date of the encounter doing chart review, history and exam, documentation and further activities per the note for non-preventive health issues.       Counseling  Appropriate preventive services were discussed with this patient, including applicable screening as appropriate for fall prevention, nutrition, physical activity, Tobacco-use cessation, weight loss and cognition.  Checklist reviewing preventive services available has been given to the patient.  Reviewed patient's diet, addressing concerns and/or questions.   She is at risk for  lack of exercise and has been provided with information to increase physical activity for the benefit of her well-being.   The patient was instructed to see the dentist every 6 months.     Anyi Holder MD     Wayne Mac is a 43 year old, presenting for the following:  Physical        4/24/2024     1:40 PM   Additional Questions   Roomed by Tati BLACKWELL        Health Care Directive  Patient does not have a Health Care Directive or Living Will: Discussed advance care planning with patient; information given to patient to review.    HPI  Here for a physical and several other concerns, see below.         4/24/2024   General Health   How would you rate your overall physical health? (!) FAIR   Feel stress (tense, anxious, or unable to sleep) Rather much   (!) STRESS CONCERN      4/24/2024   Nutrition   Three or more servings of calcium each day? Yes   Diet: Gluten-free/reduced   How many servings of fruit and vegetables per day? (!) 0-1   How many sweetened beverages each day? 0-1         4/24/2024   Exercise   Days per week of moderate/strenous exercise 3 days         4/24/2024   Social Factors   Frequency of gathering with friends or relatives Once a week   Worry food won't last until get money to buy more No   Food not last or not have enough money for food? No   Do you have housing?  Yes   Are you worried about losing your housing? No   Lack of transportation? No   Unable to get utilities (heat,electricity)? No         4/24/2024   Dental   Dentist two times every year? (!) NO         4/24/2024   TB Screening   Were you born outside of the [place]? No         Today's PHQ-2 Score:       4/24/2024     1:43 PM   PHQ-2 ( 1999 [employer])   Q1: Little interest or pleasure in doing things 0   Q2: Feeling down, depressed or hopeless 0   PHQ-2 Score 0   Q1: Little interest or pleasure in doing things Not at all   Q2: Feeling down, depressed or hopeless Not at all   PHQ-2 Score 0           4/24/2024   Substance Use    Alcohol more than 3/day or more than 7/wk No   Do you use any other substances recreationally? (!) CANNABIS PRODUCTS     Social History     Tobacco Use    Smoking status: Former     Current packs/day: 0.00     Types: Cigarettes     Quit date: 7/1/2008     Years since quitting: 15.8    Smokeless tobacco: Never   Vaping Use    Vaping status: Never Used   Substance Use Topics    Alcohol use: Yes     Comment: Rarely    Drug use: Yes     Types: Marijuana             4/24/2024   Breast Cancer Screening   Family history of breast, colon, or ovarian cancer? No / Unknown         3/22/2024   LAST FHS-7 RESULTS   1st degree relative breast or ovarian cancer No   Any relative bilateral breast cancer No   Any male have breast cancer No   Any ONE woman have BOTH breast AND ovarian cancer No   Any woman with breast cancer before 50yrs No   2 or more relatives with breast AND/OR ovarian cancer No   2 or more relatives with breast AND/OR bowel cancer No        Mammogram Screening - Mammogram every 1-2 years updated in Health Maintenance based on mutual decision making        4/24/2024   STI Screening   New sexual partner(s) since last STI/HIV test? No     History of abnormal Pap smear: NO - age 30- 65 PAP every 3 years recommended        Latest Ref Rng & Units 11/2/2022    12:35 PM   PAP / HPV   PAP  Negative for Intraepithelial Lesion or Malignancy (NILM)    HPV 16 DNA Negative Negative    HPV 18 DNA Negative Negative    Other HR HPV Negative Negative      ASCVD Risk   The 10-year ASCVD risk score (Saranya TANNER, et al., 2019) is: 0.4%    Values used to calculate the score:      Age: 43 years      Sex: Female      Is Non- : No      Diabetic: No      Tobacco smoker: No      Systolic Blood Pressure: 118 mmHg      Is BP treated: No      HDL Cholesterol: 63 mg/dL      Total Cholesterol: 171 mg/dL        4/24/2024   Contraception/Family Planning   Questions about contraception or family planning No       "  Reviewed and updated as needed this visit by Provider                    BP Readings from Last 3 Encounters:   04/24/24 118/82   03/29/24 (!) 108/93   03/20/24 114/84    Wt Readings from Last 3 Encounters:   04/24/24 86.2 kg (190 lb)   03/20/24 88.1 kg (194 lb 3.2 oz)   03/07/24 88.9 kg (196 lb)                  Patient Active Problem List   Diagnosis    Hypothyroidism, unspecified type    History of gestational diabetes mellitus (GDM)    Class 1 obesity due to excess calories without serious comorbidity with body mass index (BMI) of 30.0 to 30.9 in adult    Tinnitus, right    Celiac disease    Duodenitis determined by biopsy    Anxiety    Anal fissure    Migraine without status migrainosus, not intractable, unspecified migraine type     Past Surgical History:   Procedure Laterality Date    BIOPSY  ?    I ve had a couple of tumors biopsied over the years. Also had some tissue biopsied during my last endoscopy    COLONOSCOPY N/A 01/09/2024    Procedure: COLONOSCOPY, FLEXIBLE, WITH LESION REMOVAL USING SNARE;  Surgeon: Robi Soto MD;  Location: [facility]    DILATION AND CURETTAGE SUCTION N/A 03/08/2022    Procedure: DILATION AND CURETTAGE, UTERUS, USING SUCTION;  Surgeon: Bairon Thomas MD;  Location: [facility]    ESOPHAGOSCOPY, GASTROSCOPY, DUODENOSCOPY (EGD), COMBINED N/A 01/09/2024    Procedure: ESOPHAGOGASTRODUODENOSCOPY, WITH BIOPSY;  Surgeon: Robi Soto MD;  Location: [facility]    EXAM UNDER ANESTHESIA, INJECT BOTOX N/A 3/29/2024    Procedure: EXAM UNDER ANESTHESIA, WITH BOTULINUM TOXIN INJECTION;  Surgeon: Robi Soto MD;  Location: [facility]    EYE SURGERY      \"benign tumor above right eye\"    MOUTH SURGERY      \"benign tumor under molar removed\"       Social History     Tobacco Use    Smoking status: Former     Current packs/day: 0.00     Types: Cigarettes     Quit date: 7/1/2008     Years since quitting: 15.8    Smokeless tobacco: Never   Substance Use Topics    Alcohol use: Yes     Comment: " Rarely     Family History   Problem Relation Age of Onset    Diabetes Mother     Thyroid Disease Mother     Other - See Comments Mother         B12 deficiancy    Hyperlipidemia Mother     Asthma Mother     No Known Problems Father     No Known Problems Sister     No Known Problems Brother     Skin Cancer Maternal Grandmother     Cancer Maternal Grandmother         skin    Thyroid Disease Maternal Grandmother     Other Cancer Maternal Grandmother         Skin    Emphysema Maternal Grandfather     Hypertension Paternal Grandmother     Thyroid Disease Paternal Grandmother     Emphysema Paternal Grandfather     No Known Problems Daughter     Cancer Maternal Aunt         stomach    Cancer Maternal Uncle         stomach    Lymphoma Paternal Aunt         intravascular    ALS Paternal Uncle          Current Outpatient Medications   Medication Sig Dispense Refill    citalopram (CELEXA) 20 MG tablet Take 1 tablet (20 mg) by mouth daily 90 tablet 1    COMPOUNDED NON-CONTROLLED SUBSTANCE (CMPD RX) - PHARMACY TO MIX COMPOUNDED MEDICATION Please Compound 0.3% Nifedipine in 1.5% Lidocaine ointment  Apply 1 g topically to affected rectal area BID 60 g 3    desogestrel-ethinyl estradiol (APRI) 0.15-30 MG-MCG tablet Take 1 tablet by mouth daily Active pills continuously, skip placebos 112 tablet 3    Glucosamine-Chondroitin (GLUCOSAMINE CHONDR COMPLEX PO) Take 2 capsules by mouth daily      hydrocortisone (ANUSOL-HC) 25 MG suppository Place 1 suppository (25 mg) rectally 2 times daily as needed for hemorrhoids 24 suppository 1    levothyroxine (SYNTHROID/LEVOTHROID) 100 MCG tablet Take 1 tablet (100 mcg) by mouth daily 90 tablet 3    Multiple Vitamins-Minerals (MULTI FOR HER) CAPS Take 1 capsule by mouth daily      Semaglutide-Weight Management (WEGOVY) 1.7 MG/0.75ML pen Inject 1.7 mg Subcutaneous once a week 3 mL 1    SUMAtriptan (IMITREX) 50 MG tablet Take 1 tablet (50 mg) by mouth at onset of headache for migraine May repeat in 2  hours. Max 4 tablets/24 hours. 9 tablet 2    tiZANidine (ZANAFLEX) 2 MG tablet Take 1 tablet (2 mg) by mouth 3 times daily as needed for muscle spasms (for back pain) 30 tablet 0       She has been using Wegovy for weight loss and has not noticed much benefit yet.  She is just on the 1 mg dose, wondering if it is time to go up to the next dose.  She was recently diagnosed with celiac disease.  She has been working hard on diet change and it has really been throwing her off from a GI standpoint.  Initially when she cut back on gluten products for 2 weeks she did not notice much change, but when she when re-instituted gluten in her diet it was really hard on her.  Now she is trying to find a new normal.  She has some constipation and sometimes diarrhea and she recently was treated for an anal fissure and is trying to heal that.  Is not painful but is very itchy.  She is using witch hazel pads and they are not that helpful.  She recently had a Botox injection.  She has nifedipine cream which has not helped with the itch.  She recently underwent colonoscopy in January and was told to have a repeat in 6 months because of a large polyp and her celiac findings.  She wants to make sure this anal fissure is healed before she reattempts a colonoscopy.  She also was advised that she is at risk for vitamin deficiencies and wonders if there is any testing she should be doing.    She has a history of gestational diabetes and has not been tested for diabetes lately.  Her last A1c was about 2 years ago.  It was normal.    She feels like she has temperature intolerance.  Her feet are always cold and sometimes she is very cold but when she gets warm she is exceedingly hot.  She cannot find an in between.  She wants to make sure this is not a sign of diabetes or other circulatory problem.    She and her family are also planning to move to the [address] soon.  Her  has a new job there and this has been a little bit  "hard on her but she is also excited for the move.  She is not sure exactly when that will take place but probably sometime in the next few months.  She is looking for a new doctor there and wondering if I have any recommendations.    She has been dealing with a lot more headaches.  She has been taking Excedrin which helps but she cannot take anti-inflammatories.  She is wondering if there is something for migraines that she can try that will be hard on her stomach. She was given a prescription for tizanidine but she found that made her very tired.    She is dealing with a lot of anxiety with all that is going on.  She is on Celexa since March.  Just over 1 month.  She finds it helpful but not quite enough and she is wondering if it is time to increase the dose.  She feels like she is handling things a little bit better, especially with the move going on she is able to focus better and not overreact to things but she still thinks that she needs a higher dose.        Review of Systems  CONSTITUTIONAL: NEGATIVE for fever, chills.  INTEGUMENTARY/SKIN: NEGATIVE for worrisome rashes, moles or lesions  EYES: NEGATIVE for vision changes or irritation  ENT/MOUTH: NEGATIVE for ear, mouth and throat problems  RESP: NEGATIVE for significant cough or SOB  BREAST: NEGATIVE for masses, tenderness or discharge  CV: NEGATIVE for chest pain, palpitations or peripheral edema  GI: see above  : NEGATIVE for frequency, dysuria, or hematuria  MUSCULOSKELETAL: NEGATIVE for significant arthralgias or myalgia  NEURO: NEGATIVE for weakness, dizziness or paresthesias, see above re: headaches  ENDOCRINE: NEGATIVE for skin/hair changes, thyroid level recently normal, taking her med.   HEME: NEGATIVE for bleeding problems  PSYCHIATRIC: see above.      Objective    Exam  /82   Pulse 82   Temp 98  F (36.7  C) (Tympanic)   Ht 1.68 m (5' 6.14\")   Wt 86.2 kg (190 lb)   LMP  (LMP Unknown)   SpO2 100%   BMI 30.54 kg/m     Estimated body " "mass index is 30.54 kg/m  as calculated from the following:    Height as of this encounter: 1.68 m (5' 6.14\").    Weight as of this encounter: 86.2 kg (190 lb).    Physical Exam  GENERAL: alert and no distress  EYES: Eyes grossly normal to inspection, PERRL and conjunctivae and sclerae normal  HENT: ear canals and TM's normal, nose and mouth without ulcers or lesions  NECK: no adenopathy, no asymmetry, masses, or scars, no bruits.   RESP: lungs clear to auscultation - no rales, rhonchi or wheezes  CV: regular rate and rhythm, normal S1 S2, no S3 or S4, no murmur, click or rub, no peripheral edema  Breasts: Visual inspection of the breasts is normal without skin changes.  Palpation reveals no obvious masses or nipple discharge.  No axillary masses.   ABDOMEN: soft, nontender, no hepatosplenomegaly, no masses and bowel sounds normal  MS: no gross musculoskeletal defects noted, no edema  SKIN: no suspicious lesions or rashes  NEURO: Normal strength and tone, mentation intact and speech normal  PSYCH: mentation appears normal, affect normal/bright        Signed Electronically by: Anyi Holder MD    "

## 2024-04-24 NOTE — RESULT ENCOUNTER NOTE
Estefania, here are your labs. So far your iron is excellent, your A1C test for diabetes is normal. Your full blood count is normal, no anemia. B12 and D levels are not done yet.   Also please read your after visit instructions from today for more details.   Anyi Holder MD

## 2024-04-25 LAB
VIT B12 SERPL-MCNC: 396 PG/ML (ref 232–1245)
VIT D+METAB SERPL-MCNC: 39 NG/ML (ref 20–50)

## 2024-04-26 RX ORDER — CHOLECALCIFEROL (VITAMIN D3) 50 MCG
1 TABLET ORAL DAILY
Qty: 90 TABLET | Refills: 3 | Status: SHIPPED | OUTPATIENT
Start: 2024-04-26

## 2024-04-26 RX ORDER — CYANOCOBALAMIN (VITAMIN B-12) 2500 MCG
2500 TABLET, SUBLINGUAL SUBLINGUAL DAILY
Qty: 90 TABLET | Refills: 3 | Status: SHIPPED | OUTPATIENT
Start: 2024-04-26

## 2024-04-26 NOTE — TELEPHONE ENCOUNTER
RN called patient and discussed that hormones change during pregnancy and just cause an imbalance in ph and bacterial balance.    Patient understands and agrees to this plan.  She had questions about this bacterial infection and if it caused the baby to be small.  She is informed this would not have caused a small baby.  She is informed PCP has her down at 6 weeks and if there hormone level is excellent for this timing, she was most likely just off on her dates.    Patient has no further questions.  Closing this encounter.  Nina Muhammad, JSN, RN       [FreeTextEntry1] : Paul is a 59  yr old male, who presents today for follow up  in regards type 2   diabetes.   Per patient , has been diabetic since 10 years. Family history includes father with DM  Currently taking metformin 500 mg bid,  glimepiride 4 mg once daily  ozempic 2 mg weekly , farxiga 10 mg daily and  lantus 22 units daily.  stopped pioglitazone 45 mg daily.  FS in office 349 current severity: uncontrolled  A1C  was 7.9% in 3/22,10.8%  in , 8.7% in . 10.1% in    Home Glucose Monitoring checking 2 to 4 x a day BG Readings -fastins   post prandials: 120s to 170s, a few  200s Diet is better, drinks  more water.  ''This patient with diabetes type 2 performs 4 glucose checks per day utilizing a continuous glucose monitor and fingerstick. The patient is treated with insulin via 1 injection daily. This patient requires frequent adjustments to their insulin treatment on the basis of therapeutic continuous glucose monitoring results by adjusting fixed doses of long acting insulin and sliding scale of fast acting insulin. In addition, the patient has been to our office for an evaluation of their diabetes control within the past 6 months.''   Diabetic History ER Visits:  none Hospitalizations:   none Diet Therapy: now watching his diet Diabetic Education:   no Exercise:   mod. active,  Last eye exam: gets yearly, no changes, has to schedule for this year

## 2024-04-26 NOTE — RESULT ENCOUNTER NOTE
Estefania, here are your other lab results. Both your Vitamin D and your B12 levels are so-so. They're in the normal range but on the lower side. I recommend you take a supplement for vitamin D 2000 units daily and B12 2500 mcg daily. Since the B12 is still normal, I don't recommend you go to shots right now, but you need to keep an eye on these levels yearly to make sure they don't drop to the point that you would need injectable B12 due to not absorbing it from the GI tract due to celiac. The B12 I recommend is the sublingual ones, they are usually absorbed well. I'll order both of these for you.   Anyi Holder MD

## 2024-05-17 DIAGNOSIS — G43.909 MIGRAINE WITHOUT STATUS MIGRAINOSUS, NOT INTRACTABLE, UNSPECIFIED MIGRAINE TYPE: ICD-10-CM

## 2024-05-17 RX ORDER — SUMATRIPTAN 100 MG/1
100 TABLET, FILM COATED ORAL
Qty: 9 TABLET | Refills: 3 | Status: SHIPPED | OUTPATIENT
Start: 2024-05-17

## 2024-05-29 NOTE — TELEPHONE ENCOUNTER
Gestational Diabetes Follow-up    Subjective/Objective:    Estefania Arguello sent in blood glucose log for review. Last date of communication was: 3/19/21.    Gestational diabetes is being managed with diet, activity and medications    Taking diabetes medications:   yes:     Diabetes Medication(s)     Insulin       insulin  UNIT/ML injection    Take 21 units at bedtime          Estimated Date of Delivery: May 13, 2021    BG/Food Log:   3/20   94  96  2hr  91  2.5hr  136 2hr  21 units     3/21  83  97  2hr  115  2hr  99  1hr  21 units     3/22  74  90  2hr  101  2hr  119  2hr  21 units     3/23  76  87  2hr  101  2hr  106  1hr  21units     3/24  81  91  2hr  104  2hr  132  1hr  21 units     3/25  93  73  2hr  87   2hr  100  1hr  21 units      Assessment:  Blood sugars in target at current insulin dose.  No changes recommended.     Ketones: none reported.   Fasting blood glucoses: 100% in target.  After breakfast: 100% in target.  After lunch: 100% in target.  After dinner: 100% in target.    Plan/Response:  No changes in the patient's current treatment plan.  Follow-up in 1 week.    Joanna Benton MS, RD, LD, CDE      Any diabetes medication dose changes were made via the CDE Protocol and Collaborative Practice Agreement with the patient's OB/GYN provider. A copy of this encounter was shared with the provider.       Foot Sprain    A foot sprain is an injury to one of the ligaments in the feet. Ligaments are strong tissues that connect bones to each other. The ligament can be stretched too much. In some cases, it may tear. A tear can be either partial or complete. The severity of the sprain depends on how much of the ligament was damaged or torn.    What are the causes?  This condition is usually caused by suddenly twisting or pivoting your foot.    What increases the risk?  You are more likely to develop this condition if:  You play a sport, such as basketball or football.  You exercise or play a sport without first warming up your muscles.  You start a new workout or sport.  You suddenly increase how long or hard you exercise or play a sport.  You have injured your foot or ankle before.  What are the signs or symptoms?  Symptoms of this condition start soon after an injury and include:  Pain, especially in the arch of your foot.  Bruising.  Swelling.  Being unable to walk or use your foot to support body weight.  How is this diagnosed?  This condition is diagnosed with a medical history and physical exam. You may also have imaging tests, such as:  X-rays to check for broken bones (fractures).  An MRI to see if the ligament is torn.  How is this treated?  Treatment for this condition depends on the severity of the sprain. Mild sprains and major sprains can be treated with:  Rest, ice, pressure (compression), and elevation (RICE). Elevation means raising your injured foot.  Keeping your foot in a fixed position (immobilization) for a period of time. This is done if your ligament is overstretched or partially torn. Your health care provider will apply a bandage, splint, or walking boot to keep your foot from moving until it heals.  Using crutches or a scooter for a few weeks to avoid bearing weight on your foot while it is healing.  Physical therapy exercises to improve movement and strength in your foot.  Major sprains may also be treated with:  Surgery. This is done if your ligament is fully torn and a procedure is needed to reconnect it to the bone.  A cast or splint. This will be needed after surgery. A cast or splint will need to stay on your foot while it heals.  Follow these instructions at home:  If you have a bandage, splint, or boot:    Wear it as told by your health care provider. Remove it only as told by your health care provider.  Loosen it if your toes tingle, become numb, or turn cold and blue.  Keep it clean and dry.  If you have a cast:    Do not put pressure on any part of the cast until it is fully hardened. This may take several hours.  Do not stick anything inside the cast to scratch your skin. Doing that increases your risk for infection.  Check the skin around the cast every day. Tell your health care provider about any concerns.  You may put lotion on dry skin around the edges of the cast. Do not put lotion on the skin underneath the cast.  Keep it clean and dry.  Bathing    Do not take baths, swim, or use a hot tub until your health care provider approves. Ask your health care provider if you may take showers. You may only be allowed to take sponge baths.  If the bandage, splint, boot, or cast is not waterproof:  Do not let it get wet.  Cover it with a watertight covering when you take a bath or shower.  Managing pain, stiffness, and swelling      If directed, put ice on the injured area. To do this:  If you have a removable bandage, splint, or boot, remove it as told by your health care provider.  Put ice in a plastic bag.  Place a towel between your skin and the bag, or between your cast and the bag.  Leave the ice on for 20 minutes, 2–3 times per day.  Remove the ice if your skin turns bright red. This is very important. If you cannot feel pain, heat, or cold, you have a greater risk of damage to the area.  Move your toes often to reduce stiffness and swelling.  Elevate the injured area above the level of your heart while you are sitting or lying down.  Activity    Do not use the injured foot to support your body weight until your health care provider says that you can. Use crutches or a scooter as told by your health care provider.  Ask your health care provider what activities are safe for you. Do exercises as told by your health care provider.  Gradually increase how much and how far you walk until your health care provider says it is safe to return to full activity.  Driving    Ask your health care provider if the medicine prescribed to you requires you to avoid driving or using machinery.  Ask your health care provider when it is safe to drive if you have a bandage, splint, boot, or cast on your foot.  General instructions    Take over-the-counter and prescription medicines only as told by your health care provider.  When you can walk without pain, wear supportive shoes that have stiff soles. Do not wear flip-flops. Do not walk barefoot.  Keep all follow-up visits. This is important.  Contact a health care provider if:  Medicine does not help your pain.  Your bruising or swelling gets worse or does not get better with treatment.  Your splint, boot, or cast is damaged.  Get help right away if:  You develop severe numbness or tingling in your foot.  Your foot turns blue, white, or gray, and it feels cold.  Summary  A foot sprain is an injury to one of the ligaments in the feet. Ligaments are strong tissues that connect bones to each other.  You may need a bandage, splint, boot, or cast to support your foot while it heals. Sometimes, surgery may be needed.  You may need physical therapy exercises to improve movement and strength in your foot.  This information is not intended to replace advice given to you by your health care provider. Make sure you discuss any questions you have with your health care provider.

## 2024-06-21 ENCOUNTER — VIRTUAL VISIT (OUTPATIENT)
Dept: FAMILY MEDICINE | Facility: CLINIC | Age: 44
End: 2024-06-21
Payer: COMMERCIAL

## 2024-06-21 DIAGNOSIS — G43.909 MIGRAINE WITHOUT STATUS MIGRAINOSUS, NOT INTRACTABLE, UNSPECIFIED MIGRAINE TYPE: ICD-10-CM

## 2024-06-21 DIAGNOSIS — F41.9 ANXIETY: ICD-10-CM

## 2024-06-21 DIAGNOSIS — E66.811 CLASS 1 OBESITY DUE TO EXCESS CALORIES WITHOUT SERIOUS COMORBIDITY WITH BODY MASS INDEX (BMI) OF 30.0 TO 30.9 IN ADULT: Primary | ICD-10-CM

## 2024-06-21 DIAGNOSIS — E66.09 CLASS 1 OBESITY DUE TO EXCESS CALORIES WITHOUT SERIOUS COMORBIDITY WITH BODY MASS INDEX (BMI) OF 30.0 TO 30.9 IN ADULT: Primary | ICD-10-CM

## 2024-06-21 DIAGNOSIS — K90.0 CELIAC DISEASE: ICD-10-CM

## 2024-06-21 PROCEDURE — 99442 PR PHYSICIAN TELEPHONE EVALUATION 11-20 MIN: CPT | Mod: 93 | Performed by: FAMILY MEDICINE

## 2024-06-21 RX ORDER — CITALOPRAM HYDROBROMIDE 20 MG/1
20 TABLET ORAL DAILY
Qty: 90 TABLET | Refills: 3 | Status: SHIPPED | OUTPATIENT
Start: 2024-06-21

## 2024-06-21 ASSESSMENT — ANXIETY QUESTIONNAIRES
7. FEELING AFRAID AS IF SOMETHING AWFUL MIGHT HAPPEN: SEVERAL DAYS
GAD7 TOTAL SCORE: 4
IF YOU CHECKED OFF ANY PROBLEMS ON THIS QUESTIONNAIRE, HOW DIFFICULT HAVE THESE PROBLEMS MADE IT FOR YOU TO DO YOUR WORK, TAKE CARE OF THINGS AT HOME, OR GET ALONG WITH OTHER PEOPLE: SOMEWHAT DIFFICULT
1. FEELING NERVOUS, ANXIOUS, OR ON EDGE: SEVERAL DAYS
7. FEELING AFRAID AS IF SOMETHING AWFUL MIGHT HAPPEN: SEVERAL DAYS
8. IF YOU CHECKED OFF ANY PROBLEMS, HOW DIFFICULT HAVE THESE MADE IT FOR YOU TO DO YOUR WORK, TAKE CARE OF THINGS AT HOME, OR GET ALONG WITH OTHER PEOPLE?: SOMEWHAT DIFFICULT
GAD7 TOTAL SCORE: 4
2. NOT BEING ABLE TO STOP OR CONTROL WORRYING: SEVERAL DAYS
4. TROUBLE RELAXING: NOT AT ALL
3. WORRYING TOO MUCH ABOUT DIFFERENT THINGS: SEVERAL DAYS
5. BEING SO RESTLESS THAT IT IS HARD TO SIT STILL: NOT AT ALL
6. BECOMING EASILY ANNOYED OR IRRITABLE: NOT AT ALL

## 2024-06-21 ASSESSMENT — PATIENT HEALTH QUESTIONNAIRE - PHQ9
SUM OF ALL RESPONSES TO PHQ QUESTIONS 1-9: 4
SUM OF ALL RESPONSES TO PHQ QUESTIONS 1-9: 4
10. IF YOU CHECKED OFF ANY PROBLEMS, HOW DIFFICULT HAVE THESE PROBLEMS MADE IT FOR YOU TO DO YOUR WORK, TAKE CARE OF THINGS AT HOME, OR GET ALONG WITH OTHER PEOPLE: NOT DIFFICULT AT ALL

## 2024-06-21 ASSESSMENT — ENCOUNTER SYMPTOMS: NERVOUS/ANXIOUS: 1

## 2024-06-21 NOTE — PATIENT INSTRUCTIONS
Estefania,   I sent in the new prescription for the higher dose of Wegovy, 2.4 mg weekly.  I will have my team contact you to set up a follow-up in 3 months.  Please continue on your other medications without change.  I did refill your Celexa for the rest of the year and you should already have refills on your Imitrex.  Please reach out to set up that appointment with Dr. Atkinson as we discussed.  You can also ask your other screening questions to Criselda Thorne when you see her in August.    Anyi Holder MD

## 2024-06-21 NOTE — PROGRESS NOTES
Estefania is a 43 year old who is being evaluated via a billable telephone visit.      Telephone visit performed in lieu of an in-person visit due to current concerns regarding social distancing and keeping people safe.  Physician and patient agreed this was appropriate for concerns addressed.     What phone number would you like to be contacted at? [phone]  How would you like to obtain your AVS? [place]  Originating Location (pt. Location): Other driving, in [place]  near [place]    Distant Location (provider location):  On-site    Assessment & Plan       ICD-10-CM    1. Class 1 obesity due to excess calories without serious comorbidity with body mass index (BMI) of 30.0 to 30.9 in adult  E66.09 Semaglutide-Weight Management (WEGOVY) 2.4 MG/0.75ML pen    Z68.30       2. Anxiety  F41.9 citalopram (CELEXA) 20 MG tablet      3. Migraine without status migrainosus, not intractable, unspecified migraine type  G43.909       4. Celiac disease  K90.0            I am going to bump her up to the 2.4 mg dose of Wegovy.  Will follow-up again in 3 months.    Will have her continue on the same dose of Celexa and I refilled that for the year.  Will also have her continue on the same dose of Imitrex and she should have refills left of that.  Will contact me sooner if she runs out.  Continue doing other headache preventive measures as she has been doing.    Regarding her celiac disease, I recommend that she go ahead and schedule her colonoscopy as was recommended.  She has an upcoming appointment with Criselda Thorne in August and I encouraged her to ask her questions about screening endoscopies to her because I am not sure that that is done routinely.  I believe she should have that done if she is symptomatic but I do not know that she needs a screening endoscopy.  Would be better answered by GI.      Anyi Holder MD     Subjective   Estefania is a 43 year old, presenting for the following health issues:  Anxiety and Weight  Loss      6/21/2024     1:27 PM   Additional Questions   Roomed by jitendra   Accompanied by self     Anxiety    History of Present Illness       Mental Health Follow-up:  Patient presents to follow-up on Anxiety.    Patient's anxiety since last visit has been:  Good  The patient is not having other symptoms associated with anxiety.  Any significant life events: job concerns, financial concerns and housing concerns  Patient is not feeling anxious or having panic attacks.  Patient has no concerns about alcohol or drug use.    Reason for visit:  Weight med f/u          6/21/2024     1:26 PM   SATINDER-7 SCORE   Total Score 4 (minimal anxiety)   Total Score 4          Medication Followup of wegovy  Taking Medication as prescribed: yes  Side Effects:  possible headaches   Medication Helping Symptoms:  yes    She initially did well, lost about 10 pounds on the Wegovy but she seems to have plateaued.  Over the last month or so she has not lost any additional.  She is wondering if it is time to go up to the next dose.  She is currently at the 1.7 mg dose.  She is not having any side effects.  She still has headaches off-and-on and initially was not sure if it was from the Wegovy but there are no different prior to her injection versus after her injection.  She does find the higher dose of Imitrex helpful.  She has not needed to take a second dose.  She has used about 6 pills from the prescription given in April.    Also in April we increased her dose of Celexa to 20 mg and she finds that it is working well.  She has had a few stressful events going on between now and then so all things considered, she feels like it is working well.  They were planning to move to [place] but now she her  lost his job so another planning to stay.  They took their house off the market so that has been a bit of a stressor for her but she is handling it well.    She also has questions about her celiac disease.  She is taking the supplements as I  "recommended last visit.  She has an upcoming appointment with Criselda Thorne in August but she was advised to have a repeat colonoscopy in July and she has not yet scheduled that.  She was not sure who she needed to do that with.  She was advised to see GI specialist for that  She does have a referral but has not yet been able to make the appointment because they were going to move and she was going to do that in [place].  Now that they are staying she will do it here.  She is wondering if she should be getting screening upper endoscopies as well.    7 pt ROS is otherwise negative except as noted in HPI.        Objective    Vitals - Patient Reported  Weight (Patient Reported): 81.6 kg (180 lb)  Height (Patient Reported): 167.6 cm (5' 6\")  BMI (Based on Pt Reported Ht/Wt): 29.05        Physical Exam   General: Alert and no distress //Respiratory: No audible wheeze, cough, or shortness of breath // Psychiatric:  Appropriate affect, tone, and pace of words            Phone call duration: 14 minutes  Signed Electronically by: Anyi Holder MD    "

## 2024-07-23 ENCOUNTER — TELEPHONE (OUTPATIENT)
Dept: GASTROENTEROLOGY | Facility: CLINIC | Age: 44
End: 2024-07-23
Payer: COMMERCIAL

## 2024-07-23 DIAGNOSIS — Z12.11 SPECIAL SCREENING FOR MALIGNANT NEOPLASMS, COLON: Primary | ICD-10-CM

## 2024-07-23 NOTE — TELEPHONE ENCOUNTER
"Endoscopy Scheduling Screen    Have you had a positive Covid test in the last 14 days?  No    What is your communication preference for Instructions and/or Bowel Prep?   MyChart    What insurance is in the chart?  Other:  [employer]     Ordering/Referring Provider: MARGARITO LMI    (If ordering provider performs procedure, schedule with ordering provider unless otherwise instructed. )    BMI: Estimated body mass index is 30.54 kg/m  as calculated from the following:    Height as of 4/24/24: 1.68 m (5' 6.14\").    Weight as of 4/24/24: 86.2 kg (190 lb).     Sedation Ordered  moderate sedation.   If patient BMI > 50 do not schedule in [facility].    If patient BMI > 45 do not schedule at [facility].    Are you taking methadone or Suboxone?  No    Have you had difficulties, pain, or discomfort during past endoscopy procedures?  No    Are you taking any prescription medications for pain 3 or more times per week?   NO, No RN review required.    Do you have a history of malignant hyperthermia?  No    (Females) Are you currently pregnant?   No     Have you been diagnosed or told you have pulmonary hypertension?   No    Do you have an LVAD?  No    Have you been told you have moderate to severe sleep apnea?  No    Have you been told you have COPD, asthma, or any other lung disease?  No    Do you have any heart conditions?  No     Have you ever had or are you waiting for an organ transplant?  No. Continue scheduling, no site restrictions.    Have you had a stroke or transient ischemic attack (TIA aka \"mini stroke\" in the last 6 months?   No    Have you been diagnosed with or been told you have cirrhosis of the liver?   No    Are you currently on dialysis?   No    Do you need assistance transferring?   No    BMI: Estimated body mass index is 30.54 kg/m  as calculated from the following:    Height as of 4/24/24: 1.68 m (5' 6.14\").    Weight as of 4/24/24: 86.2 kg (190 lb).     Is patients BMI > 40 and scheduling location [facility]?  No    Do " you take an injectable medication for weight loss or diabetes (excluding insulin)?  Yes, hold time can be up to 7 days. Please consult with you prescribing provider to discuss endoscopy recommendations.     Do you take the medication Naltrexone?  No    Do you take blood thinners?  No       Prep   Are you currently on dialysis or do you have chronic kidney disease?  No    Do you have a diagnosis of diabetes?  No    Do you have a diagnosis of cystic fibrosis (CF)?  No    On a regular basis do you go 3 -5 days between bowel movements?  No    BMI > 40?  No    Preferred Pharmacy:    [id number] [place], [place] - [address] [facility]  [address] [facility] [address]  Phone: [phone] Fax: [phone]      Final Scheduling Details     Procedure scheduled  Colonoscopy    Surgeon:  ANGIE     Date of procedure:  9/10/24     Pre-OP / PAC:   No - Not required for this site.    Location  [facility] - Per order.    Sedation   MAC/Deep Sedation  per location      Patient Reminders:   You will receive a call from a Nurse to review instructions and health history.  This assessment must be completed prior to your procedure.  Failure to complete the Nurse assessment may result in the procedure being cancelled.      On the day of your procedure, please designate an adult(s) who can drive you home stay with you for the next 24 hours. The medicines used in the exam will make you sleepy. You will not be able to drive.      You cannot take public transportation, ride share services, or non-medical taxi service without a responsible caregiver.  Medical transport services are allowed with the requirement that a responsible caregiver will receive you at your destination.  We require that drivers and caregivers are confirmed prior to your procedure.

## 2024-08-20 RX ORDER — BISACODYL 5 MG/1
TABLET, DELAYED RELEASE ORAL
Qty: 4 TABLET | Refills: 0 | Status: SHIPPED | OUTPATIENT
Start: 2024-08-20

## 2024-08-20 NOTE — TELEPHONE ENCOUNTER
Extended Golytely Bowel Prep  recommended due to GLP-1 agonist medication noted in chart.  Instructions were sent via [employer]. Bowel prep was sent 8/20/2024 to [id number] - [address].

## 2024-09-09 ENCOUNTER — ANESTHESIA EVENT (OUTPATIENT)
Dept: GASTROENTEROLOGY | Facility: CLINIC | Age: 44
End: 2024-09-09
Payer: COMMERCIAL

## 2024-09-09 ASSESSMENT — LIFESTYLE VARIABLES: TOBACCO_USE: 1

## 2024-09-09 NOTE — H&P
[facility] Anesthesia Pre-op History and Physical    Estefania Arguello MRN# [id number]   Age: 44 year old YOB: 1980      Date of Surgery: 9/10/2024 Location [facility]      Date of Exam 9/10/2024 Facility (In hospital)       Home clinic: [facility]  Primary care provider: Anyi Holder         Chief Complaint and/or Reason for Procedure:   No chief complaint on file.  Colonoscopy. Hx adenoma  Advanced adenoma, sigmoid, Jan 2024       Active problem list:     Patient Active Problem List    Diagnosis Date Noted    Anxiety 04/24/2024     Priority: Medium    Anal fissure 04/24/2024     Priority: Medium    Migraine without status migrainosus, not intractable, unspecified migraine type 04/24/2024     Priority: Medium    Duodenitis determined by biopsy 02/09/2024     Priority: Medium    Tinnitus, right 02/06/2024     Priority: Medium    Celiac disease 02/06/2024     Priority: Medium    History of gestational diabetes mellitus (GDM) 11/20/2022     Priority: Medium    Class 1 obesity due to excess calories without serious comorbidity with body mass index (BMI) of 30.0 to 30.9 in adult 11/20/2022     Priority: Medium    Hypothyroidism, unspecified type 10/20/2020     Priority: Medium            Medications (include herbals and vitamins):   Any Plavix use in the last 7 days? No     No current facility-administered medications for this encounter.     Current Outpatient Medications   Medication Sig Dispense Refill    citalopram (CELEXA) 20 MG tablet Take 1 tablet (20 mg) by mouth daily 90 tablet 3    COMPOUNDED NON-CONTROLLED SUBSTANCE (CMPD RX) - PHARMACY TO MIX COMPOUNDED MEDICATION Please Compound 0.3% Nifedipine in 1.5% Lidocaine ointment  Apply 1 g topically to affected rectal area BID 60 g 3    desogestrel-ethinyl estradiol (APRI) 0.15-30 MG-MCG tablet Take 1 tablet by mouth daily Active pills continuously, skip placebos 112 tablet 3     Glucosamine-Chondroitin (GLUCOSAMINE CHONDR COMPLEX PO) Take 2 capsules by mouth daily      levothyroxine (SYNTHROID/LEVOTHROID) 100 MCG tablet Take 1 tablet (100 mcg) by mouth daily 90 tablet 3    Multiple Vitamins-Minerals (MULTI FOR HER) CAPS Take 1 capsule by mouth daily      Semaglutide-Weight Management (WEGOVY) 2.4 MG/0.75ML pen Inject 2.4 mg Subcutaneous once a week 3 mL 3    SUMAtriptan (IMITREX) 100 MG tablet Take 1 tablet (100 mg) by mouth at onset of headache for migraine May repeat in 2 hours. Max 2 tablets/24 hours. 9 tablet 3    tiZANidine (ZANAFLEX) 2 MG tablet Take 1 tablet (2 mg) by mouth 3 times daily as needed for muscle spasms (for back pain) 30 tablet 0    vitamin B-12 (CYANOCOBALAMIN) 2500 MCG sublingual tablet Take 1 tablet (2,500 mcg) by mouth daily 90 tablet 3    vitamin D3 (CHOLECALCIFEROL) 50 mcg (2000 units) tablet Take 1 tablet (50 mcg) by mouth daily 90 tablet 3    bisacodyl (DULCOLAX) 5 MG EC tablet Two days prior to exam take two (2) tablets at 4pm. One day prior to exam take two (2) tablets at 4pm 4 tablet 0    hydrocortisone (ANUSOL-HC) 25 MG suppository Place 1 suppository (25 mg) rectally 2 times daily as needed for hemorrhoids 24 suppository 1    polyethylene glycol (GOLYTELY) 236 g suspension Two days before procedure at 5PM fill first container with water. Mix and drink an 8 oz glass every 10-15 minutes until HALF of the container is gone. Place the remainder in the refrigerator. One day before procedure at 5PM drink second half of bowel prep. Drink an 8 oz glass every 10-15 minutes until it is gone. Day of procedure 6 hours before arrival time fill the 2nd container with water. Mix and drink an 8 oz glass every 10-15 minutes until HALF of the container is gone. Discard the remaining solution. 8000 mL 0             Allergies:    No Known Allergies  Allergy to Latex? No  Allergy to tape?   No  Intolerances:             Physical Exam:   All vitals have been reviewed  No data  found.  No intake/output data recorded.  Lungs:   No increased work of breathing, good air exchange, clear to auscultation bilaterally, no crackles or wheezing     Cardiovascular:   Normal apical impulse, regular rate and rhythm, normal S1 and S2, no S3 or S4, and no murmur noted             Lab / Radiology Results:            Anesthetic risk and/or ASA classification:       Derek Atkinson MD

## 2024-09-09 NOTE — DISCHARGE INSTRUCTIONS
[facility]    Home Care Following Endoscopy          Activity:  You have just undergone an endoscopic procedure usually performed with conscious sedation.  Do not work or operate machinery (including a car) for at least 12 hours.    I encourage you to walk and attempt to pass this air as soon as possible.    Diet:  Return to the diet you were on before your procedure but eat lightly for the first 12-24 hours.  Drink plenty of water.  Resume any regular medications unless otherwise advised by your physician.  Please begin any new medication prescribed as a result of your procedure as directed by your physician.   If you had any biopsy or polyp removed please refrain from aspirin or aspirin products for 2 days.  If on Coumadin please restart as instructed by your physician.   Pain:  You may take Tylenol as needed for pain.  Expected Recovery:  You can expect some mild abdominal fullness and/or discomfort due to the air used to inflate your intestinal tract. It is also normal to have a mild sore throat after upper endoscopy.    Call Your Physician if You Have:  After Colonoscopy:  Worsening persisting abdominal pain which is worse with activity.  Fevers (>101 degrees F), chills or shakes.  Passage of continued blood with bowel movements.     Any questions or concerns about your recovery, please call [phone] or after hours [phone] ([phone]) Nurse Advice Line.    Follow-up Care:  IF YOU HAD polyps/biopsy tissue sample(s) removed.  The polyps/biopsy tissue sample(s) will be sent to pathology.    You should receive letter in your My Chart with your results within 1-2 weeks. If you do not participate in My Chart a physical letter will come in the mail in 2-3 weeks.  Please call if you have not received a notification of your results.  If asked to return to clinic please make an appointment 1 week after your procedure.  Call [phone].

## 2024-09-10 ENCOUNTER — HOSPITAL ENCOUNTER (OUTPATIENT)
Facility: CLINIC | Age: 44
Discharge: HOME | End: 2024-09-10
Attending: INTERNAL MEDICINE | Admitting: INTERNAL MEDICINE
Payer: COMMERCIAL

## 2024-09-10 ENCOUNTER — ANESTHESIA (OUTPATIENT)
Dept: GASTROENTEROLOGY | Facility: CLINIC | Age: 44
End: 2024-09-10
Payer: COMMERCIAL

## 2024-09-10 VITALS
TEMPERATURE: 98.4 F | HEART RATE: 75 BPM | DIASTOLIC BLOOD PRESSURE: 66 MMHG | RESPIRATION RATE: 17 BRPM | SYSTOLIC BLOOD PRESSURE: 103 MMHG | OXYGEN SATURATION: 99 %

## 2024-09-10 LAB — COLONOSCOPY: NORMAL

## 2024-09-10 PROCEDURE — 45378 DIAGNOSTIC COLONOSCOPY: CPT | Performed by: INTERNAL MEDICINE

## 2024-09-10 PROCEDURE — 250N000009 HC RX 250: Performed by: NURSE ANESTHETIST, CERTIFIED REGISTERED

## 2024-09-10 PROCEDURE — 250N000011 HC RX IP 250 OP 636: Performed by: NURSE ANESTHETIST, CERTIFIED REGISTERED

## 2024-09-10 PROCEDURE — 258N000003 HC RX IP 258 OP 636: Performed by: NURSE ANESTHETIST, CERTIFIED REGISTERED

## 2024-09-10 PROCEDURE — 370N000017 HC ANESTHESIA TECHNICAL FEE, PER MIN: Performed by: INTERNAL MEDICINE

## 2024-09-10 RX ORDER — FENTANYL CITRATE 50 UG/ML
25 INJECTION, SOLUTION INTRAMUSCULAR; INTRAVENOUS
Status: DISCONTINUED | OUTPATIENT
Start: 2024-09-10 | End: 2024-09-10 | Stop reason: HOSPADM

## 2024-09-10 RX ORDER — LIDOCAINE HYDROCHLORIDE 20 MG/ML
INJECTION, SOLUTION INFILTRATION; PERINEURAL PRN
Status: DISCONTINUED | OUTPATIENT
Start: 2024-09-10 | End: 2024-09-10

## 2024-09-10 RX ORDER — ONDANSETRON 2 MG/ML
4 INJECTION INTRAMUSCULAR; INTRAVENOUS EVERY 30 MIN PRN
Status: DISCONTINUED | OUTPATIENT
Start: 2024-09-10 | End: 2024-09-10 | Stop reason: HOSPADM

## 2024-09-10 RX ORDER — NALOXONE HYDROCHLORIDE 0.4 MG/ML
0.1 INJECTION, SOLUTION INTRAMUSCULAR; INTRAVENOUS; SUBCUTANEOUS
Status: DISCONTINUED | OUTPATIENT
Start: 2024-09-10 | End: 2024-09-10 | Stop reason: HOSPADM

## 2024-09-10 RX ORDER — PROPOFOL 10 MG/ML
INJECTION, EMULSION INTRAVENOUS CONTINUOUS PRN
Status: DISCONTINUED | OUTPATIENT
Start: 2024-09-10 | End: 2024-09-10

## 2024-09-10 RX ORDER — SODIUM CHLORIDE, SODIUM LACTATE, POTASSIUM CHLORIDE, CALCIUM CHLORIDE 600; 310; 30; 20 MG/100ML; MG/100ML; MG/100ML; MG/100ML
INJECTION, SOLUTION INTRAVENOUS CONTINUOUS
Status: DISCONTINUED | OUTPATIENT
Start: 2024-09-10 | End: 2024-09-10 | Stop reason: HOSPADM

## 2024-09-10 RX ORDER — DEXAMETHASONE SODIUM PHOSPHATE 10 MG/ML
4 INJECTION, SOLUTION INTRAMUSCULAR; INTRAVENOUS
Status: DISCONTINUED | OUTPATIENT
Start: 2024-09-10 | End: 2024-09-10 | Stop reason: HOSPADM

## 2024-09-10 RX ORDER — PROPOFOL 10 MG/ML
INJECTION, EMULSION INTRAVENOUS PRN
Status: DISCONTINUED | OUTPATIENT
Start: 2024-09-10 | End: 2024-09-10

## 2024-09-10 RX ORDER — ONDANSETRON 4 MG/1
4 TABLET, ORALLY DISINTEGRATING ORAL EVERY 30 MIN PRN
Status: DISCONTINUED | OUTPATIENT
Start: 2024-09-10 | End: 2024-09-10 | Stop reason: HOSPADM

## 2024-09-10 RX ADMIN — PROPOFOL 200 MCG/KG/MIN: 10 INJECTION, EMULSION INTRAVENOUS at 12:00

## 2024-09-10 RX ADMIN — PROPOFOL 100 MG: 10 INJECTION, EMULSION INTRAVENOUS at 12:00

## 2024-09-10 RX ADMIN — SODIUM CHLORIDE, POTASSIUM CHLORIDE, SODIUM LACTATE AND CALCIUM CHLORIDE: 600; 310; 30; 20 INJECTION, SOLUTION INTRAVENOUS at 11:32

## 2024-09-10 RX ADMIN — PROPOFOL 100 MG: 10 INJECTION, EMULSION INTRAVENOUS at 12:03

## 2024-09-10 RX ADMIN — LIDOCAINE HYDROCHLORIDE 50 MG: 20 INJECTION, SOLUTION INFILTRATION; PERINEURAL at 12:00

## 2024-09-10 ASSESSMENT — ACTIVITIES OF DAILY LIVING (ADL)
ADLS_ACUITY_SCORE: 35
ADLS_ACUITY_SCORE: 33

## 2024-09-10 NOTE — ANESTHESIA PREPROCEDURE EVALUATION
"Anesthesia Pre-Procedure Evaluation    Patient: Estefania Arguello   MRN: [id number] : 1980        Procedure : Procedure(s):  Colonoscopy          Past Medical History:   Diagnosis Date    Arthritis     I think im starting to notice arthritis in my hands    Back pain     Hypothyroidism     Seasonal allergies       Past Surgical History:   Procedure Laterality Date    BIOPSY  ?    I ve had a couple of tumors biopsied over the years. Also had some tissue biopsied during my last endoscopy    COLONOSCOPY N/A 2024    Procedure: COLONOSCOPY, FLEXIBLE, WITH LESION REMOVAL USING SNARE;  Surgeon: Robi Soto MD;  Location: [facility]    DILATION AND CURETTAGE SUCTION N/A 2022    Procedure: DILATION AND CURETTAGE, UTERUS, USING SUCTION;  Surgeon: Bairon Thomas MD;  Location: [facility]    ESOPHAGOSCOPY, GASTROSCOPY, DUODENOSCOPY (EGD), COMBINED N/A 2024    Procedure: ESOPHAGOGASTRODUODENOSCOPY, WITH BIOPSY;  Surgeon: Robi Soto MD;  Location: [facility]    EXAM UNDER ANESTHESIA, INJECT BOTOX N/A 3/29/2024    Procedure: EXAM UNDER ANESTHESIA, WITH BOTULINUM TOXIN INJECTION;  Surgeon: Robi Soto MD;  Location: [facility]    EYE SURGERY      \"benign tumor above right eye\"    MOUTH SURGERY      \"benign tumor under molar removed\"      No Known Allergies   Social History     Tobacco Use    Smoking status: Former     Current packs/day: 0.00     Types: Cigarettes     Quit date: 2008     Years since quittin.2    Smokeless tobacco: Never   Substance Use Topics    Alcohol use: Yes     Comment: Rarely      Wt Readings from Last 1 Encounters:   24 86.2 kg (190 lb)        Anesthesia Evaluation   Pt has had prior anesthetic.     No history of anesthetic complications       ROS/MED HX  ENT/Pulmonary:     (+)                tobacco use, Past use,                       Neurologic:  - neg neurologic ROS     Cardiovascular:  - neg cardiovascular ROS   (+)  - -   -  - -                             " "    No previous cardiac testing     METS/Exercise Tolerance:     Hematologic:  - neg hematologic  ROS     Musculoskeletal: Comment: CLBP      GI/Hepatic:  - neg GI/hepatic ROS  (-) GERD   Renal/Genitourinary:  - neg Renal ROS     Endo:     (+)          thyroid problem, hypothyroidism,    Obesity,       Psychiatric/Substance Use:  - neg psychiatric ROS     Infectious Disease:  - neg infectious disease ROS     Malignancy:  - neg malignancy ROS     Other:  - neg other ROS          Physical Exam    Airway  airway exam normal      Mallampati: II   TM distance: > 3 FB   Neck ROM: full   Mouth opening: > 3 cm    Respiratory Devices and Support         Dental       (+) Completely normal teeth      Cardiovascular   cardiovascular exam normal       Rhythm and rate: regular and normal     Pulmonary   pulmonary exam normal        breath sounds clear to auscultation           OUTSIDE LABS:  CBC:   Lab Results   Component Value Date    WBC 5.8 04/24/2024    WBC 5.6 03/04/2022    HGB 14.4 04/24/2024    HGB 14.1 03/08/2022    HCT 41.9 04/24/2024    HCT 42.2 03/04/2022     04/24/2024     03/04/2022     BMP:   Lab Results   Component Value Date     04/02/2021     01/22/2021    POTASSIUM 4.1 04/02/2021    POTASSIUM 3.6 01/22/2021    CHLORIDE 105 04/02/2021    CHLORIDE 105 01/22/2021    CO2 26 04/02/2021    CO2 26 01/22/2021    BUN 13 04/02/2021    BUN 10 01/22/2021    CR 0.54 04/02/2021    CR 0.54 01/22/2021    GLC 93 04/02/2021     (H) 01/22/2021     COAGS: No results found for: \"PTT\", \"INR\", \"FIBR\"  POC:   Lab Results   Component Value Date    BGM 76 05/07/2021    HCG Positive (A) 06/18/2020     HEPATIC:   Lab Results   Component Value Date    ALBUMIN 2.5 (L) 04/02/2021    PROTTOTAL 6.6 (L) 04/02/2021    ALT 29 04/02/2021    AST 16 04/02/2021    ALKPHOS 71 04/02/2021    BILITOTAL 0.2 04/02/2021     OTHER:   Lab Results   Component Value Date    A1C 5.0 04/24/2024    LATHA 8.4 (L) 04/02/2021    TSH 0.91 " 02/06/2024    T4 1.07 02/11/2022    T4 13.0 02/11/2022       Anesthesia Plan    ASA Status:  2    NPO Status:  NPO Appropriate    Anesthesia Type: MAC.     - Reason for MAC: straight local not clinically adequate   Induction: Intravenous, Propofol.   Maintenance: TIVA.        Consents    Anesthesia Plan(s) and associated risks, benefits, and realistic alternatives discussed. Questions answered and patient/representative(s) expressed understanding.     - Discussed:     - Discussed with:  Patient      - Extended Intubation/Ventilatory Support Discussed: No.      - Patient is DNR/DNI Status: No     Use of blood products discussed: No .     Postoperative Care    Pain management: Multi-modal analgesia, IV analgesics.        Comments:    Other Comments: The risks and benefits of anesthesia, and the alternatives where applicable, have been discussed with the patient, and they wish to proceed.             ADARSH York CRNA    I have reviewed the pertinent notes and labs in the chart from the past 30 days and (re)examined the patient.  Any updates or changes from those notes are reflected in this note.

## 2024-09-10 NOTE — ANESTHESIA POSTPROCEDURE EVALUATION
Patient: Estefania Arguello    Procedure: Procedure(s):  Colonoscopy       Anesthesia Type:  MAC    Note:  Disposition: Outpatient   Postop Pain Control: Uneventful            Sign Out: Well controlled pain   PONV: No   Neuro/Psych: Uneventful            Sign Out: Acceptable/Baseline neuro status   Airway/Respiratory: Uneventful            Sign Out: Acceptable/Baseline resp. status   CV/Hemodynamics: Uneventful            Sign Out: Acceptable CV status   Other NRE: NONE   DID A NON-ROUTINE EVENT OCCUR? No    Event details/Postop Comments:  Pt was happy with anesthesia care.  No complications.  I will follow up with the pt if needed.           Last vitals:  Vitals Value Taken Time   /66 09/10/24 1238   Temp 98.4  F (36.9  C) 09/10/24 1230   Pulse 75 09/10/24 1238   Resp 17 09/10/24 1238   SpO2 99 % 09/10/24 1238   Vitals shown include unfiled device data.    Electronically Signed By: ADARSH York CRNA  September 10, 2024  12:39 PM

## 2024-09-10 NOTE — ANESTHESIA CARE TRANSFER NOTE
Patient: Estefania Arguello    Procedure: Procedure(s):  Colonoscopy       Diagnosis: High grade dysplasia in colonic adenoma [D12.6]  Diagnosis Additional Information: No value filed.    Anesthesia Type:   MAC     Note:    Oropharynx: oropharynx clear of all foreign objects and spontaneously breathing  Level of Consciousness: drowsy  Oxygen Supplementation: face mask  Level of Supplemental Oxygen (L/min / FiO2): 6  Independent Airway: airway patency satisfactory and stable  Dentition: dentition unchanged  Vital Signs Stable: post-procedure vital signs reviewed and stable  Report to RN Given: handoff report given  Patient transferred to: Phase II    Handoff Report: Identifed the Patient, Identified the Reponsible Provider, Reviewed the pertinent medical history, Discussed the surgical course, Reviewed Intra-OP anesthesia mangement and issues during anesthesia, Set expectations for post-procedure period and Allowed opportunity for questions and acknowledgement of understanding      Vitals:  Vitals Value Taken Time   /66 09/10/24 1238   Temp 98.4  F (36.9  C) 09/10/24 1230   Pulse 75 09/10/24 1238   Resp 17 09/10/24 1238   SpO2 99 % 09/10/24 1238       Electronically Signed By: ADARSH York CRNA  September 10, 2024  12:39 PM

## 2024-10-09 DIAGNOSIS — E66.811 CLASS 1 OBESITY DUE TO EXCESS CALORIES WITHOUT SERIOUS COMORBIDITY WITH BODY MASS INDEX (BMI) OF 30.0 TO 30.9 IN ADULT: ICD-10-CM

## 2024-10-09 DIAGNOSIS — E66.09 CLASS 1 OBESITY DUE TO EXCESS CALORIES WITHOUT SERIOUS COMORBIDITY WITH BODY MASS INDEX (BMI) OF 30.0 TO 30.9 IN ADULT: ICD-10-CM

## 2024-10-10 RX ORDER — SEMAGLUTIDE 2.4 MG/.75ML
2.4 INJECTION, SOLUTION SUBCUTANEOUS WEEKLY
Qty: 3 ML | Refills: 3 | Status: SHIPPED | OUTPATIENT
Start: 2024-10-10

## 2024-11-05 ENCOUNTER — VIRTUAL VISIT (OUTPATIENT)
Dept: FAMILY MEDICINE | Facility: CLINIC | Age: 44
End: 2024-11-05
Payer: COMMERCIAL

## 2024-11-05 DIAGNOSIS — G43.909 MIGRAINE WITHOUT STATUS MIGRAINOSUS, NOT INTRACTABLE, UNSPECIFIED MIGRAINE TYPE: ICD-10-CM

## 2024-11-05 DIAGNOSIS — F41.9 ANXIETY: ICD-10-CM

## 2024-11-05 DIAGNOSIS — E03.9 HYPOTHYROIDISM, UNSPECIFIED TYPE: ICD-10-CM

## 2024-11-05 DIAGNOSIS — E66.09 CLASS 1 OBESITY DUE TO EXCESS CALORIES WITHOUT SERIOUS COMORBIDITY WITH BODY MASS INDEX (BMI) OF 30.0 TO 30.9 IN ADULT: Primary | ICD-10-CM

## 2024-11-05 DIAGNOSIS — E66.811 CLASS 1 OBESITY DUE TO EXCESS CALORIES WITHOUT SERIOUS COMORBIDITY WITH BODY MASS INDEX (BMI) OF 30.0 TO 30.9 IN ADULT: Primary | ICD-10-CM

## 2024-11-05 PROCEDURE — 99442 PR PHYSICIAN TELEPHONE EVALUATION 11-20 MIN: CPT | Mod: 93 | Performed by: FAMILY MEDICINE

## 2024-11-05 RX ORDER — CITALOPRAM HYDROBROMIDE 40 MG/1
40 TABLET ORAL DAILY
Qty: 90 TABLET | Refills: 1 | Status: SHIPPED | OUTPATIENT
Start: 2024-11-05

## 2024-11-05 NOTE — PROGRESS NOTES
Estefania is a 44 year old who is being evaluated via a billable telephone visit.      Telephone visit performed in lieu of an in-person visit due to current concerns regarding social distancing and keeping people safe.  Physician and patient agreed this was appropriate for concerns addressed.     What phone number would you like to be contacted at? [phone]   How would you like to obtain your AVS? Maximohart  Originating Location (pt. Location): Home    Distant Location (provider location):  On-site    Assessment & Plan       ICD-10-CM    1. Class 1 obesity due to excess calories without serious comorbidity with body mass index (BMI) of 30.0 to 30.9 in adult  E66.811 TSH with free T4 reflex    E66.09 phentermine 15 MG capsule    Z68.30 topiramate (TOPAMAX) 25 MG tablet     DISCONTINUED: Phentermine-Topiramate (QSYMIA) 3.75-23 MG CP24     DISCONTINUED: Phentermine-Topiramate 7.5-46 MG CP24     DISCONTINUED: phentermine 15 MG capsule     DISCONTINUED: topiramate (TOPAMAX) 25 MG tablet      2. Anxiety  F41.9 citalopram (CELEXA) 40 MG tablet     TSH with free T4 reflex      3. Hypothyroidism, unspecified type  E03.9 TSH with free T4 reflex      4. Migraine without status migrainosus, not intractable, unspecified migraine type  G43.909 SUMAtriptan (IMITREX) 100 MG tablet         We talked about a few options: going back on a strict diet plan such as weight watchers, using noom again, staying on wegovy, or using a different weight loss medication such as phentermine or qsymia. We discussed potential for stimulant side effects with phentermine or qsymia.   Would like her to get her thyroid checked again as well, but has been at goal.   I agreed to put more refills on file for her imitrex for prn.     She considered her options and would like to try the qsymia. This was not covered by her insurance, but will order separate Rx for phentermine and topiramate. See orders. Will be establishing care with one of my partners  "(likely Dr. Isaac) after my departure and will need follow up on her weight and medication use at that time.       BMI  Estimated body mass index is 30.54 kg/m  as calculated from the following:    Height as of 4/24/24: 1.68 m (5' 6.14\").    Weight as of 4/24/24: 86.2 kg (190 lb).   Weight management plan: Discussed healthy diet and exercise guidelines      Anyi Holder MD     Wayne Mac is a 44 year old, presenting for the following health issues:  Recheck Medication        11/5/2024     1:13 PM   Additional Questions   Roomed by Tati JARA     History of Present Illness       Reason for visit:  Medication check      She has been using wegovy and it is not helping. She is frustrated. She has started gaining weight in the end of September. Since then went from 171 lbs to 177 lb. Current BMI is 28.6   She is craving sweets, never feels full.   She is exercising regularly, complains of feeling tired.   She did Noom in the past.   She does have hypothyroidism diagnosed in 2019, is on levothyroxine, last TSH was therapeutic.   Component Value Date    TSH 0.91 02/06/2024    TSH 1.04 11/02/2022    TSH 0.74 04/02/2021      She uses imitrex infrequently, has 1 refill left.   Uses tizanidine rarely.     7 pt ROS is otherwise negative except as noted in HPI.        Objective           Vitals:  No vitals were obtained today due to virtual visit.    Physical Exam   General: Alert and no distress //Respiratory: No audible wheeze, cough, or shortness of breath // Psychiatric:  Appropriate affect, tone, and pace of words      Orders Placed This Encounter   Procedures    TSH with free T4 reflex          Phone call duration: 12 minutes  Signed Electronically by: Anyi Holder MD    "

## 2024-11-22 RX ORDER — PHENTERMINE AND TOPIRAMATE 3.75; 23 MG/1; MG/1
CAPSULE, EXTENDED RELEASE ORAL
Qty: 45 CAPSULE | Refills: 0 | Status: SHIPPED | OUTPATIENT
Start: 2024-11-22 | End: 2024-11-23

## 2024-11-23 RX ORDER — PHENTERMINE HYDROCHLORIDE 15 MG/1
15 CAPSULE ORAL EVERY MORNING
Qty: 30 CAPSULE | Refills: 2 | Status: SHIPPED | OUTPATIENT
Start: 2024-11-23

## 2024-11-23 RX ORDER — PHENTERMINE HYDROCHLORIDE 15 MG/1
15 CAPSULE ORAL EVERY MORNING
Qty: 30 CAPSULE | Refills: 2 | Status: SHIPPED | OUTPATIENT
Start: 2024-11-23 | End: 2024-11-23

## 2024-11-23 RX ORDER — TOPIRAMATE 25 MG/1
25 TABLET, FILM COATED ORAL 2 TIMES DAILY
Qty: 60 TABLET | Refills: 2 | Status: SHIPPED | OUTPATIENT
Start: 2024-11-23

## 2024-11-23 RX ORDER — SUMATRIPTAN SUCCINATE 100 MG/1
100 TABLET ORAL
Qty: 9 TABLET | Refills: 3 | Status: SHIPPED | OUTPATIENT
Start: 2024-11-23

## 2024-11-23 RX ORDER — TOPIRAMATE 25 MG/1
25 TABLET, FILM COATED ORAL 2 TIMES DAILY
Qty: 60 TABLET | Refills: 2 | Status: SHIPPED | OUTPATIENT
Start: 2024-11-23 | End: 2024-11-23

## 2025-02-25 DIAGNOSIS — E66.09 CLASS 1 OBESITY DUE TO EXCESS CALORIES WITHOUT SERIOUS COMORBIDITY WITH BODY MASS INDEX (BMI) OF 30.0 TO 30.9 IN ADULT: ICD-10-CM

## 2025-02-25 DIAGNOSIS — E66.811 CLASS 1 OBESITY DUE TO EXCESS CALORIES WITHOUT SERIOUS COMORBIDITY WITH BODY MASS INDEX (BMI) OF 30.0 TO 30.9 IN ADULT: ICD-10-CM

## 2025-02-25 DIAGNOSIS — E03.9 HYPOTHYROIDISM, UNSPECIFIED TYPE: ICD-10-CM

## 2025-02-25 RX ORDER — LEVOTHYROXINE SODIUM 100 UG/1
100 TABLET ORAL DAILY
Qty: 90 TABLET | Refills: 0 | Status: SHIPPED | OUTPATIENT
Start: 2025-02-25

## 2025-02-25 RX ORDER — PHENTERMINE HYDROCHLORIDE 15 MG/1
15 CAPSULE ORAL EVERY MORNING
Qty: 30 CAPSULE | Refills: 0 | Status: SHIPPED | OUTPATIENT
Start: 2025-02-25

## 2025-02-25 RX ORDER — TOPIRAMATE 25 MG/1
25 TABLET, FILM COATED ORAL 2 TIMES DAILY
Qty: 60 TABLET | Refills: 0 | Status: SHIPPED | OUTPATIENT
Start: 2025-02-25

## 2025-02-25 NOTE — TELEPHONE ENCOUNTER
Has appointment with Dr. Escobar scheduled.  I will refill her medication now and have staff schedule an appointment with Dr. Escobar to review this with her.    Reed Weinberg MD

## 2025-02-25 NOTE — TELEPHONE ENCOUNTER
Patient is out of these medications, she is needing a refill.     She also was asking if she could do a VIRTUAL visit to discuss increasing the dosing on the medication prior to her upcoming physical.    Can you add her to your schedule?    Bertha Bird XRO//

## 2025-02-26 NOTE — TELEPHONE ENCOUNTER
I have a slot today at 11:30 that I could do a quick virtual to discuss in order to fill until her appt in April if that works. Either that or a phone visit since I can usual handle double booking quick phone visits and I may need to put another one of those in the same slot.     Ashwini Escobar, DO

## 2025-02-27 NOTE — TELEPHONE ENCOUNTER
Called and spoke with patient. Relayed message below.   Pt scheduled for phone visit.     RAISSA Valdovinos

## 2025-04-25 PROBLEM — K60.2 ANAL FISSURE: Status: RESOLVED | Noted: 2024-04-24 | Resolved: 2025-04-25

## 2025-04-25 PROBLEM — E66.811 CLASS 1 OBESITY DUE TO EXCESS CALORIES WITHOUT SERIOUS COMORBIDITY WITH BODY MASS INDEX (BMI) OF 30.0 TO 30.9 IN ADULT: Status: RESOLVED | Noted: 2022-11-20 | Resolved: 2025-04-25

## 2025-04-25 PROBLEM — E66.09 CLASS 1 OBESITY DUE TO EXCESS CALORIES WITHOUT SERIOUS COMORBIDITY WITH BODY MASS INDEX (BMI) OF 30.0 TO 30.9 IN ADULT: Status: RESOLVED | Noted: 2022-11-20 | Resolved: 2025-04-25

## 2025-04-28 DIAGNOSIS — F41.9 ANXIETY: ICD-10-CM

## 2025-04-28 RX ORDER — CITALOPRAM HYDROBROMIDE 20 MG/1
20 TABLET ORAL DAILY
Qty: 30 TABLET | Refills: 0 | Status: SHIPPED | OUTPATIENT
Start: 2025-04-28

## 2025-05-27 ENCOUNTER — TELEPHONE (OUTPATIENT)
Dept: FAMILY MEDICINE | Facility: CLINIC | Age: 45
End: 2025-05-27
Payer: COMMERCIAL

## 2025-05-27 NOTE — TELEPHONE ENCOUNTER
Prior Authorization Retail Medication Request    Medication/Dose: tirzepatide-Weight Management (ZEPBOUND) 2.5 MG/0.5ML prefilled pen  Diagnosis and ICD code (if different than what is on RX):    Encounter for weight management [Z76.89]  - Primary      Overweight with body mass index (BMI) of 28 to 28.9 in adult [E66.3, Z68.28]        New/renewal/insurance change PA/secondary ins. PA:  Previously Tried and Failed:    Rationale:      Insurance   Primary:     [facility]     Insurance ID:  [id number]     Secondary (if applicable):  Insurance ID:      Pharmacy Information (if different than what is on RX)  Name:    [id number] - [place], [place] - [address] [place] [address]     Phone:  [phone]  Fax:       [phone]    Clinic Information  Preferred routing pool for dept communication: [facility]

## 2025-05-28 ENCOUNTER — MYC REFILL (OUTPATIENT)
Dept: FAMILY MEDICINE | Facility: CLINIC | Age: 45
End: 2025-05-28
Payer: COMMERCIAL

## 2025-05-28 ENCOUNTER — TELEPHONE (OUTPATIENT)
Dept: FAMILY MEDICINE | Facility: CLINIC | Age: 45
End: 2025-05-28
Payer: COMMERCIAL

## 2025-05-28 DIAGNOSIS — F41.9 ANXIETY: ICD-10-CM

## 2025-05-28 RX ORDER — CITALOPRAM HYDROBROMIDE 20 MG/1
20 TABLET ORAL DAILY
Qty: 30 TABLET | Refills: 0 | OUTPATIENT
Start: 2025-05-28

## 2025-05-28 RX ORDER — CITALOPRAM HYDROBROMIDE 20 MG/1
20 TABLET ORAL DAILY
Qty: 30 TABLET | Refills: 0 | Status: SHIPPED | OUTPATIENT
Start: 2025-05-28 | End: 2025-05-28

## 2025-05-28 RX ORDER — CITALOPRAM HYDROBROMIDE 20 MG/1
20 TABLET ORAL DAILY
Qty: 30 TABLET | Refills: 2 | Status: SHIPPED | OUTPATIENT
Start: 2025-05-28

## 2025-05-29 NOTE — TELEPHONE ENCOUNTER
Refill per request. (0 day supply. Per prior note may potentially need follow-up for next fill.    Ashwini Escobar, DO

## 2025-05-29 NOTE — TELEPHONE ENCOUNTER
Prior Authorization Approval    Medication: ZEPBOUND 2.5 MG/0.5ML SC SOAJ  Authorization Effective Date: 4/29/2025  Authorization Expiration Date: 5/29/2026  Approved Dose/Quantity:   Reference #: [id number]   Insurance Company: [employer] - Phone [phone] Fax [phone]  Expected CoPay: $    CoPay Card Available:      Financial Assistance Needed:   Which Pharmacy is filling the prescription: [id number] - [place], [place] - [address] [place] [address]  Pharmacy Notified: YES  Patient Notified: Instructed pharmacy to notify patient when script is ready to pick-up/ship

## 2025-05-31 ENCOUNTER — NURSE TRIAGE (OUTPATIENT)
Dept: NURSING | Facility: CLINIC | Age: 45
End: 2025-05-31
Payer: COMMERCIAL

## 2025-05-31 NOTE — TELEPHONE ENCOUNTER
Nurse Triage SBAR    Is this a 2nd Level Triage? NO    Situation: medication question    Background: Patient calling with questions about how to stop taking TOPAMAX and ADIPEX-P and start taking ZEPBOUND.  She was told in the past that she shouldn't abruptly stop taking the medications and did not get any advice at the pharmacy when she picked up the new medication.  She also hasn't discussed how to go about starting to take the new medication with her PCP, as they were unsure when the medication would be approved and filled by pharmacy.      Assessment: medication instruction    Protocol Recommended Disposition:   No disposition on file.    Recommendation: Advised patient to speak to PCP when office is open about medication administration.  Also referred patient to pharmacist if she would like to speak to someone over the weekend.         Does the patient meet one of the following criteria for ADS visit consideration? 16+ years old, with an [facility] PCP     Jessica Diaz RN on 5/31/2025 at 1:00 PM      Reason for Disposition   [1] Caller has NON-URGENT medicine question about med that PCP prescribed AND [2] triager unable to answer question    Protocols used: Medication Question Call-A-

## 2025-06-23 ENCOUNTER — MYC MEDICAL ADVICE (OUTPATIENT)
Dept: FAMILY MEDICINE | Facility: CLINIC | Age: 45
End: 2025-06-23
Payer: COMMERCIAL

## 2025-06-24 NOTE — TELEPHONE ENCOUNTER
Team: Please call patient and advise:  Should plan a follow-up visit, can be video, to discuss and consider increasing dose. Often need the documentation to support the increase and have it approved.     Ashwini Escobar, DO

## 2025-06-25 ENCOUNTER — VIRTUAL VISIT (OUTPATIENT)
Dept: FAMILY MEDICINE | Facility: CLINIC | Age: 45
End: 2025-06-25
Payer: COMMERCIAL

## 2025-06-25 DIAGNOSIS — E66.3 OVERWEIGHT WITH BODY MASS INDEX (BMI) OF 28 TO 28.9 IN ADULT: ICD-10-CM

## 2025-06-25 DIAGNOSIS — Z76.89 ENCOUNTER FOR WEIGHT MANAGEMENT: Primary | ICD-10-CM

## 2025-06-25 PROCEDURE — 98013 SYNCH AUDIO-ONLY EST LOW 20: CPT | Performed by: STUDENT IN AN ORGANIZED HEALTH CARE EDUCATION/TRAINING PROGRAM

## 2025-07-21 ENCOUNTER — VIRTUAL VISIT (OUTPATIENT)
Dept: FAMILY MEDICINE | Facility: CLINIC | Age: 45
End: 2025-07-21
Payer: COMMERCIAL

## 2025-07-21 DIAGNOSIS — E66.3 OVERWEIGHT WITH BODY MASS INDEX (BMI) OF 28 TO 28.9 IN ADULT: ICD-10-CM

## 2025-07-21 DIAGNOSIS — Z76.89 ENCOUNTER FOR WEIGHT MANAGEMENT: Primary | ICD-10-CM

## 2025-07-21 DIAGNOSIS — G43.909 MIGRAINE WITHOUT STATUS MIGRAINOSUS, NOT INTRACTABLE, UNSPECIFIED MIGRAINE TYPE: ICD-10-CM

## 2025-07-21 PROCEDURE — 1126F AMNT PAIN NOTED NONE PRSNT: CPT | Mod: 93 | Performed by: STUDENT IN AN ORGANIZED HEALTH CARE EDUCATION/TRAINING PROGRAM

## 2025-07-21 PROCEDURE — 98013 SYNCH AUDIO-ONLY EST LOW 20: CPT | Performed by: STUDENT IN AN ORGANIZED HEALTH CARE EDUCATION/TRAINING PROGRAM

## 2025-07-21 RX ORDER — RIZATRIPTAN BENZOATE 5 MG/1
5 TABLET ORAL
Qty: 12 TABLET | Refills: 1 | Status: SHIPPED | OUTPATIENT
Start: 2025-07-21

## 2025-07-21 NOTE — PROGRESS NOTES
Estefania is a 44 year old who is being evaluated via a billable telephone visit.    What phone number would you like to be contacted at? [phone] (   How would you like to obtain your AVS? MyChart  Originating Location (pt. Location): Home    Distant Location (provider location):  On-site  Telephone visit completed due to the patient did not consent to a video visit.    Assessment & Plan     Encounter for weight management  Overweight with body mass index (BMI) of 28 to 28.9 in adult  Plan to continue at 5 mg dose one more month then will increase to 7.5 mg for 2 months to allow ample time to acclimate to dose. Refills already sent. Planning follow-up in 3 months before running out to permit sending of next appropriate refill.   - tirzepatide-Weight Management (ZEPBOUND) 5 MG/0.5ML prefilled pen; Inject 0.5 mLs (5 mg) subcutaneously every 7 days.  - tirzepatide-Weight Management (ZEPBOUND) 7.5 MG/0.5ML prefilled pen; Inject 0.5 mLs (7.5 mg) subcutaneously every 7 days.    Migraine without status migrainosus, not intractable, unspecified migraine type  Migraines hit or miss responsive to Imitrex. Will trial Maxalt and follow-up on return visit as planned for weight management in 3 months. Advised to also attempt to narrow down type of headache as tension headaches would not necessarily be as responsive to the triptan and would require alternative therapy. Return precautions reviewed.   - rizatriptan (MAXALT) 5 MG tablet; Take 1 tablet (5 mg) by mouth at onset of headache for migraine. May repeat in 2 hours. Max 6 tablets/24 hours.  - PRIMARY CARE FOLLOW-UP SCHEDULING; Future    Follow-up   No follow-ups on file.     Follow-up Visit   Expected date:  Oct 21, 2025 (Approximate)      Follow Up Appointment Details:     Follow-up with whom?: Me    Follow-Up for what?: Other (Office Visit)    How?: Tonya Escobar, DO Black   Estefania is a 44 year old, presenting for the following health  issues:  Recheck Medication and Weight Management        7/21/2025     5:33 PM   Additional Questions   Roomed by Jordan     History of Present Illness       Reason for visit:  Med check - weight loss    She eats 2-3 servings of fruits and vegetables daily.She consumes 1 sweetened beverage(s) daily.She exercises with enough effort to increase her heart rate 30 to 60 minutes per day.  She exercises with enough effort to increase her heart rate 3 or less days per week.   She is taking medications regularly.        Side effects on the Zepbound. Wakes up in the morning and feels her stomach has some cramping when she gets out of bed. Goes to bathroom and is loose, not liquid. After having a BM, she feels better. Not always right away, but typically is. This has been improving in regards to the degree of cramping, hasn't been every day. This morning not at all. Last week only here and there.  Has been on this dose for 3 weeks, just took the last dose.     Still has migraines, has been on Imitrex maybe a year. Sometimes it works. Other times not. I it's more a stress headache. Would be interested in trying Maxalt to see if that might help.     Review of Systems  Negative unless otherwise specified per HPI.        Objective    Vitals - Patient Reported  Weight (Patient Reported): 78.9 kg (174 lb)  Pain Score: No Pain (0)      Vitals:  No vitals were obtained today due to virtual visit.    Physical Exam   General: Alert and no distress //Respiratory: No audible wheeze, cough, or shortness of breath // Psychiatric:  Appropriate affect, tone, and pace of words        Phone call duration: 25 minutes  Signed Electronically by: Ashwiin Escobar DO

## (undated) DEVICE — KIT ENDO TURNOVER/PROCEDURE CARRY-ON 101822

## (undated) DEVICE — BRUSH CYTOLOGY SOFT 8"

## (undated) DEVICE — PACK BASIC SET-UP 9101

## (undated) DEVICE — PACK MINOR PROCEDURE CUSTOM

## (undated) DEVICE — PREP POVIDONE-IODINE 7.5% SCRUB 4OZ BOTTLE MDS093945

## (undated) DEVICE — ENDO SNARE EXACTO COLD 9MM LOOP 2.4MMX230CM 00711115

## (undated) DEVICE — SOL WATER IRRIG 1000ML BOTTLE 2F7114

## (undated) DEVICE — SYR EAR BULB 2OZ

## (undated) DEVICE — TUBING SUCTION 6"X3/16" N56A

## (undated) DEVICE — SOL NACL 0.9% IRRIG 1000ML BOTTLE 2F7124

## (undated) DEVICE — SYR 01ML 25GA 5/8" TBC

## (undated) DEVICE — PREP POVIDONE-IODINE 10% SOLUTION 4OZ BOTTLE MDS093944

## (undated) DEVICE — SUCTION MANIFOLD NEPTUNE 2 SYS 1 PORT 702-025-000

## (undated) DEVICE — Device

## (undated) DEVICE — TUBING SUCTION 12"X1/4" N612

## (undated) DEVICE — LUBRICATING JELLY 4.25OZ

## (undated) DEVICE — PREP POVIDONE IODINE SOLUTION 10% 120ML

## (undated) DEVICE — PREP POVIDONE IODINE SCRUB 7.5% 120ML

## (undated) DEVICE — SYR 03ML 18GA BLUNT 1.5" LL 305060

## (undated) DEVICE — SUCTION CANNULA UTERINE 07MM CVD  21853

## (undated) DEVICE — APPLICATOR SILVER NITRATE 6"

## (undated) DEVICE — ESU GROUND PAD UNIVERSAL W/O CORD

## (undated) DEVICE — ENDO FORCEP ENDOJAW BIOPSY 2.8MMX230CM FB-220U

## (undated) DEVICE — PREP SKIN SCRUB TRAY 4461A

## (undated) DEVICE — NDL ECLIPSE 25GA 1.5"

## (undated) DEVICE — SUCTION MANIFOLD NEPTUNE 2 SYS 4 PORT 0702-020-000

## (undated) DEVICE — DRSG TELFA 3X8" 1238

## (undated) DEVICE — SUCTION CANNULA UTERINE 08MM CVD  20317

## (undated) DEVICE — DRAPE GYN/UROLOGY FLUID POUCH TUR 29455

## (undated) DEVICE — SPONGE RAY-TEC 4X4" 7317

## (undated) DEVICE — GLOVE PROTEGRITY 7.5 LATEX

## (undated) DEVICE — BLADE KNIFE SURG 11 371111

## (undated) RX ORDER — ONDANSETRON 2 MG/ML
INJECTION INTRAMUSCULAR; INTRAVENOUS
Status: DISPENSED
Start: 2024-03-29

## (undated) RX ORDER — LIDOCAINE HYDROCHLORIDE 10 MG/ML
INJECTION, SOLUTION INFILTRATION; PERINEURAL
Status: DISPENSED
Start: 2022-03-08

## (undated) RX ORDER — FENTANYL CITRATE 50 UG/ML
INJECTION, SOLUTION INTRAMUSCULAR; INTRAVENOUS
Status: DISPENSED
Start: 2021-05-06

## (undated) RX ORDER — EPINEPHRINE 1 MG/ML
INJECTION, SOLUTION INTRAMUSCULAR; SUBCUTANEOUS
Status: DISPENSED
Start: 2022-03-08

## (undated) RX ORDER — SODIUM CHLORIDE 9 MG/ML
INJECTION, SOLUTION INTRAVENOUS
Status: DISPENSED
Start: 2024-03-29

## (undated) RX ORDER — BUPIVACAINE HYDROCHLORIDE 2.5 MG/ML
INJECTION, SOLUTION EPIDURAL; INFILTRATION; INTRACAUDAL
Status: DISPENSED
Start: 2024-03-29

## (undated) RX ORDER — DEXMEDETOMIDINE HYDROCHLORIDE 100 UG/ML
INJECTION, SOLUTION INTRAVENOUS
Status: DISPENSED
Start: 2024-03-29

## (undated) RX ORDER — LIDOCAINE HYDROCHLORIDE 10 MG/ML
INJECTION, SOLUTION EPIDURAL; INFILTRATION; INTRACAUDAL; PERINEURAL
Status: DISPENSED
Start: 2024-03-29

## (undated) RX ORDER — PROPOFOL 10 MG/ML
INJECTION, EMULSION INTRAVENOUS
Status: DISPENSED
Start: 2022-03-08

## (undated) RX ORDER — KETOROLAC TROMETHAMINE 30 MG/ML
INJECTION, SOLUTION INTRAMUSCULAR; INTRAVENOUS
Status: DISPENSED
Start: 2022-03-08

## (undated) RX ORDER — KETOROLAC TROMETHAMINE 30 MG/ML
INJECTION, SOLUTION INTRAMUSCULAR; INTRAVENOUS
Status: DISPENSED
Start: 2024-03-29

## (undated) RX ORDER — DEXAMETHASONE SODIUM PHOSPHATE 10 MG/ML
INJECTION, SOLUTION INTRAMUSCULAR; INTRAVENOUS
Status: DISPENSED
Start: 2024-03-29

## (undated) RX ORDER — EPINEPHRINE 1 MG/ML
INJECTION, SOLUTION INTRAMUSCULAR; SUBCUTANEOUS
Status: DISPENSED
Start: 2024-03-29

## (undated) RX ORDER — FENTANYL CITRATE 50 UG/ML
INJECTION, SOLUTION INTRAMUSCULAR; INTRAVENOUS
Status: DISPENSED
Start: 2024-03-29

## (undated) RX ORDER — LIDOCAINE HYDROCHLORIDE 10 MG/ML
INJECTION, SOLUTION INFILTRATION; PERINEURAL
Status: DISPENSED
Start: 2024-03-29

## (undated) RX ORDER — FENTANYL CITRATE 50 UG/ML
INJECTION, SOLUTION INTRAMUSCULAR; INTRAVENOUS
Status: DISPENSED
Start: 2022-03-08

## (undated) RX ORDER — PROPOFOL 10 MG/ML
INJECTION, EMULSION INTRAVENOUS
Status: DISPENSED
Start: 2024-03-29

## (undated) RX ORDER — DIMENHYDRINATE 50 MG/ML
INJECTION, SOLUTION INTRAMUSCULAR; INTRAVENOUS
Status: DISPENSED
Start: 2022-03-08

## (undated) RX ORDER — LIDOCAINE HYDROCHLORIDE 20 MG/ML
INJECTION, SOLUTION EPIDURAL; INFILTRATION; INTRACAUDAL; PERINEURAL
Status: DISPENSED
Start: 2022-03-08

## (undated) RX ORDER — ONDANSETRON 2 MG/ML
INJECTION INTRAMUSCULAR; INTRAVENOUS
Status: DISPENSED
Start: 2022-03-08